# Patient Record
Sex: FEMALE | Race: WHITE | NOT HISPANIC OR LATINO
[De-identification: names, ages, dates, MRNs, and addresses within clinical notes are randomized per-mention and may not be internally consistent; named-entity substitution may affect disease eponyms.]

---

## 2018-07-24 ENCOUNTER — APPOINTMENT (OUTPATIENT)
Dept: UROLOGY | Facility: CLINIC | Age: 78
End: 2018-07-24
Payer: MEDICARE

## 2018-07-24 VITALS
SYSTOLIC BLOOD PRESSURE: 118 MMHG | RESPIRATION RATE: 14 BRPM | HEART RATE: 235 BPM | DIASTOLIC BLOOD PRESSURE: 62 MMHG | OXYGEN SATURATION: 95 %

## 2018-07-24 PROCEDURE — 99204 OFFICE O/P NEW MOD 45 MIN: CPT

## 2018-07-26 ENCOUNTER — APPOINTMENT (OUTPATIENT)
Dept: CT IMAGING | Facility: IMAGING CENTER | Age: 78
End: 2018-07-26
Payer: MEDICARE

## 2018-07-26 ENCOUNTER — OUTPATIENT (OUTPATIENT)
Dept: OUTPATIENT SERVICES | Facility: HOSPITAL | Age: 78
LOS: 1 days | End: 2018-07-26
Payer: MEDICARE

## 2018-07-26 DIAGNOSIS — N20.9 URINARY CALCULUS, UNSPECIFIED: ICD-10-CM

## 2018-07-26 PROCEDURE — 74176 CT ABD & PELVIS W/O CONTRAST: CPT | Mod: 26

## 2018-07-26 PROCEDURE — 74176 CT ABD & PELVIS W/O CONTRAST: CPT

## 2018-07-27 ENCOUNTER — TRANSCRIPTION ENCOUNTER (OUTPATIENT)
Age: 78
End: 2018-07-27

## 2018-07-28 ENCOUNTER — INPATIENT (INPATIENT)
Facility: HOSPITAL | Age: 78
LOS: 8 days | Discharge: HOME | DRG: 694 | End: 2018-08-06
Attending: FAMILY MEDICINE | Admitting: FAMILY MEDICINE
Payer: MEDICARE

## 2018-07-28 VITALS
RESPIRATION RATE: 18 BRPM | HEIGHT: 63 IN | TEMPERATURE: 98 F | HEART RATE: 81 BPM | WEIGHT: 229.94 LBS | OXYGEN SATURATION: 96 % | SYSTOLIC BLOOD PRESSURE: 146 MMHG | DIASTOLIC BLOOD PRESSURE: 69 MMHG

## 2018-07-28 DIAGNOSIS — N17.9 ACUTE KIDNEY FAILURE, UNSPECIFIED: ICD-10-CM

## 2018-07-28 DIAGNOSIS — J40 BRONCHITIS, NOT SPECIFIED AS ACUTE OR CHRONIC: ICD-10-CM

## 2018-07-28 DIAGNOSIS — C43.9 MALIGNANT MELANOMA OF SKIN, UNSPECIFIED: ICD-10-CM

## 2018-07-28 DIAGNOSIS — Z29.9 ENCOUNTER FOR PROPHYLACTIC MEASURES, UNSPECIFIED: ICD-10-CM

## 2018-07-28 DIAGNOSIS — R06.02 SHORTNESS OF BREATH: ICD-10-CM

## 2018-07-28 DIAGNOSIS — N20.0 CALCULUS OF KIDNEY: ICD-10-CM

## 2018-07-28 DIAGNOSIS — I10 ESSENTIAL (PRIMARY) HYPERTENSION: ICD-10-CM

## 2018-07-28 LAB
ALBUMIN SERPL ELPH-MCNC: 3.1 G/DL — LOW (ref 3.5–5)
ALP SERPL-CCNC: 67 U/L — SIGNIFICANT CHANGE UP (ref 40–120)
ALT FLD-CCNC: 18 U/L DA — SIGNIFICANT CHANGE UP (ref 10–60)
ANION GAP SERPL CALC-SCNC: 14 MMOL/L — SIGNIFICANT CHANGE UP (ref 5–17)
ANION GAP SERPL CALC-SCNC: 16 MMOL/L — SIGNIFICANT CHANGE UP (ref 5–17)
APPEARANCE UR: ABNORMAL
APTT BLD: 29.8 SEC — SIGNIFICANT CHANGE UP (ref 27.5–37.4)
AST SERPL-CCNC: 14 U/L — SIGNIFICANT CHANGE UP (ref 10–40)
BASOPHILS # BLD AUTO: 0 K/UL — SIGNIFICANT CHANGE UP (ref 0–0.2)
BASOPHILS # BLD AUTO: 0.1 K/UL — SIGNIFICANT CHANGE UP (ref 0–0.2)
BASOPHILS NFR BLD AUTO: 0.3 % — SIGNIFICANT CHANGE UP (ref 0–2)
BASOPHILS NFR BLD AUTO: 0.6 % — SIGNIFICANT CHANGE UP (ref 0–2)
BILIRUB SERPL-MCNC: 0.3 MG/DL — SIGNIFICANT CHANGE UP (ref 0.2–1.2)
BILIRUB UR-MCNC: NEGATIVE — SIGNIFICANT CHANGE UP
BLD GP AB SCN SERPL QL: SIGNIFICANT CHANGE UP
BUN SERPL-MCNC: 101 MG/DL — HIGH (ref 7–18)
BUN SERPL-MCNC: 94 MG/DL — HIGH (ref 7–18)
CALCIUM SERPL-MCNC: 8.7 MG/DL — SIGNIFICANT CHANGE UP (ref 8.4–10.5)
CALCIUM SERPL-MCNC: 8.7 MG/DL — SIGNIFICANT CHANGE UP (ref 8.4–10.5)
CHLORIDE SERPL-SCNC: 105 MMOL/L — SIGNIFICANT CHANGE UP (ref 96–108)
CHLORIDE SERPL-SCNC: 106 MMOL/L — SIGNIFICANT CHANGE UP (ref 96–108)
CO2 SERPL-SCNC: 17 MMOL/L — LOW (ref 22–31)
CO2 SERPL-SCNC: 19 MMOL/L — LOW (ref 22–31)
COLOR SPEC: YELLOW — SIGNIFICANT CHANGE UP
CREAT SERPL-MCNC: 12 MG/DL — HIGH (ref 0.5–1.3)
CREAT SERPL-MCNC: 14.4 MG/DL — HIGH (ref 0.5–1.3)
DIFF PNL FLD: ABNORMAL
EOSINOPHIL # BLD AUTO: 0 K/UL — SIGNIFICANT CHANGE UP (ref 0–0.5)
EOSINOPHIL # BLD AUTO: 0.5 K/UL — SIGNIFICANT CHANGE UP (ref 0–0.5)
EOSINOPHIL NFR BLD AUTO: 0.1 % — SIGNIFICANT CHANGE UP (ref 0–6)
EOSINOPHIL NFR BLD AUTO: 4.8 % — SIGNIFICANT CHANGE UP (ref 0–6)
GLUCOSE SERPL-MCNC: 112 MG/DL — HIGH (ref 70–99)
GLUCOSE SERPL-MCNC: 181 MG/DL — HIGH (ref 70–99)
GLUCOSE UR QL: NEGATIVE — SIGNIFICANT CHANGE UP
HCT VFR BLD CALC: 32.3 % — LOW (ref 34.5–45)
HCT VFR BLD CALC: 32.6 % — LOW (ref 34.5–45)
HGB BLD-MCNC: 10.7 G/DL — LOW (ref 11.5–15.5)
HGB BLD-MCNC: 11 G/DL — LOW (ref 11.5–15.5)
INR BLD: 1.03 RATIO — SIGNIFICANT CHANGE UP (ref 0.88–1.16)
KETONES UR-MCNC: NEGATIVE — SIGNIFICANT CHANGE UP
LEUKOCYTE ESTERASE UR-ACNC: ABNORMAL
LYMPHOCYTES # BLD AUTO: 0.4 K/UL — LOW (ref 1–3.3)
LYMPHOCYTES # BLD AUTO: 1.1 K/UL — SIGNIFICANT CHANGE UP (ref 1–3.3)
LYMPHOCYTES # BLD AUTO: 4.1 % — LOW (ref 13–44)
LYMPHOCYTES # BLD AUTO: 9.5 % — LOW (ref 13–44)
MCHC RBC-ENTMCNC: 28.6 PG — SIGNIFICANT CHANGE UP (ref 27–34)
MCHC RBC-ENTMCNC: 28.9 PG — SIGNIFICANT CHANGE UP (ref 27–34)
MCHC RBC-ENTMCNC: 33 GM/DL — SIGNIFICANT CHANGE UP (ref 32–36)
MCHC RBC-ENTMCNC: 33.8 GM/DL — SIGNIFICANT CHANGE UP (ref 32–36)
MCV RBC AUTO: 85.6 FL — SIGNIFICANT CHANGE UP (ref 80–100)
MCV RBC AUTO: 86.8 FL — SIGNIFICANT CHANGE UP (ref 80–100)
MONOCYTES # BLD AUTO: 0.2 K/UL — SIGNIFICANT CHANGE UP (ref 0–0.9)
MONOCYTES # BLD AUTO: 0.8 K/UL — SIGNIFICANT CHANGE UP (ref 0–0.9)
MONOCYTES NFR BLD AUTO: 1.7 % — LOW (ref 2–14)
MONOCYTES NFR BLD AUTO: 7.1 % — SIGNIFICANT CHANGE UP (ref 2–14)
NEUTROPHILS # BLD AUTO: 8.6 K/UL — HIGH (ref 1.8–7.4)
NEUTROPHILS # BLD AUTO: 9 K/UL — HIGH (ref 1.8–7.4)
NEUTROPHILS NFR BLD AUTO: 78 % — HIGH (ref 43–77)
NEUTROPHILS NFR BLD AUTO: 93.8 % — HIGH (ref 43–77)
NITRITE UR-MCNC: NEGATIVE — SIGNIFICANT CHANGE UP
PH UR: 5 — SIGNIFICANT CHANGE UP (ref 5–8)
PLATELET # BLD AUTO: 181 K/UL — SIGNIFICANT CHANGE UP (ref 150–400)
PLATELET # BLD AUTO: 183 K/UL — SIGNIFICANT CHANGE UP (ref 150–400)
POTASSIUM SERPL-MCNC: 4.7 MMOL/L — SIGNIFICANT CHANGE UP (ref 3.5–5.3)
POTASSIUM SERPL-MCNC: 4.8 MMOL/L — SIGNIFICANT CHANGE UP (ref 3.5–5.3)
POTASSIUM SERPL-SCNC: 4.7 MMOL/L — SIGNIFICANT CHANGE UP (ref 3.5–5.3)
POTASSIUM SERPL-SCNC: 4.8 MMOL/L — SIGNIFICANT CHANGE UP (ref 3.5–5.3)
PROT SERPL-MCNC: 7.3 G/DL — SIGNIFICANT CHANGE UP (ref 6–8.3)
PROT UR-MCNC: 30 MG/DL
PROTHROM AB SERPL-ACNC: 11.2 SEC — SIGNIFICANT CHANGE UP (ref 9.8–12.7)
RBC # BLD: 3.73 M/UL — LOW (ref 3.8–5.2)
RBC # BLD: 3.82 M/UL — SIGNIFICANT CHANGE UP (ref 3.8–5.2)
RBC # FLD: 11.7 % — SIGNIFICANT CHANGE UP (ref 10.3–14.5)
RBC # FLD: 11.8 % — SIGNIFICANT CHANGE UP (ref 10.3–14.5)
SODIUM SERPL-SCNC: 138 MMOL/L — SIGNIFICANT CHANGE UP (ref 135–145)
SODIUM SERPL-SCNC: 139 MMOL/L — SIGNIFICANT CHANGE UP (ref 135–145)
SP GR SPEC: 1.01 — SIGNIFICANT CHANGE UP (ref 1.01–1.02)
UROBILINOGEN FLD QL: NEGATIVE — SIGNIFICANT CHANGE UP
WBC # BLD: 11.1 K/UL — HIGH (ref 3.8–10.5)
WBC # BLD: 9.6 K/UL — SIGNIFICANT CHANGE UP (ref 3.8–10.5)
WBC # FLD AUTO: 11.1 K/UL — HIGH (ref 3.8–10.5)
WBC # FLD AUTO: 9.6 K/UL — SIGNIFICANT CHANGE UP (ref 3.8–10.5)

## 2018-07-28 PROCEDURE — 99285 EMERGENCY DEPT VISIT HI MDM: CPT

## 2018-07-28 PROCEDURE — 71046 X-RAY EXAM CHEST 2 VIEWS: CPT | Mod: 26

## 2018-07-28 PROCEDURE — 52332 CYSTOSCOPY AND TREATMENT: CPT | Mod: 50

## 2018-07-28 PROCEDURE — 93010 ELECTROCARDIOGRAM REPORT: CPT

## 2018-07-28 PROCEDURE — 99222 1ST HOSP IP/OBS MODERATE 55: CPT | Mod: 25

## 2018-07-28 PROCEDURE — 74420 UROGRAPHY RTRGR +-KUB: CPT | Mod: 26

## 2018-07-28 RX ORDER — SODIUM CHLORIDE 9 MG/ML
1000 INJECTION INTRAMUSCULAR; INTRAVENOUS; SUBCUTANEOUS
Qty: 0 | Refills: 0 | Status: DISCONTINUED | OUTPATIENT
Start: 2018-07-28 | End: 2018-08-06

## 2018-07-28 RX ORDER — HEPARIN SODIUM 5000 [USP'U]/ML
5000 INJECTION INTRAVENOUS; SUBCUTANEOUS EVERY 8 HOURS
Qty: 0 | Refills: 0 | Status: DISCONTINUED | OUTPATIENT
Start: 2018-07-28 | End: 2018-07-30

## 2018-07-28 RX ORDER — HYDROMORPHONE HYDROCHLORIDE 2 MG/ML
0.5 INJECTION INTRAMUSCULAR; INTRAVENOUS; SUBCUTANEOUS
Qty: 0 | Refills: 0 | Status: DISCONTINUED | OUTPATIENT
Start: 2018-07-28 | End: 2018-07-28

## 2018-07-28 RX ORDER — SODIUM CHLORIDE 9 MG/ML
1000 INJECTION, SOLUTION INTRAVENOUS
Qty: 0 | Refills: 0 | Status: DISCONTINUED | OUTPATIENT
Start: 2018-07-28 | End: 2018-07-28

## 2018-07-28 RX ORDER — ALBUTEROL 90 UG/1
1 AEROSOL, METERED ORAL EVERY 4 HOURS
Qty: 0 | Refills: 0 | Status: COMPLETED | OUTPATIENT
Start: 2018-07-28 | End: 2019-06-26

## 2018-07-28 RX ORDER — SODIUM CHLORIDE 9 MG/ML
1000 INJECTION INTRAMUSCULAR; INTRAVENOUS; SUBCUTANEOUS ONCE
Qty: 0 | Refills: 0 | Status: COMPLETED | OUTPATIENT
Start: 2018-07-28 | End: 2018-07-28

## 2018-07-28 RX ORDER — SODIUM CHLORIDE 9 MG/ML
3 INJECTION INTRAMUSCULAR; INTRAVENOUS; SUBCUTANEOUS ONCE
Qty: 0 | Refills: 0 | Status: COMPLETED | OUTPATIENT
Start: 2018-07-28 | End: 2018-07-28

## 2018-07-28 RX ORDER — IPRATROPIUM/ALBUTEROL SULFATE 18-103MCG
3 AEROSOL WITH ADAPTER (GRAM) INHALATION
Qty: 0 | Refills: 0 | Status: COMPLETED | OUTPATIENT
Start: 2018-07-28 | End: 2018-07-28

## 2018-07-28 RX ORDER — FENTANYL CITRATE 50 UG/ML
25 INJECTION INTRAVENOUS
Qty: 0 | Refills: 0 | Status: DISCONTINUED | OUTPATIENT
Start: 2018-07-28 | End: 2018-07-28

## 2018-07-28 RX ORDER — OXYCODONE AND ACETAMINOPHEN 5; 325 MG/1; MG/1
1 TABLET ORAL EVERY 6 HOURS
Qty: 0 | Refills: 0 | Status: DISCONTINUED | OUTPATIENT
Start: 2018-07-28 | End: 2018-07-28

## 2018-07-28 RX ORDER — TIOTROPIUM BROMIDE 18 UG/1
1 CAPSULE ORAL; RESPIRATORY (INHALATION) DAILY
Qty: 0 | Refills: 0 | Status: COMPLETED | OUTPATIENT
Start: 2018-07-28 | End: 2019-06-26

## 2018-07-28 RX ORDER — CIPROFLOXACIN LACTATE 400MG/40ML
200 VIAL (ML) INTRAVENOUS EVERY 12 HOURS
Qty: 0 | Refills: 0 | Status: DISCONTINUED | OUTPATIENT
Start: 2018-07-28 | End: 2018-08-03

## 2018-07-28 RX ORDER — IPRATROPIUM/ALBUTEROL SULFATE 18-103MCG
3 AEROSOL WITH ADAPTER (GRAM) INHALATION EVERY 6 HOURS
Qty: 0 | Refills: 0 | Status: DISCONTINUED | OUTPATIENT
Start: 2018-07-28 | End: 2018-08-05

## 2018-07-28 RX ADMIN — Medication 3 MILLILITER(S): at 10:45

## 2018-07-28 RX ADMIN — HEPARIN SODIUM 5000 UNIT(S): 5000 INJECTION INTRAVENOUS; SUBCUTANEOUS at 21:46

## 2018-07-28 RX ADMIN — Medication 3 MILLILITER(S): at 10:40

## 2018-07-28 RX ADMIN — Medication 3 MILLILITER(S): at 15:09

## 2018-07-28 RX ADMIN — Medication 100 MILLIGRAM(S): at 14:31

## 2018-07-28 RX ADMIN — Medication 3 MILLILITER(S): at 21:00

## 2018-07-28 RX ADMIN — Medication 3 MILLILITER(S): at 10:51

## 2018-07-28 RX ADMIN — SODIUM CHLORIDE 1000 MILLILITER(S): 9 INJECTION INTRAMUSCULAR; INTRAVENOUS; SUBCUTANEOUS at 10:51

## 2018-07-28 RX ADMIN — SODIUM CHLORIDE 3 MILLILITER(S): 9 INJECTION INTRAMUSCULAR; INTRAVENOUS; SUBCUTANEOUS at 10:45

## 2018-07-28 NOTE — H&P ADULT - NSHPLABSRESULTS_GEN_ALL_CORE
LABS:      CBC Full  -  ( 28 Jul 2018 10:54 )  WBC Count : 11.1 K/uL  Hemoglobin : 11.0 g/dL  Hematocrit : 32.6 %  Platelet Count - Automated : 183 K/uL  Mean Cell Volume : 85.6 fl  Mean Cell Hemoglobin : 28.9 pg  Mean Cell Hemoglobin Concentration : 33.8 gm/dL  Auto Neutrophil # : 8.6 K/uL  Auto Lymphocyte # : 1.1 K/uL  Auto Monocyte # : 0.8 K/uL  Auto Eosinophil # : 0.5 K/uL  Auto Basophil # : 0.1 K/uL  Auto Neutrophil % : 78.0 %  Auto Lymphocyte % : 9.5 %  Auto Monocyte % : 7.1 %  Auto Eosinophil % : 4.8 %  Auto Basophil % : 0.6 %    07-28    138  |  105  |  101<H>  ----------------------------<  112<H>  4.8   |  17<L>  |  14.40<H>    Ca    8.7      28 Jul 2018 10:54    TPro  7.3  /  Alb  3.1<L>  /  TBili  0.3  /  DBili  x   /  AST  14  /  ALT  18  /  AlkPhos  67  07-28    PT/INR - ( 28 Jul 2018 10:54 )   PT: 11.2 sec;   INR: 1.03 ratio         PTT - ( 28 Jul 2018 10:54 )  PTT:29.8 sec                  RADIOLOGY & ADDITIONAL STUDIES (The following images were personally reviewed):    EKG:    CXR: CBC Full  -  ( 28 Jul 2018 10:54 )  WBC Count : 11.1 K/uL  Hemoglobin : 11.0 g/dL  Hematocrit : 32.6 %  Platelet Count - Automated : 183 K/uL  Mean Cell Volume : 85.6 fl  Mean Cell Hemoglobin : 28.9 pg  Mean Cell Hemoglobin Concentration : 33.8 gm/dL  Auto Neutrophil # : 8.6 K/uL  Auto Lymphocyte # : 1.1 K/uL  Auto Monocyte # : 0.8 K/uL  Auto Eosinophil # : 0.5 K/uL  Auto Basophil # : 0.1 K/uL  Auto Neutrophil % : 78.0 %  Auto Lymphocyte % : 9.5 %  Auto Monocyte % : 7.1 %  Auto Eosinophil % : 4.8 %  Auto Basophil % : 0.6 %    07-28    138  |  105  |  101<H>  ----------------------------<  112<H>  4.8   |  17<L>  |  14.40<H>    Ca    8.7      28 Jul 2018 10:54    TPro  7.3  /  Alb  3.1<L>  /  TBili  0.3  /  DBili  x   /  AST  14  /  ALT  18  /  AlkPhos  67  07-28    PT/INR - ( 28 Jul 2018 10:54 )   PT: 11.2 sec;   INR: 1.03 ratio         PTT - ( 28 Jul 2018 10:54 )  PTT:29.8 sec      RADIOLOGY & ADDITIONAL STUDIES (The following images were personally reviewed):    EKG: NSR with normal QTc, no ST - T wave changes    CXR: mild pulmonary vascular congestion bilaterally      EXAM:  CT RENAL STONE [place]      PROCEDURE DATE:  07/26/2018      INTERPRETATION:  CLINICAL INFORMATION: Bilateral kidney stones.    PROCEDURE:   CT of the Abdomen and Pelvis was performed without intravenous contrast.   Intravenous contrast: None.  Oral contrast: None.  Sagittal and coronal reformats were performed.    FINDINGS:    LOWER CHEST: Within normal limits.    LIVER: Within normal limits.  BILE DUCTS: Normal caliber.  GALLBLADDER: Cholelithiasis.  SPLEEN: Within normal limits.  PANCREAS: Within normal limits.  ADRENALS: Within normal limits.  KIDNEYS/URETERS:     Right kidney/ureter: Mild to moderate right hydronephrosis secondary to   an 8 mm obstructing stone at the right UPJ. An additional 9 mm   non-obstructing right lower pole renal calculus.    Left kidney/ureter: Markedly atrophic left kidney with a 1.4 cm stone in   the left renal pelvis at the UPJ. Additional smaller left intrarenal   calculi. No left hydronephrosis.    BLADDER: A 3.1 x 2.1 cm bladder calculus.  REPRODUCTIVE ORGANS: A 5.5 cm pedunculated calcified melanoma at the left   lower uterine segment. No adnexal mass.    BOWEL: No bowel obstruction. Appendix is normal.  PERITONEUM: No ascites.  VESSELS:  Atherosclerotic changes.  RETROPERITONEUM: No lymphadenopathy.    ABDOMINAL WALL: Within normal limits.  BONES: Degenerative changes of the spine.    IMPRESSION:     Mild to moderate right hydronephrosis secondary to an 8 mm obstructing   stone at the right UPJ. Additional non-obstructing right lower pole renal   calculus.    Markedly atrophic left kidney as above with a 1.4 cm stone in the left   renal pelvis.    Large bladder calculus.

## 2018-07-28 NOTE — H&P ADULT - PROBLEM SELECTOR PLAN 1
requiring urological intervention  f/u BMP, creatinine  no need for antibiotics at this time as patient is afebrile, nontachycardic, vital signs within normal limits requiring urological intervention  f/u BMP, creatinine, urine lytes requiring urological intervention  f/u BMP, creatinine, urine lytes, urine output monitoring

## 2018-07-28 NOTE — ED PROVIDER NOTE - MEDICAL DECISION MAKING DETAILS
79 y/o F pt, concern for obstructing stones causing renal failure. Will do labs and contact Dr. Caldwell.

## 2018-07-28 NOTE — H&P ADULT - PROBLEM SELECTOR PLAN 4
s/p duoneb s/p 1 dose of duonebs  continue to monitor respiratory status s/p 1 dose of duonebs, likely due to fluid overload from obstruction  continue to monitor respiratory status

## 2018-07-28 NOTE — H&P ADULT - MUSCULOSKELETAL
detailed exam normal/ROM intact/no joint swelling/normal strength/no joint warmth/no joint erythema/no calf tenderness

## 2018-07-28 NOTE — H&P ADULT - PROBLEM SELECTOR PLAN 5
5.5 cm pedunculated calcified melanoma at the left lower uterine segment  further workup/investigation post-op

## 2018-07-28 NOTE — H&P ADULT - NSHPPHYSICALEXAM_GEN_ALL_CORE
Vital Signs Last 24 Hrs  T(C): 36.3 (28 Jul 2018 12:13), Max: 36.5 (28 Jul 2018 10:15)  T(F): 97.3 (28 Jul 2018 12:13), Max: 97.7 (28 Jul 2018 10:15)  HR: 83 (28 Jul 2018 12:13) (81 - 83)  BP: 150/73 (28 Jul 2018 12:13) (146/69 - 150/73)  RR: 18 (28 Jul 2018 12:13) (18 - 18)  SpO2: 95% (28 Jul 2018 12:13) (95% - 96%)

## 2018-07-28 NOTE — ED ADULT NURSE NOTE - NS ED NURSE DISCH DISPOSITION
GENERAL SURGERY POST OP NOTE    OPERATION: LAPAROSCOPIC CHOLECYSTECTOMY W/CHOLANGIOGRAMS  POST OP: 2 Day Post-Op     SUBJECTIVE:  Reporting pain in thighs, taking in liquids   OBJECTIVE:   Blood pressure 140/68, pulse 78, temperature 98 °F (36.7 °C), temperature source Oral, resp. rate 20, height 4' 8\" (1.422 m), weight 68.8 kg, SpO2 94 %.  RECENT WEIGHTS:  Weight    05/12/17 1902 05/13/17 0812 05/14/17 0804 05/15/17 0845   Weight: 65.7 kg 65 kg 67.7 kg 68.8 kg     General: Alert, No acute distress  Abdomen: soft, nondistended, surgical incision is clean, dry, and intact  Neuro: grossly normal  Intake/Output:    Intake/Output Summary (Last 24 hours) at 05/16/17 0754  Last data filed at 05/16/17 0634   Gross per 24 hour   Intake             2135 ml   Output              300 ml   Net             1835 ml      Last Stool Occurrence:       Laboratory Results:    Recent Labs  Lab 05/16/17  0355 05/15/17  0351 05/14/17  0615   WBC 7.9 7.4 8.2   RBC 4.27 3.93* 4.11   HCT 39.6 36.4 38.3   HGB 13.2 12.2 13.0    123* 118*       Recent Labs  Lab 05/15/17  0351 05/14/17  0354 05/13/17 2058 05/13/17  0345   SODIUM 142 144  --  146*   POTASSIUM 4.2 4.5  --  3.8   CHLORIDE 112* 113*  --  109*   CO2 21 22  --  22   GLUCOSE 225* 113*  --  228*   BUN 19 16  --  20   CREATININE 0.72 0.65  --  0.75   CALCIUM 7.6* 7.8*  --  8.1*   ALBUMIN 2.4* 2.2*  --  2.8*   MG 2.1  --  1.9  --    BILIRUBIN 2.8* 3.9*  --  3.8*   ALKPT 162* 137*  --  167*   * 191*  --  387*   * 184*  --  237*   BCRAT 26* 25  --  27*       Surgical Care Improvement Project:  Bhargavi: No  VTE Pharmacologic Prophylaxis: No pharmacologic Venous Thromboembolism prophylaxis due to Active Bleeding / Risk of Bleeding  VTE Mechanical Prophylaxis: Yes  Antibiotics D/C'd within 24 hours of surgery end: No,  Antibiotic ordered because patient has an infection or suspected infection  Central Line: No  Ulcer prophylaxis:Yes     DIAGNOSIS:  -- Gallstone  pancreatitis post Lap chris with (-) IOC 5/14 (Dr. Faulkner)  -- ERCP 5/13: Mild amount of sludge extracted from the bile duct with balloon sweeps. Likely had stones that may have passed.  Dilated common bile duct to about 8 mm. Opacification of the gallbladder showing cholelithiasis with a filling defect in the cystic duct. Post sphincterotomy bleed treated with injection of epinephrine and balloon tamponade  -- Advanced Dementia   -- DM    PLAN:  Pain management with scheduled Tylenol and Tramadol, 25 mg q 6hrs  Diet: Clear liquids advance to Transition  Begin stool softener   IV Zosyn   Labs: normal WBC. LFTs were improving, no draw today, CMP tomorrow am  Geriatrics to assist with medical management   Begin DVT prophylaxis   Discharge planning: SNF for rehab since she is well below her functional baseline    Ellyn Figueroa PA-C, [phone]       Admitted

## 2018-07-28 NOTE — CONSULT NOTE ADULT - SUBJECTIVE AND OBJECTIVE BOX
77 y/o woman with PMH of HTN, Kidney  stones had CT abd/pel done and was found to have b/l ureteral stones with obstructive uropathy;  pt was asked to come to ER for management.   Pt denies flank pain, N/V, fever/chills; Pt having decreased urine output with some dysuria.  ER work-up reveals acute renal failure with Cr 14.40    PAST MEDICAL & SURGICAL HISTORY:  Kidney stones  HTN (hypertension)  No significant past surgical history    PE: morbidly obese, some respiratory distress    Vital Signs Last 24 Hrs  T(C): 36.3 (28 Jul 2018 12:13), Max: 36.5 (28 Jul 2018 10:15)  T(F): 97.3 (28 Jul 2018 12:13), Max: 97.7 (28 Jul 2018 10:15)  HR: 83 (28 Jul 2018 12:13) (81 - 83)  BP: 150/73 (28 Jul 2018 12:13) (146/69 - 150/73)  BP(mean): --  RR: 18 (28 Jul 2018 12:13) (18 - 18)  SpO2: 95% (28 Jul 2018 12:13) (95% - 96%)    Abd: protuberant, soft, ND, no flank tenderness, suprapubic distended but non-tender                            11.0   11.1  )-----------( 183      ( 28 Jul 2018 10:54 )             32.6       07-28    138  |  105  |  101<H>  ----------------------------<  112<H>  4.8   |  17<L>  |  14.40<H>    Ca    8.7      28 Jul 2018 10:54    TPro  7.3  /  Alb  3.1<L>  /  TBili  0.3  /  DBili  x   /  AST  14  /  ALT  18  /  AlkPhos  67  07-28 77 y/o woman with PMH of HTN, Kidney  stones had CT abd/pel done and was found to have b/l ureteral stones with obstructive uropathy;  pt was asked to come to ER for management.   Pt denies flank pain, N/V, fever/chills; Pt having decreased urine output with some dysuria.  ER work-up reveals acute renal failure with Cr 14.40    PAST MEDICAL & SURGICAL HISTORY:  Kidney stones  HTN (hypertension)  No significant past surgical history    PE: morbidly obese, some respiratory distress    Vital Signs Last 24 Hrs  T(C): 36.3 (28 Jul 2018 12:13), Max: 36.5 (28 Jul 2018 10:15)  T(F): 97.3 (28 Jul 2018 12:13), Max: 97.7 (28 Jul 2018 10:15)  HR: 83 (28 Jul 2018 12:13) (81 - 83)  BP: 150/73 (28 Jul 2018 12:13) (146/69 - 150/73)  BP(mean): --  RR: 18 (28 Jul 2018 12:13) (18 - 18)  SpO2: 95% (28 Jul 2018 12:13) (95% - 96%)    Chest: B/l BS, some crackles at base  CVS: S1S2, RRR  Abd: protuberant, soft, ND, no flank tenderness, suprapubic distended but non-tender  Ext: +edema                            11.0   11.1  )-----------( 183      ( 28 Jul 2018 10:54 )             32.6       07-28    138  |  105  |  101<H>  ----------------------------<  112<H>  4.8   |  17<L>  |  14.40<H>    Ca    8.7      28 Jul 2018 10:54    TPro  7.3  /  Alb  3.1<L>  /  TBili  0.3  /  DBili  x   /  AST  14  /  ALT  18  /  AlkPhos  67  07-28

## 2018-07-28 NOTE — H&P ADULT - PROBLEM SELECTOR PLAN 3
patient on losartan and metoprolol at home, will hold for procedure patient on losartan and metoprolol at home, will hold for procedure  continue to monitor BP

## 2018-07-28 NOTE — CONSULT NOTE ADULT - ASSESSMENT
77 y/o woman with acute renal failure secondary to obstructive uropathy from b/l ureteral stones      -NPO, IVF  -IV abx  -Pre-op for urgent cystoscopy, b/l ureteral stent placement  -D/W Dr Caldwell 79 y/o woman with acute renal failure secondary to obstructive uropathy from b/l ureteral stones, fluid overload      -NPO, IVF  -IV abx  -Pre-op for urgent cystoscopy, b/l ureteral stent placement  -CXR  -D/W Dr Caldwell

## 2018-07-28 NOTE — H&P ADULT - HISTORY OF PRESENT ILLNESS
79 y/o F pt with a PMHx of HTN with no significant PSHx presents to the ED for further workup of acute renal failure.  Pt reports that she had a CT done last Wednesday for evaluation of kidney stones as outpatient with urologist Dr. Caldwell and received a call back yesterday informing her that she had obstructing stones, and advised to come to the ED for further management. During workup in the ED, patient found to have elevated creatinine of 14.4. Patient does state that she has had right-sided flank pain, cramping in nature, however has subsided for the last few days. Pt is making small amount of urine daily, has not voided today yet; patient states that her son had a similar issue in the past which required stents. Pt denies fever, chills, nausea, vomiting, or diarrhea. Patient states she generally has lower extremity swelling, however noticed that it has gotten worse lately. Patient also notes she was slightly short of breath when coming into the ED, however feels much better after nebulizer treatment.

## 2018-07-28 NOTE — H&P ADULT - PROBLEM SELECTOR PLAN 2
requiring urological intervention requiring urological intervention  Ciprofloxacin for urologic procedure prophylaxis  Patient is at moderate-high risk for procedure

## 2018-07-28 NOTE — CONSULT NOTE ADULT - SUBJECTIVE AND OBJECTIVE BOX
Chief complain  Renal consult was called for PAUL , Creatinine 14.  2 days ago patient was seen by urologyst and underwent CT abdomen that showed bilateral  ureter calculi.  Left atrophic kidney.  Right kidney good renal cortex.      Mild to moderate right hydronephrosis secondary to an 8 mm obstructing   stone at the rightUPJ. Additional nonobstructing right lower pole renal   calculus.    Markedly atrophic left kidney as above with a 1.4 cm stone in the left   renal pelvis.    Large bladder calculus.            PAST MEDICAL & SURGICAL HISTORY:  Kidney stones  HTN (hypertension)  No significant past surgical history      Home Medications Reviewed    Hospital Medications:   MEDICATIONS  (STANDING):  ALBUTerol    90 MICROgram(s) HFA Inhaler 1 Puff(s) Inhalation every 4 hours  ALBUTerol/ipratropium for Nebulization 3 milliLiter(s) Nebulizer every 6 hours  ciprofloxacin   IVPB 200 milliGRAM(s) IV Intermittent every 12 hours  heparin  Injectable 5000 Unit(s) SubCutaneous every 8 hours  tiotropium 18 MICROgram(s) Capsule 1 Capsule(s) Inhalation daily    MEDICATIONS  (PRN):  oxyCODONE    5 mG/acetaminophen 325 mG 1 Tablet(s) Oral every 6 hours PRN Severe Pain (7 - 10)      Allergies    penicillin (Rash)    Intolerances                              11.0   11.1  )-----------( 183      ( 28 Jul 2018 10:54 )             32.6     07-28    138  |  105  |  101<H>  ----------------------------<  112<H>  4.8   |  17<L>  |  14.40<H>    Ca    8.7      28 Jul 2018 10:54    TPro  7.3  /  Alb  3.1<L>  /  TBili  0.3  /  DBili  x   /  AST  14  /  ALT  18  /  AlkPhos  67  07-28    PT/INR - ( 28 Jul 2018 10:54 )   PT: 11.2 sec;   INR: 1.03 ratio         PTT - ( 28 Jul 2018 10:54 )  PTT:29.8 sec          RADIOLOGY & ADDITIONAL STUDIES:    SOCIAL HISTORY: Denies ETOh,Smoking,     FAMILY HISTORY:  Family history of kidney stones (Child)      REVIEW OF SYSTEMS:  CONSTITUTIONAL: No malaise, No fatigue, No fevers or chills, well developed, no diaphoresis  EYES/ENT: No visual changes;  No vertigo or throat pain   NECK: No pain or stiffness  RESPIRATORY: No cough, wheezing, hemoptysis; No shortness of breath  CARDIOVASCULAR: No chest pain or palpitations. No edema  GASTROINTESTINAL: No abdominal or epigastric pain. No nausea, vomiting, or hematemesis; No diarrhea or constipation. No melena or hematochezia.  GENITOURINARY: No dysuria, frequency, foamy urine, urinary urgency, incontinence or hematuria  NEUROLOGICAL: No numbness or weakness, No tremor  SKIN: No itching, burning, rashes, or lesions   VASCULAR: No claudication  Musculoskeletal: no arthralgia, no myalgia  All other review of systems is negative unless indicated above.    VITALS:  Vital Signs Last 24 Hrs  T(C): 36.5 (28 Jul 2018 16:40), Max: 36.6 (28 Jul 2018 14:21)  T(F): 97.7 (28 Jul 2018 16:40), Max: 97.8 (28 Jul 2018 14:21)  HR: 97 (28 Jul 2018 16:40) (81 - 103)  BP: 134/60 (28 Jul 2018 16:40) (116/61 - 150/73)  BP(mean): 78 (28 Jul 2018 15:39) (78 - 92)  RR: 18 (28 Jul 2018 16:40) (18 - 25)  SpO2: 97% (28 Jul 2018 16:40) (91% - 100%)    07-28 @ 07:01  -  07-28 @ 17:33  --------------------------------------------------------  IN: 425 mL / OUT: 550 mL / NET: -125 mL      Height (cm): 160.02 (07-28 @ 10:15)  Weight (kg): 104.3 (07-28 @ 10:15)  BMI (kg/m2): 40.7 (07-28 @ 10:15)  BSA (m2): 2.05 (07-28 @ 10:15)    PHYSICAL EXAM:  Constitutional: NAD  HEENT: anicteric sclera, oropharynx clear, MMM  Neck: No JVD  Respiratory: good air entrance B/L, no wheezes, rales or rhonchi  Cardiovascular: S1, S2, RRR, no pericardial rub, no murmur  Gastrointestinal: BS+, soft, no tenderness, no distension, no bruit  Pelvis: bladder non-distended, no CVA tenderness  Extremities: No cyanosis or clubbing. No peripheral edema  Neurological: A/O x 3, no focal deficits  Psychiatric: Normal mood, normal affect  : No CVA tenderness. No langford.   Skin: No rashes  Vascular: all pulses present  Access: Chief complain  Renal consult was called for PAUL , Creatinine 14.  Patient with a history of Nephrolithiasis, c/o of one week of right flak pain, reduced urine output in the last 2 days.  She was seen by her Urologist and was told to come to the  ER due to bilateral obstructing calculi in ureters.  Patient c/o nausea and reduced appetite in the last 2 days.  C/O leg swelling and SOB for 2 days.  No dysuria, no fever or chills.  She under went Cystoscopy at 1 pm with bilateral stent placement.  After procedure urine output increased.    CT pelvic with contrast was done on 07/26:  Left atrophic kidney.  Right kidney good renal cortex.      Mild to moderate right hydronephrosis secondary to an 8 mm obstructing   stone at the rightUPJ. Additional nonobstructing right lower pole renal   calculus.    Markedly atrophic left kidney as above with a 1.4 cm stone in the left   renal pelvis.    Large bladder calculus.    Patient is not aware of CKD .  History of hypertension            PAST MEDICAL & SURGICAL HISTORY:  Kidney stones  HTN (hypertension)  No significant past surgical history      Home Medications Reviewed    Hospital Medications:   MEDICATIONS  (STANDING):  ALBUTerol    90 MICROgram(s) HFA Inhaler 1 Puff(s) Inhalation every 4 hours  ALBUTerol/ipratropium for Nebulization 3 milliLiter(s) Nebulizer every 6 hours  ciprofloxacin   IVPB 200 milliGRAM(s) IV Intermittent every 12 hours  heparin  Injectable 5000 Unit(s) SubCutaneous every 8 hours  tiotropium 18 MICROgram(s) Capsule 1 Capsule(s) Inhalation daily    MEDICATIONS  (PRN):  oxyCODONE    5 mG/acetaminophen 325 mG 1 Tablet(s) Oral every 6 hours PRN Severe Pain (7 - 10)      Allergies    penicillin (Rash)    Intolerances                              11.0   11.1  )-----------( 183      ( 28 Jul 2018 10:54 )             32.6     07-28    138  |  105  |  101<H>  ----------------------------<  112<H>  4.8   |  17<L>  |  14.40<H>    Ca    8.7      28 Jul 2018 10:54    TPro  7.3  /  Alb  3.1<L>  /  TBili  0.3  /  DBili  x   /  AST  14  /  ALT  18  /  AlkPhos  67  07-28    PT/INR - ( 28 Jul 2018 10:54 )   PT: 11.2 sec;   INR: 1.03 ratio         PTT - ( 28 Jul 2018 10:54 )  PTT:29.8 sec          RADIOLOGY & ADDITIONAL STUDIES:  FINDINGS:  No previous examinations are available for review.    The lungs are free of infiltrate.  No pleural abnormality is seen. The   heart appears normal in size. There is a prominent aortic no abdominal   and with appears to be tortuosity of the descending thoracic aorta. A   superimposed aneurysm or mass in the distal mediastinum is not excluded.   No destructive lesion is identified in the visualized skeleton.      IMPRESSION: No infiltrate. Prominent aortic arch and tortuous descending   thoracic aorta. Aortic aneurysm or distal mediastinal mass is not   excluded. CT scan of the chest is recommended.          SOCIAL HISTORY: Denies ETOh,Smoking,     FAMILY HISTORY:  Family history of kidney stones (Child)son.      REVIEW OF SYSTEMS:  CONSTITUTIONAL: No malaise, No fatigue, No fevers or chills, well developed, no diaphoresis  EYES/ENT: No visual changes;  No vertigo or throat pain   NECK: No pain or stiffness  RESPIRATORY: as above  CARDIOVASCULAR: No chest pain or palpitations.  edema for 2 days  Nausea for 2 days  GENITOURINARY: reduced urine output for 2 days  NEUROLOGICAL: No numbness or weakness, No tremor  SKIN: No itching, burning, rashes, or lesions   VASCULAR: No claudication  Musculoskeletal: no arthralgia, no myalgia      VITALS:  Vital Signs Last 24 Hrs  T(C): 36.5 (28 Jul 2018 16:40), Max: 36.6 (28 Jul 2018 14:21)  T(F): 97.7 (28 Jul 2018 16:40), Max: 97.8 (28 Jul 2018 14:21)  HR: 97 (28 Jul 2018 16:40) (81 - 103)  BP: 134/60 (28 Jul 2018 16:40) (116/61 - 150/73)  BP(mean): 78 (28 Jul 2018 15:39) (78 - 92)  RR: 18 (28 Jul 2018 16:40) (18 - 25)  SpO2: 97% (28 Jul 2018 16:40) (91% - 100%)    07-28 @ 07:01  -  07-28 @ 17:33  --------------------------------------------------------  IN: 425 mL / OUT: 550 mL / NET: -125 mL      Height (cm): 160.02 (07-28 @ 10:15)  Weight (kg): 104.3 (07-28 @ 10:15)  BMI (kg/m2): 40.7 (07-28 @ 10:15)  BSA (m2): 2.05 (07-28 @ 10:15)    PHYSICAL EXAM:  Constitutional: NAD  HEENT: anicteric sclera, oropharynx clear, MMM  Neck: No JVD  Respiratory: good air entrance B/L, no wheezes, rales or rhonchi  Cardiovascular: S1, S2, RRR, no pericardial rub, no murmur  Gastrointestinal: BS+, soft, no tenderness, no distension, no bruit  Pelvis: bladder non-distended, no CVA tenderness, urine in bag not cloudt dark orange color, 500-600 in bag.  Extremities: edema in tibial raes trace right more than left.

## 2018-07-28 NOTE — ED PROVIDER NOTE - OBJECTIVE STATEMENT
79 y/o F pt with a PMHx of HTN and kidney stones and no significant PSHx presents to the ED for evaluation of possible kidney failure. Pt reports that she had a CT done last Wednesday for evaluation of kidney stones and received a call back yesterday informing her that she had obstructing stones and may have kidney failure. Pt is making urine; states that she had a similar issue in the past which required stents. Pt denies fever, chills or any other complaints. Allergy; PCN 77 y/o F pt with a PMHx of HTN on Losartan and Metoprolol and kidney stones and no significant PSHx presents to the ED for evaluation of possible kidney failure. Pt reports that she had a CT done last Wednesday for evaluation of kidney stones and received a call back yesterday informing her that she had obstructing stones and may have kidney failure. Pt is making small amount of urine daily, has not voided today yet; states that her son had a similar issue in the past which required stents. Pt denies fever, chills or any other complaints. Allergy; PCN

## 2018-07-28 NOTE — H&P ADULT - FAMILY HISTORY
Child  Still living? Yes, Estimated age: 41-50  Family history of kidney stones, Age at diagnosis: Age Unknown

## 2018-07-28 NOTE — CONSULT NOTE ADULT - ATTENDING COMMENTS
renal failure melissa obstructing stones  to or for melissa ureteral stents  risk of bleeding infection damage to ureter failure of procedure and need for pcn reviewed

## 2018-07-28 NOTE — CONSULT NOTE ADULT - CONSULT REASON
PAUL  Nephrolithiasis and ureter calculi bialteral PAUL  Nephrolithiasis and ureter calculi bilateral

## 2018-07-28 NOTE — H&P ADULT - ASSESSMENT
Patient admitted for concerns of acute renal failure with post-obstructive component, requiring urological stent placement and further monitoring of urine output and renal functions. Patient admitted for concerns of acute renal failure with post-obstructive component, requiring urological stent placement and further monitoring of urine output and renal functions.    CT finding remarkable for:   1) Mild to moderate right hydronephrosis 2/2  8 mm obstructing stone at the right UPJ. An additional 9 mm non-obstructing right lower pole renal calculus  2) Markedly atrophic left kidney with a 1.4 cm stone in the left renal pelvis at the UPJ. Additional smaller left intrarenal calculi. 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

## 2018-07-28 NOTE — H&P ADULT - ATTENDING COMMENTS
Patient seen and examined. Case fully discussed with  ER attending and medical resident. Reviewed chief complaint. Reviewed review of systems. Reviewed list of medications.  Reviewed physical exam.  Reviewed assessment and plan. agree with full H and P. Will follow up clinically. Patient seen earlier this am and evaluated. Case fully discussed with the medical team, Surgical team and nephrology. Patient needs emergency urological surgery of obstructing stone. Patient at moderate to high risk for surgery, However needs to have above surgery. Family fully aware. Will follow up clinically.

## 2018-07-28 NOTE — H&P ADULT - NSHPSOCIALHISTORY_GEN_ALL_CORE
patient recently , lives in building alone, however son lives downstairs  previous social smoker, quit 20 years ago  occasional alcohol use  denies illicit drug use

## 2018-07-29 LAB
ANION GAP SERPL CALC-SCNC: 10 MMOL/L — SIGNIFICANT CHANGE UP (ref 5–17)
BUN SERPL-MCNC: 81 MG/DL — HIGH (ref 7–18)
CALCIUM SERPL-MCNC: 9 MG/DL — SIGNIFICANT CHANGE UP (ref 8.4–10.5)
CHLORIDE SERPL-SCNC: 109 MMOL/L — HIGH (ref 96–108)
CHOLEST SERPL-MCNC: 136 MG/DL — SIGNIFICANT CHANGE UP (ref 10–199)
CO2 SERPL-SCNC: 21 MMOL/L — LOW (ref 22–31)
CREAT SERPL-MCNC: 8.4 MG/DL — HIGH (ref 0.5–1.3)
CULTURE RESULTS: NO GROWTH — SIGNIFICANT CHANGE UP
GLUCOSE SERPL-MCNC: 156 MG/DL — HIGH (ref 70–99)
HBA1C BLD-MCNC: 6.1 % — HIGH (ref 4–5.6)
HCT VFR BLD CALC: 32.7 % — LOW (ref 34.5–45)
HDLC SERPL-MCNC: 28 MG/DL — LOW (ref 40–125)
HGB BLD-MCNC: 10.7 G/DL — LOW (ref 11.5–15.5)
LIPID PNL WITH DIRECT LDL SERPL: 90 MG/DL — SIGNIFICANT CHANGE UP
MAGNESIUM SERPL-MCNC: 2.3 MG/DL — SIGNIFICANT CHANGE UP (ref 1.6–2.6)
MCHC RBC-ENTMCNC: 28.6 PG — SIGNIFICANT CHANGE UP (ref 27–34)
MCHC RBC-ENTMCNC: 32.6 GM/DL — SIGNIFICANT CHANGE UP (ref 32–36)
MCV RBC AUTO: 87.5 FL — SIGNIFICANT CHANGE UP (ref 80–100)
PHOSPHATE SERPL-MCNC: 6.2 MG/DL — HIGH (ref 2.5–4.5)
PLATELET # BLD AUTO: 178 K/UL — SIGNIFICANT CHANGE UP (ref 150–400)
POTASSIUM SERPL-MCNC: 5.2 MMOL/L — SIGNIFICANT CHANGE UP (ref 3.5–5.3)
POTASSIUM SERPL-SCNC: 5.2 MMOL/L — SIGNIFICANT CHANGE UP (ref 3.5–5.3)
RBC # BLD: 3.74 M/UL — LOW (ref 3.8–5.2)
RBC # FLD: 12 % — SIGNIFICANT CHANGE UP (ref 10.3–14.5)
SODIUM SERPL-SCNC: 140 MMOL/L — SIGNIFICANT CHANGE UP (ref 135–145)
SPECIMEN SOURCE: SIGNIFICANT CHANGE UP
TOTAL CHOLESTEROL/HDL RATIO MEASUREMENT: 4.9 RATIO — SIGNIFICANT CHANGE UP (ref 3.3–7.1)
TRIGL SERPL-MCNC: 91 MG/DL — SIGNIFICANT CHANGE UP (ref 10–149)
TSH SERPL-MCNC: 0.14 UU/ML — LOW (ref 0.34–4.82)
WBC # BLD: 11.1 K/UL — HIGH (ref 3.8–10.5)
WBC # FLD AUTO: 11.1 K/UL — HIGH (ref 3.8–10.5)

## 2018-07-29 PROCEDURE — 99232 SBSQ HOSP IP/OBS MODERATE 35: CPT

## 2018-07-29 RX ADMIN — Medication 3 MILLILITER(S): at 20:18

## 2018-07-29 RX ADMIN — HEPARIN SODIUM 5000 UNIT(S): 5000 INJECTION INTRAVENOUS; SUBCUTANEOUS at 13:19

## 2018-07-29 RX ADMIN — HEPARIN SODIUM 5000 UNIT(S): 5000 INJECTION INTRAVENOUS; SUBCUTANEOUS at 05:40

## 2018-07-29 RX ADMIN — HEPARIN SODIUM 5000 UNIT(S): 5000 INJECTION INTRAVENOUS; SUBCUTANEOUS at 21:38

## 2018-07-29 RX ADMIN — Medication 3 MILLILITER(S): at 15:51

## 2018-07-29 RX ADMIN — Medication 100 MILLIGRAM(S): at 05:40

## 2018-07-29 RX ADMIN — Medication 3 MILLILITER(S): at 09:49

## 2018-07-29 RX ADMIN — Medication 100 MILLIGRAM(S): at 18:25

## 2018-07-29 NOTE — PROGRESS NOTE ADULT - SUBJECTIVE AND OBJECTIVE BOX
Patient seen/examined.  Feels well. No flank pain. No N/V.  Creatinine down to 8.4 today.    Vital Signs Last 24 Hrs  T(C): 36.7 (29 Jul 2018 05:42), Max: 36.7 (29 Jul 2018 00:42)  T(F): 98 (29 Jul 2018 05:42), Max: 98.1 (29 Jul 2018 00:42)  HR: 69 (29 Jul 2018 05:42) (69 - 103)  BP: 125/66 (29 Jul 2018 05:42) (116/61 - 150/73)  BP(mean): 78 (28 Jul 2018 15:39) (78 - 92)  RR: 16 (29 Jul 2018 05:42) (16 - 25)  SpO2: 97% (29 Jul 2018 05:42) (91% - 100%)    General: NAD. AAOx3  Abd: soft. NT/ND  : Weaver with clear yellow urine                          10.7   11.1  )-----------( 178      ( 29 Jul 2018 06:43 )             32.7     07-29    140  |  109<H>  |  81<H>  ----------------------------<  156<H>  5.2   |  21<L>  |  8.40<H>    Ca    9.0      29 Jul 2018 06:43  Phos  6.2     07-29  Mg     2.3     07-29    TPro  7.3  /  Alb  3.1<L>  /  TBili  0.3  /  DBili  x   /  AST  14  /  ALT  18  /  AlkPhos  67  07-28

## 2018-07-29 NOTE — PROGRESS NOTE ADULT - ASSESSMENT
Very pleasant 78 year old woman with bilateral obstructing stones, s/p stents yesterday  -Trend creatinine  -Monitor UOP  -Keep Weaver catheter for now  -Patient will follow up with Dr. Caldwell after discharge

## 2018-07-29 NOTE — PROGRESS NOTE ADULT - SUBJECTIVE AND OBJECTIVE BOX
Problem List:  PAUL - post bilateral ureter stents placement.    Nephrolithiasis  with bilateral ureter stones and obstruction.  Small atrophic left kidney.    Non oliguric urine output 2000 in the last 16 hours.      PAST MEDICAL & SURGICAL HISTORY:  Kidney stones  HTN (hypertension)      penicillin (Rash)      MEDICATIONS  (STANDING):  ALBUTerol    90 MICROgram(s) HFA Inhaler 1 Puff(s) Inhalation every 4 hours  ALBUTerol/ipratropium for Nebulization 3 milliLiter(s) Nebulizer every 6 hours  ciprofloxacin   IVPB 200 milliGRAM(s) IV Intermittent every 12 hours  heparin  Injectable 5000 Unit(s) SubCutaneous every 8 hours  sodium chloride 0.9%. 1000 milliLiter(s) (75 mL/Hr) IV Continuous <Continuous>  tiotropium 18 MICROgram(s) Capsule 1 Capsule(s) Inhalation daily    MEDICATIONS  (PRN):  oxyCODONE    5 mG/acetaminophen 325 mG 1 Tablet(s) Oral every 6 hours PRN Severe Pain (7 - 10)                            10.7   11.1  )-----------( 178      ( 29 Jul 2018 06:43 )             32.7     07-29    140  |  109<H>  |  81<H>  ----------------------------<  156<H>  5.2   |  21<L>  |  8.40<H>    Ca    9.0      29 Jul 2018 06:43  Phos  6.2     07-29  Mg     2.3     07-29    TPro  7.3  /  Alb  3.1<L>  /  TBili  0.3  /  DBili  x   /  AST  14  /  ALT  18  /  AlkPhos  67  07-28    PT/INR - ( 28 Jul 2018 10:54 )   PT: 11.2 sec;   INR: 1.03 ratio         PTT - ( 28 Jul 2018 10:54 )  PTT:29.8 sec        REVIEW OF SYSTEMS:  General: no fever no chills, no weight loss.  RESPIRATORY: No cough, wheezing, hemoptysis; No shortness of breath  CARDIOVASCULAR: No chest pain or palpitations. No Edema  GASTROINTESTINAL: No abdominal or epigastric pain. No nausea, vomiting. No diarrhea or constipation. No melena.  GENITOURINARY: No dysuria, frequency, foamy urine, urinary urgency, incontinence or hematuria  NEUROLOGICAL: No numbness or weakness, no tremor , no dizziness.   Muscle skeletal : no joint pain and no swelling of joints and limbs.  SKIN: No itching, burning, rashes.        VITALS:  T(F): 98 (07-29-18 @ 05:42), Max: 98.1 (07-29-18 @ 00:42)  HR: 69 (07-29-18 @ 05:42)  BP: 125/66 (07-29-18 @ 05:42)  RR: 16 (07-29-18 @ 05:42)  SpO2: 97% (07-29-18 @ 05:42)  Wt(kg): --    07-28 @ 07:01  -  07-29 @ 07:00  --------------------------------------------------------  IN: 425 mL / OUT: 3000 mL / NET: -2575 mL      Height (cm): 160.02 (07-28 @ 10:15)  Weight (kg): 104.3 (07-28 @ 10:15)  BMI (kg/m2): 40.7 (07-28 @ 10:15)  BSA (m2): 2.05 (07-28 @ 10:15)  PHYSICAL EXAM:  Constitutional: well developed, no diaphoresis, no distress.  Neck: No JVD, no carotid bruit, supple, no adenopathy  Respiratory: Good air entrance B/L, no wheezes, rales or rhonchi  Cardiovascular: S1, S2, RRR, no pericardial rub, no murmur  Abdomen: BS+, soft, no tenderness, no bruit  Pelvis: langford bag.  Extremities: trace edema bilateral

## 2018-07-29 NOTE — PROGRESS NOTE ADULT - ASSESSMENT
Non oliguric PAUL, obstructive uroapthy-   Nephrolithiasis.  Atrophy of left kidney.      Follow intake and output.  Follow lab in am, with PO4 and magnesium.  Hyperphosphatemia await repeat PO4 in AM . Hold use of binders for now.  Mild hyperkalemia, follow 2 gn K diet.      Stone workup as per Urology.  Once renal function stable, follow uric acid and urine work up for stone.

## 2018-07-29 NOTE — PROGRESS NOTE ADULT - SUBJECTIVE AND OBJECTIVE BOX
CHIEF COMPLAINT:Patient is a 78y old  Female who presents with a chief complaint of obstructing renal stone (2018 12:28)    	  REVIEW OF SYSTEMS:  CONSTITUTIONAL: No fever, weight loss, or fatigue  EYES: No eye pain, visual disturbances, or discharge  ENMT:  No difficulty hearing, tinnitus, vertigo; No sinus or throat pain  NECK: No pain or stiffness  RESPIRATORY: No cough, wheezing, chills or hemoptysis; No Shortness of Breath  CARDIOVASCULAR: No chest pain, palpitations, passing out, dizziness, or leg swelling  GASTROINTESTINAL: No abdominal or epigastric pain. No nausea, vomiting, or hematemesis; No diarrhea or constipation. No melena or hematochezia.  GENITOURINARY: No dysuria, frequency, hematuria, or incontinence  NEUROLOGICAL: No headaches, memory loss, loss of strength, numbness, or tremors  SKIN: No itching, burning, rashes, or lesions   LYMPH Nodes: No enlarged glands  ENDOCRINE: No heat or cold intolerance; No hair loss  MUSCULOSKELETAL: No joint pain or swelling; No muscle, back, or extremity pain  PSYCHIATRIC: No depression, anxiety, mood swings, or difficulty sleeping  HEME/LYMPH: No easy bruising, or bleeding gums  ALLERY AND IMMUNOLOGIC: No hives or eczema	    [ ] All others negative	  [ ] Unable to obtain    PHYSICAL EXAM:  T(C): 36.5 (18 @ 21:08), Max: 36.7 (18 @ 00:42)  HR: 90 (18 @ 21:08) (69 - 90)  BP: 140/63 (18 @ 21:08) (125/66 - 149/69)  RR: 17 (18 @ 21:08) (16 - 17)  SpO2: 99% (18 @ 21:08) (97% - 99%)  Wt(kg): --  I&O's Summary    2018 07:  -  2018 07:00  --------------------------------------------------------  IN: 425 mL / OUT: 3000 mL / NET: -2575 mL    2018 07:01  -  2018 00:04  --------------------------------------------------------  IN: 0 mL / OUT: 1200 mL / NET: -1200 mL        Appearance: With no complaints. feels well. Was talking on the phone with friend.	  HEENT:   Normal oral mucosa, PERRL, EOMI	  Lymphatic: No lymphadenopathy  Cardiovascular: Normal S1 S2, No JVD, No murmurs, No edema  Respiratory: Lungs clear to auscultation	  Psychiatry: A & O x 3, Mood & affect appropriate  Gastrointestinal:  Soft, Non-tender, + BS	  Skin: No rashes, No ecchymoses, No cyanosis	  Neurologic: Non-focal  Extremities: Normal range of motion, No clubbing, cyanosis or edema  Vascular: Peripheral pulses palpable 2+ bilaterally    MEDICATIONS  (STANDING):  ALBUTerol    90 MICROgram(s) HFA Inhaler 1 Puff(s) Inhalation every 4 hours  ALBUTerol/ipratropium for Nebulization 3 milliLiter(s) Nebulizer every 6 hours  ciprofloxacin   IVPB 200 milliGRAM(s) IV Intermittent every 12 hours  heparin  Injectable 5000 Unit(s) SubCutaneous every 8 hours  sodium chloride 0.9%. 1000 milliLiter(s) (75 mL/Hr) IV Continuous <Continuous>  tiotropium 18 MICROgram(s) Capsule 1 Capsule(s) Inhalation daily      TELEMETRY: 	    ECG:  	  RADIOLOGY:  OTHER: 	  	  CBC Full  -  ( 2018 06:43 )  WBC Count : 11.1 K/uL  Hemoglobin : 10.7 g/dL  Hematocrit : 32.7 %  Platelet Count - Automated : 178 K/uL  Mean Cell Volume : 87.5 fl  Mean Cell Hemoglobin : 28.6 pg  Mean Cell Hemoglobin Concentration : 32.6 gm/dL  Auto Neutrophil # : x  Auto Lymphocyte # : x  Auto Monocyte # : x  Auto Eosinophil # : x  Auto Basophil # : x  Auto Neutrophil % : x  Auto Lymphocyte % : x  Auto Monocyte % : x  Auto Eosinophil % : x  Auto Basophil % : x        CARDIAC MARKERS:                              10.7   11.1  )-----------( 178      ( 2018 06:43 )             32.7       07-29    140  |  109<H>  |  81<H>  ----------------------------<  156<H>  5.2   |  21<L>  |  8.40<H>    Ca    9.0      2018 06:43  Phos  6.2       Mg     2.3         TPro  7.3  /  Alb  3.1<L>  /  TBili  0.3  /  DBili  x   /  AST  14  /  ALT  18  /  AlkPhos  67        PT/INR - ( 2018 10:54 )   PT: 11.2 sec;   INR: 1.03 ratio         PTT - ( 2018 10:54 )  PTT:29.8 sec    Urinalysis Basic - ( 2018 21:19 )    Color: Yellow / Appearance: Slightly Turbid / S.015 / pH: x  Gluc: x / Ketone: Negative  / Bili: Negative / Urobili: Negative   Blood: x / Protein: 30 mg/dL / Nitrite: Negative   Leuk Esterase: Small / RBC: >50 /HPF / WBC 3-5 /HPF   Sq Epi: x / Non Sq Epi: Few /HPF / Bacteria: Trace /HPF      proBNP:   Lipid Profile: Cholesterol 136  LDL 90  HDL 28  TG 91    HgA1c: Hemoglobin A1C, Whole Blood: 6.1 % ( @ 11:47)    TSH: Thyroid Stimulating Hormone, Serum: 0.14 uU/mL ( @ 06:43)

## 2018-07-29 NOTE — PROGRESS NOTE ADULT - PROBLEM SELECTOR PLAN 2
requiring urological intervention. Done yesterday. doing well. with no complaints.  Ciprofloxacin for urologic procedure prophylaxis

## 2018-07-29 NOTE — PROGRESS NOTE ADULT - ASSESSMENT
Patient admitted for concerns of acute renal failure with post-obstructive component, requiring urological stent placement and further monitoring of urine output and renal functions.    CT finding remarkable for:   1) Mild to moderate right hydronephrosis 2/2  8 mm obstructing stone at the right UPJ. An additional 9 mm non-obstructing right lower pole renal calculus  2) Markedly atrophic left kidney with a 1.4 cm stone in the left renal pelvis at the UPJ. Additional smaller left intrarenal calculi. 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

## 2018-07-29 NOTE — PROGRESS NOTE ADULT - PROBLEM SELECTOR PLAN 4
s/p 1 dose of duonebs, likely due to fluid overload from obstruction  continue to monitor respiratory status

## 2018-07-30 LAB
ANION GAP SERPL CALC-SCNC: 8 MMOL/L — SIGNIFICANT CHANGE UP (ref 5–17)
BUN SERPL-MCNC: 59 MG/DL — HIGH (ref 7–18)
CALCIUM SERPL-MCNC: 9.2 MG/DL — SIGNIFICANT CHANGE UP (ref 8.4–10.5)
CHLORIDE SERPL-SCNC: 109 MMOL/L — HIGH (ref 96–108)
CO2 SERPL-SCNC: 24 MMOL/L — SIGNIFICANT CHANGE UP (ref 22–31)
CREAT SERPL-MCNC: 3.97 MG/DL — HIGH (ref 0.5–1.3)
CULTURE RESULTS: SIGNIFICANT CHANGE UP
GLUCOSE BLDC GLUCOMTR-MCNC: 110 MG/DL — HIGH (ref 70–99)
GLUCOSE BLDC GLUCOMTR-MCNC: 143 MG/DL — HIGH (ref 70–99)
GLUCOSE BLDC GLUCOMTR-MCNC: 177 MG/DL — HIGH (ref 70–99)
GLUCOSE SERPL-MCNC: 102 MG/DL — HIGH (ref 70–99)
HCT VFR BLD CALC: 32.7 % — LOW (ref 34.5–45)
HCT VFR BLD CALC: 33.7 % — LOW (ref 34.5–45)
HGB BLD-MCNC: 10.7 G/DL — LOW (ref 11.5–15.5)
HGB BLD-MCNC: 11 G/DL — LOW (ref 11.5–15.5)
MAGNESIUM SERPL-MCNC: 1.9 MG/DL — SIGNIFICANT CHANGE UP (ref 1.6–2.6)
MCHC RBC-ENTMCNC: 28.6 PG — SIGNIFICANT CHANGE UP (ref 27–34)
MCHC RBC-ENTMCNC: 29 PG — SIGNIFICANT CHANGE UP (ref 27–34)
MCHC RBC-ENTMCNC: 32.6 GM/DL — SIGNIFICANT CHANGE UP (ref 32–36)
MCHC RBC-ENTMCNC: 32.8 GM/DL — SIGNIFICANT CHANGE UP (ref 32–36)
MCV RBC AUTO: 87.9 FL — SIGNIFICANT CHANGE UP (ref 80–100)
MCV RBC AUTO: 88.3 FL — SIGNIFICANT CHANGE UP (ref 80–100)
PHOSPHATE SERPL-MCNC: 3.8 MG/DL — SIGNIFICANT CHANGE UP (ref 2.5–4.5)
PLATELET # BLD AUTO: 199 K/UL — SIGNIFICANT CHANGE UP (ref 150–400)
PLATELET # BLD AUTO: 215 K/UL — SIGNIFICANT CHANGE UP (ref 150–400)
POTASSIUM SERPL-MCNC: 4.6 MMOL/L — SIGNIFICANT CHANGE UP (ref 3.5–5.3)
POTASSIUM SERPL-SCNC: 4.6 MMOL/L — SIGNIFICANT CHANGE UP (ref 3.5–5.3)
RAPID RVP RESULT: SIGNIFICANT CHANGE UP
RBC # BLD: 3.72 M/UL — LOW (ref 3.8–5.2)
RBC # BLD: 3.82 M/UL — SIGNIFICANT CHANGE UP (ref 3.8–5.2)
RBC # FLD: 12.2 % — SIGNIFICANT CHANGE UP (ref 10.3–14.5)
RBC # FLD: 12.5 % — SIGNIFICANT CHANGE UP (ref 10.3–14.5)
SODIUM SERPL-SCNC: 141 MMOL/L — SIGNIFICANT CHANGE UP (ref 135–145)
SPECIMEN SOURCE: SIGNIFICANT CHANGE UP
WBC # BLD: 10.6 K/UL — HIGH (ref 3.8–10.5)
WBC # BLD: 9.1 K/UL — SIGNIFICANT CHANGE UP (ref 3.8–10.5)
WBC # FLD AUTO: 10.6 K/UL — HIGH (ref 3.8–10.5)
WBC # FLD AUTO: 9.1 K/UL — SIGNIFICANT CHANGE UP (ref 3.8–10.5)

## 2018-07-30 PROCEDURE — 99232 SBSQ HOSP IP/OBS MODERATE 35: CPT | Mod: 25

## 2018-07-30 PROCEDURE — 52332 CYSTOSCOPY AND TREATMENT: CPT | Mod: 50

## 2018-07-30 PROCEDURE — 74420 UROGRAPHY RTRGR +-KUB: CPT | Mod: 26

## 2018-07-30 PROCEDURE — 93970 EXTREMITY STUDY: CPT | Mod: 26

## 2018-07-30 PROCEDURE — 71045 X-RAY EXAM CHEST 1 VIEW: CPT | Mod: 26

## 2018-07-30 RX ORDER — HEPARIN SODIUM 5000 [USP'U]/ML
8500 INJECTION INTRAVENOUS; SUBCUTANEOUS EVERY 6 HOURS
Qty: 0 | Refills: 0 | Status: DISCONTINUED | OUTPATIENT
Start: 2018-07-30 | End: 2018-08-01

## 2018-07-30 RX ORDER — DEXTROSE 50 % IN WATER 50 %
25 SYRINGE (ML) INTRAVENOUS ONCE
Qty: 0 | Refills: 0 | Status: DISCONTINUED | OUTPATIENT
Start: 2018-07-30 | End: 2018-08-06

## 2018-07-30 RX ORDER — GLUCAGON INJECTION, SOLUTION 0.5 MG/.1ML
1 INJECTION, SOLUTION SUBCUTANEOUS ONCE
Qty: 0 | Refills: 0 | Status: DISCONTINUED | OUTPATIENT
Start: 2018-07-30 | End: 2018-08-06

## 2018-07-30 RX ORDER — SODIUM CHLORIDE 0.65 %
1 AEROSOL, SPRAY (ML) NASAL THREE TIMES A DAY
Qty: 0 | Refills: 0 | Status: DISCONTINUED | OUTPATIENT
Start: 2018-07-30 | End: 2018-08-06

## 2018-07-30 RX ORDER — METOPROLOL TARTRATE 50 MG
25 TABLET ORAL
Qty: 0 | Refills: 0 | Status: DISCONTINUED | OUTPATIENT
Start: 2018-07-30 | End: 2018-08-06

## 2018-07-30 RX ORDER — SODIUM CHLORIDE 9 MG/ML
1000 INJECTION, SOLUTION INTRAVENOUS
Qty: 0 | Refills: 0 | Status: DISCONTINUED | OUTPATIENT
Start: 2018-07-30 | End: 2018-08-06

## 2018-07-30 RX ORDER — DEXTROSE 50 % IN WATER 50 %
15 SYRINGE (ML) INTRAVENOUS ONCE
Qty: 0 | Refills: 0 | Status: DISCONTINUED | OUTPATIENT
Start: 2018-07-30 | End: 2018-08-06

## 2018-07-30 RX ORDER — HEPARIN SODIUM 5000 [USP'U]/ML
4000 INJECTION INTRAVENOUS; SUBCUTANEOUS EVERY 6 HOURS
Qty: 0 | Refills: 0 | Status: DISCONTINUED | OUTPATIENT
Start: 2018-07-30 | End: 2018-08-01

## 2018-07-30 RX ORDER — HEPARIN SODIUM 5000 [USP'U]/ML
1500 INJECTION INTRAVENOUS; SUBCUTANEOUS
Qty: 25000 | Refills: 0 | Status: DISCONTINUED | OUTPATIENT
Start: 2018-07-30 | End: 2018-08-01

## 2018-07-30 RX ORDER — DEXTROSE 50 % IN WATER 50 %
12.5 SYRINGE (ML) INTRAVENOUS ONCE
Qty: 0 | Refills: 0 | Status: DISCONTINUED | OUTPATIENT
Start: 2018-07-30 | End: 2018-08-06

## 2018-07-30 RX ORDER — INSULIN LISPRO 100/ML
VIAL (ML) SUBCUTANEOUS
Qty: 0 | Refills: 0 | Status: DISCONTINUED | OUTPATIENT
Start: 2018-07-30 | End: 2018-08-06

## 2018-07-30 RX ADMIN — Medication 3 MILLILITER(S): at 08:48

## 2018-07-30 RX ADMIN — HEPARIN SODIUM 1500 UNIT(S)/HR: 5000 INJECTION INTRAVENOUS; SUBCUTANEOUS at 15:52

## 2018-07-30 RX ADMIN — HEPARIN SODIUM 5000 UNIT(S): 5000 INJECTION INTRAVENOUS; SUBCUTANEOUS at 05:53

## 2018-07-30 RX ADMIN — Medication 100 MILLIGRAM(S): at 05:51

## 2018-07-30 RX ADMIN — HEPARIN SODIUM 5000 UNIT(S): 5000 INJECTION INTRAVENOUS; SUBCUTANEOUS at 14:42

## 2018-07-30 RX ADMIN — Medication 25 MILLIGRAM(S): at 17:44

## 2018-07-30 RX ADMIN — Medication 3 MILLILITER(S): at 14:27

## 2018-07-30 RX ADMIN — Medication 100 MILLIGRAM(S): at 17:43

## 2018-07-30 RX ADMIN — Medication 1 SPRAY(S): at 14:42

## 2018-07-30 RX ADMIN — Medication 3 MILLILITER(S): at 20:41

## 2018-07-30 NOTE — PROGRESS NOTE ADULT - ASSESSMENT
Non oliguric PAUL, obstructive uroapthy-   Nephrolithiasis.   Atrophy of left kidney.  Renal function improved.      Follow intake and output.  Follow lab in am, with PO4 normalized.  K improved.    CHF reduced fluids intake - discussed with the patient.  Doppler of lower extermities r/o DVT    URI follow RVP and xr chest.      Stone workup as per Urology.  Once renal function stable, follow uric acid and urine work up for stone.

## 2018-07-30 NOTE — PROGRESS NOTE ADULT - SUBJECTIVE AND OBJECTIVE BOX
Problem List:  PAUL - post bilateral ureter stents placement.    Nephrolithiasis  with bilateral ureter stones and obstruction.  Small atrophic left kidney.    Non oliguric urine output 2000 in the last 16 hours.      PAST MEDICAL & SURGICAL HISTORY:  Kidney stones  HTN (hypertension)      penicillin (Rash)      Problem List:    PAST MEDICAL & SURGICAL HISTORY:  Kidney stones  HTN (hypertension)  No significant past surgical history      penicillin (Rash)      MEDICATIONS  (STANDING):  ALBUTerol    90 MICROgram(s) HFA Inhaler 1 Puff(s) Inhalation every 4 hours  ALBUTerol/ipratropium for Nebulization 3 milliLiter(s) Nebulizer every 6 hours  ciprofloxacin   IVPB 200 milliGRAM(s) IV Intermittent every 12 hours  dextrose 5%. 1000 milliLiter(s) (50 mL/Hr) IV Continuous <Continuous>  dextrose 50% Injectable 12.5 Gram(s) IV Push once  dextrose 50% Injectable 25 Gram(s) IV Push once  dextrose 50% Injectable 25 Gram(s) IV Push once  heparin  Injectable 5000 Unit(s) SubCutaneous every 8 hours  insulin lispro (HumaLOG) corrective regimen sliding scale   SubCutaneous three times a day before meals  metoprolol tartrate 25 milliGRAM(s) Oral two times a day  sodium chloride 0.9%. 1000 milliLiter(s) (75 mL/Hr) IV Continuous <Continuous>  tiotropium 18 MICROgram(s) Capsule 1 Capsule(s) Inhalation daily    MEDICATIONS  (PRN):  dextrose 40% Gel 15 Gram(s) Oral once PRN Blood Glucose LESS THAN 70 milliGRAM(s)/deciliter  glucagon  Injectable 1 milliGRAM(s) IntraMuscular once PRN Glucose LESS THAN 70 milligrams/deciliter  oxyCODONE    5 mG/acetaminophen 325 mG 1 Tablet(s) Oral every 6 hours PRN Severe Pain (7 - 10)  sodium chloride 0.65% Nasal 1 Spray(s) Both Nostrils three times a day PRN Nasal Congestion                            11.0   9.1   )-----------( 199      ( 30 Jul 2018 07:25 )             33.7     07-30    141  |  109<H>  |  59<H>  ----------------------------<  102<H>  4.6   |  24  |  3.97<H>    Ca    9.2      30 Jul 2018 07:25  Phos  3.8     07-30  Mg     1.9     07-30    TPro  7.3  /  Alb  3.1<L>  /  TBili  0.3  /  DBili  x   /  AST  14  /  ALT  18  /  AlkPhos  67  07-28    PT/INR - ( 28 Jul 2018 10:54 )   PT: 11.2 sec;   INR: 1.03 ratio         PTT - ( 28 Jul 2018 10:54 )  PTT:29.8 sec        REVIEW OF SYSTEMS:  General: no fever no chills, no weight loss.  EYES/ENT: No visual changes;  No vertigo, no headache.  NECK: No pain or stiffness  RESPIRATORY: cough, nasal drip and sore throat.  CARDIOVASCULAR: No chest pain or palpitations.  Edema foot are left.  GASTROINTESTINAL: No abdominal or epigastric pain. No nausea, vomiting. No diarrhea or constipation. No melena.  GENITOURINARY: No dysuria, frequency, foamy urine, urinary urgency, incontinence or hematuria  NEUROLOGICAL: No numbness or weakness, no tremor , no dizziness.   Muscle skeletal : no joint pain and no swelling of joints and limbs.  SKIN: No itching, burning, rashes.        VITALS:  T(F): 98.6 (07-30-18 @ 05:45), Max: 98.6 (07-30-18 @ 05:45)  HR: 82 (07-30-18 @ 05:45)  BP: 143/70 (07-30-18 @ 05:45)  RR: 17 (07-30-18 @ 05:45)  SpO2: 100% (07-30-18 @ 05:45)  Wt(kg): --    07-29 @ 07:01  -  07-30 @ 07:00  --------------------------------------------------------  IN: 0 mL / OUT: 3200 mL / NET: -3200 mL        PHYSICAL EXAM:  Constitutional: well developed, no diaphoresis, no distress.  Neck: No JVD, no carotid bruit, supple, no adenopathy  Respiratory: Good air entrance B/L, no wheezes, rales or rhonchi  Cardiovascular: S1, S2, RRR, no pericardial rub, no murmur  Abdomen: BS+, soft, no tenderness, no bruit  Pelvis: bladder nondistended  Extremities:left foot  edema plus 1 tibial left trace  Pulses: All present  Neurological: A/O x 3, no focal deficits

## 2018-07-30 NOTE — PROGRESS NOTE ADULT - SUBJECTIVE AND OBJECTIVE BOX
CHIEF COMPLAINT:Patient is a 78y old  Female who presents with a chief complaint of obstructing renal stone (28 Jul 2018 12:28)    	  REVIEW OF SYSTEMS:  CONSTITUTIONAL: No fever, weight loss, or fatigue  EYES: No eye pain, visual disturbances, or discharge  ENMT:  No difficulty hearing, tinnitus, vertigo; No sinus or throat pain  NECK: No pain or stiffness  RESPIRATORY: No cough, wheezing, chills or hemoptysis; No Shortness of Breath  CARDIOVASCULAR: No chest pain, palpitations, passing out, dizziness, or leg swelling  GASTROINTESTINAL: No abdominal or epigastric pain. No nausea, vomiting, or hematemesis; No diarrhea or constipation. No melena or hematochezia.  GENITOURINARY: No dysuria, frequency, hematuria, or incontinence  NEUROLOGICAL: No headaches, memory loss, loss of strength, numbness, or tremors  SKIN: No itching, burning, rashes, or lesions   LYMPH Nodes: No enlarged glands  ENDOCRINE: No heat or cold intolerance; No hair loss  MUSCULOSKELETAL: No joint pain or swelling; No muscle, back, or extremity pain  PSYCHIATRIC: No depression, anxiety, mood swings, or difficulty sleeping  HEME/LYMPH: No easy bruising, or bleeding gums  ALLERY AND IMMUNOLOGIC: No hives or eczema	    [ ] All others negative	  [ ] Unable to obtain    PHYSICAL EXAM:  T(C): 37.1 (07-30-18 @ 21:29), Max: 37.1 (07-30-18 @ 21:29)  HR: 92 (07-30-18 @ 21:29) (82 - 106)  BP: 126/77 (07-30-18 @ 21:29) (126/77 - 154/72)  RR: 18 (07-30-18 @ 21:29) (17 - 18)  SpO2: 97% (07-30-18 @ 21:29) (94% - 100%)  Wt(kg): --  I&O's Summary    29 Jul 2018 07:01  -  30 Jul 2018 07:00  --------------------------------------------------------  IN: 0 mL / OUT: 3200 mL / NET: -3200 mL    30 Jul 2018 07:01  -  30 Jul 2018 23:33  --------------------------------------------------------  IN: 0 mL / OUT: 1000 mL / NET: -1000 mL        Appearance: Normal	  HEENT:   Normal oral mucosa, PERRL, EOMI	  Lymphatic: No lymphadenopathy  Cardiovascular: Normal S1 S2, No JVD, No murmurs, No edema  Respiratory: Lungs clear to auscultation	  Psychiatry: A & O x 3, Mood & affect appropriate  Gastrointestinal:  Soft, Non-tender, + BS	  Skin: No rashes, No ecchymoses, No cyanosis	  Neurologic: Non-focal  Extremities: Normal range of motion, No clubbing, cyanosis or edema  Vascular: Peripheral pulses palpable 2+ bilaterally    MEDICATIONS  (STANDING):  ALBUTerol    90 MICROgram(s) HFA Inhaler 1 Puff(s) Inhalation every 4 hours  ALBUTerol/ipratropium for Nebulization 3 milliLiter(s) Nebulizer every 6 hours  ciprofloxacin   IVPB 200 milliGRAM(s) IV Intermittent every 12 hours  dextrose 5%. 1000 milliLiter(s) (50 mL/Hr) IV Continuous <Continuous>  dextrose 50% Injectable 12.5 Gram(s) IV Push once  dextrose 50% Injectable 25 Gram(s) IV Push once  dextrose 50% Injectable 25 Gram(s) IV Push once  heparin  Infusion. 1500 Unit(s)/Hr (15 mL/Hr) IV Continuous <Continuous>  insulin lispro (HumaLOG) corrective regimen sliding scale   SubCutaneous three times a day before meals  metoprolol tartrate 25 milliGRAM(s) Oral two times a day  sodium chloride 0.9%. 1000 milliLiter(s) (75 mL/Hr) IV Continuous <Continuous>  tiotropium 18 MICROgram(s) Capsule 1 Capsule(s) Inhalation daily      TELEMETRY: 	    ECG:  	  RADIOLOGY:  OTHER: 	  	  CBC Full  -  ( 30 Jul 2018 22:12 )  WBC Count : 10.6 K/uL  Hemoglobin : 10.7 g/dL  Hematocrit : 32.7 %  Platelet Count - Automated : 215 K/uL  Mean Cell Volume : 87.9 fl  Mean Cell Hemoglobin : 28.6 pg  Mean Cell Hemoglobin Concentration : 32.6 gm/dL  Auto Neutrophil # : x  Auto Lymphocyte # : x  Auto Monocyte # : x  Auto Eosinophil # : x  Auto Basophil # : x  Auto Neutrophil % : x  Auto Lymphocyte % : x  Auto Monocyte % : x  Auto Eosinophil % : x  Auto Basophil % : x        CARDIAC MARKERS:                              10.7   10.6  )-----------( 215      ( 30 Jul 2018 22:12 )             32.7       07-30    141  |  109<H>  |  59<H>  ----------------------------<  102<H>  4.6   |  24  |  3.97<H>    Ca    9.2      30 Jul 2018 07:25  Phos  3.8     07-30  Mg     1.9     07-30              proBNP:   Lipid Profile: Cholesterol 136  LDL 90  HDL 28  TG 91    HgA1c: Hemoglobin A1C, Whole Blood: 6.1 % (07-29 @ 11:47)    TSH: Thyroid Stimulating Hormone, Serum: 0.14 uU/mL (07-29 @ 06:43)

## 2018-07-30 NOTE — PROGRESS NOTE ADULT - SUBJECTIVE AND OBJECTIVE BOX
Patient seen and examined bedside resting comfortably.  No complaints offered.   Denies nausea and vomiting. Tolerating diet.  Denies chest pain, dyspnea, cough.    T(F): 98.6 (07-30-18 @ 05:45), Max: 98.6 (07-30-18 @ 05:45)  HR: 82 (07-30-18 @ 05:45) (78 - 90)  BP: 143/70 (07-30-18 @ 05:45) (140/63 - 149/69)  RR: 17 (07-30-18 @ 05:45) (16 - 17)  SpO2: 100% (07-30-18 @ 05:45) (98% - 100%)    PHYSICAL EXAM:  General: NAD, WDWN  Neuro:  Alert & oriented x 3  Lung: respirations nonlabored, good inspiratory effort  Abdomen: soft, NT/ND.   Extremities: bilateral pedal edema  : Langford catheter in place with clear yellow urine: 3200ml/24hours.     LABS:                        11.0   9.1   )-----------( 199      ( 30 Jul 2018 07:25 )             33.7     07-30    141  |  109<H>  |  59<H>  ----------------------------<  102<H>  4.6   |  24  |  3.97<H>    Ca    9.2      30 Jul 2018 07:25  Phos  3.8     07-30  Mg     1.9     07-30    TPro  7.3  /  Alb  3.1<L>  /  TBili  0.3  /  DBili  x   /  AST  14  /  ALT  18  /  AlkPhos  67  07-28    PT/INR - ( 28 Jul 2018 10:54 )   PT: 11.2 sec;   INR: 1.03 ratio      PTT - ( 28 Jul 2018 10:54 )  PTT:29.8 sec  I&O's Detail    29 Jul 2018 07:01  -  30 Jul 2018 07:00  --------------------------------------------------------  IN:  Total IN: 0 mL    OUT:    Indwelling Catheter - Urethral: 3200 mL  Total OUT: 3200 mL    Total NET: -3200 mL    Impression: 78y Female s/p cystoscopy, and bilateral ureteral stent placement secondary to bilateral ureteral stones causing bilateral hydrouteronephrosis and acute renal failure POD #2     Plan:  - continue langford catheter, urine output monitoring and trend renal function  - continue antibiotics  - encourage po intake

## 2018-07-31 LAB
ANION GAP SERPL CALC-SCNC: 6 MMOL/L — SIGNIFICANT CHANGE UP (ref 5–17)
APTT BLD: 78 SEC — HIGH (ref 27.5–37.4)
APTT BLD: 78 SEC — HIGH (ref 27.5–37.4)
BUN SERPL-MCNC: 45 MG/DL — HIGH (ref 7–18)
CALCIUM SERPL-MCNC: 9.3 MG/DL — SIGNIFICANT CHANGE UP (ref 8.4–10.5)
CHLORIDE SERPL-SCNC: 109 MMOL/L — HIGH (ref 96–108)
CO2 SERPL-SCNC: 27 MMOL/L — SIGNIFICANT CHANGE UP (ref 22–31)
CREAT SERPL-MCNC: 2.53 MG/DL — HIGH (ref 0.5–1.3)
GLUCOSE BLDC GLUCOMTR-MCNC: 110 MG/DL — HIGH (ref 70–99)
GLUCOSE BLDC GLUCOMTR-MCNC: 110 MG/DL — HIGH (ref 70–99)
GLUCOSE BLDC GLUCOMTR-MCNC: 150 MG/DL — HIGH (ref 70–99)
GLUCOSE BLDC GLUCOMTR-MCNC: 160 MG/DL — HIGH (ref 70–99)
GLUCOSE SERPL-MCNC: 101 MG/DL — HIGH (ref 70–99)
HCT VFR BLD CALC: 34.8 % — SIGNIFICANT CHANGE UP (ref 34.5–45)
HGB BLD-MCNC: 11.4 G/DL — LOW (ref 11.5–15.5)
MCHC RBC-ENTMCNC: 28.6 PG — SIGNIFICANT CHANGE UP (ref 27–34)
MCHC RBC-ENTMCNC: 32.7 GM/DL — SIGNIFICANT CHANGE UP (ref 32–36)
MCV RBC AUTO: 87.5 FL — SIGNIFICANT CHANGE UP (ref 80–100)
PLATELET # BLD AUTO: 236 K/UL — SIGNIFICANT CHANGE UP (ref 150–400)
POTASSIUM SERPL-MCNC: 4.6 MMOL/L — SIGNIFICANT CHANGE UP (ref 3.5–5.3)
POTASSIUM SERPL-SCNC: 4.6 MMOL/L — SIGNIFICANT CHANGE UP (ref 3.5–5.3)
RBC # BLD: 3.98 M/UL — SIGNIFICANT CHANGE UP (ref 3.8–5.2)
RBC # FLD: 12.1 % — SIGNIFICANT CHANGE UP (ref 10.3–14.5)
SODIUM SERPL-SCNC: 142 MMOL/L — SIGNIFICANT CHANGE UP (ref 135–145)
WBC # BLD: 10.3 K/UL — SIGNIFICANT CHANGE UP (ref 3.8–10.5)
WBC # FLD AUTO: 10.3 K/UL — SIGNIFICANT CHANGE UP (ref 3.8–10.5)

## 2018-07-31 RX ORDER — WARFARIN SODIUM 2.5 MG/1
5 TABLET ORAL ONCE
Qty: 0 | Refills: 0 | Status: COMPLETED | OUTPATIENT
Start: 2018-07-31 | End: 2018-07-31

## 2018-07-31 RX ADMIN — Medication 3 MILLILITER(S): at 09:20

## 2018-07-31 RX ADMIN — Medication 100 MILLIGRAM(S): at 17:05

## 2018-07-31 RX ADMIN — Medication 25 MILLIGRAM(S): at 17:04

## 2018-07-31 RX ADMIN — Medication 100 MILLIGRAM(S): at 07:21

## 2018-07-31 RX ADMIN — Medication 3 MILLILITER(S): at 15:33

## 2018-07-31 RX ADMIN — Medication 25 MILLIGRAM(S): at 07:21

## 2018-07-31 RX ADMIN — HEPARIN SODIUM 1500 UNIT(S)/HR: 5000 INJECTION INTRAVENOUS; SUBCUTANEOUS at 12:27

## 2018-07-31 RX ADMIN — HEPARIN SODIUM 1500 UNIT(S)/HR: 5000 INJECTION INTRAVENOUS; SUBCUTANEOUS at 01:19

## 2018-07-31 RX ADMIN — WARFARIN SODIUM 5 MILLIGRAM(S): 2.5 TABLET ORAL at 21:42

## 2018-07-31 RX ADMIN — Medication 3 MILLILITER(S): at 20:32

## 2018-07-31 NOTE — PHYSICAL THERAPY INITIAL EVALUATION ADULT - PERTINENT HX OF CURRENT PROBLEM, REHAB EVAL
Patient was brought to ED from home with kidney stone issues and PAUL. Patient had DVT on R peroneal as of Imaging on 7/30/18;however, patient has been on anti-coagulant and cleared for PT as per RN

## 2018-07-31 NOTE — PHYSICAL THERAPY INITIAL EVALUATION ADULT - WEIGHT-BEARING RESTRICTIONS: SIT/STAND, REHAB EVAL
"Chief Complaint   Patient presents with     Gyn Exam       Initial /78  Pulse 60  Ht 5' 6\" (1.676 m)  Wt 193 lb (87.5 kg)  BMI 31.15 kg/m2 Estimated body mass index is 31.15 kg/(m^2) as calculated from the following:    Height as of this encounter: 5' 6\" (1.676 m).    Weight as of this encounter: 193 lb (87.5 kg).  Medication Reconciliation: leno Oro      "
Urology Progress Note    Subjective     Pt is without c/o.     Medications  • ciprofloxacin  500 mg Oral BID Abreakfast & HS   • sodium chloride (PF)  2 mL Injection Once   • acetaminophen  650 mg Oral Once           Objective     Intake/Output  I/O last 3 completed shifts:  In: 930 [P.O.:930]  Out: -         Physical Exam     Vital Signs Temp:  [97.7 °F (36.5 °C)-100.4 °F (38 °C)] 97.7 °F (36.5 °C)  Pulse:  [73-81] 73  Resp:  [20-22] 20  BP: (122-140)/(60-68) 137/67   General Appearance: Sitting comfortable in the chair.    Abdomen/Genitalia Soft and benign.  No SP distention     Labs        Recent Labs  Lab 07/26/17  0600 07/25/17  0605 07/24/17  1120   WBC 4.3 6.4 3.2*   RBC 4.14* 3.85* 4.53   HGB 11.6* 10.8* 12.9*   HCT 36.8* 34.2* 39.2   MCV 88.9 88.8 86.5   MCH 28.0 28.1 28.5   MCHC 31.5* 31.6* 32.9   PLT 73* 76* 98*       Recent Labs  Lab 07/26/17  0600 07/25/17  0605 07/24/17  1120   CO2 22 22 22   BUN 10 13 22*   CREATININE 0.51* 0.59* 0.68   CALCIUM 8.2* 7.7* 8.6        Assessment:     1. Hx of hematuria secondary to martinez catheter placement  2. Chronic incontinence  3. Chronic retention     Plan:     1. Family wishes to leave catheter out if at all possible.    2. They understand issues of chronic retention, risk of UTI and incontinence.    3. I think he will be best managed conservatively with comfort measures and avoidance of an indwelling catheter if possible.      Kiran Bustillos MD  7/26/2017 9:44 AM    
full weight-bearing

## 2018-07-31 NOTE — PROGRESS NOTE ADULT - PROBLEM SELECTOR PLAN 2
requiring urological intervention. Done yesterday. doing well. with no complaints.  Ciprofloxacin for urologic procedure prophylaxis. will follow.

## 2018-07-31 NOTE — PHYSICAL THERAPY INITIAL EVALUATION ADULT - LIVES WITH, PROFILE
children/Patient stated that she lives with her son in a private house on the 2nd floor(uses chair lift) and hence no stairs to negotiate

## 2018-07-31 NOTE — PROGRESS NOTE ADULT - ASSESSMENT
s/p cysto, and b/l ureteral stents 7/28 for hydronephrosis    1- f/u AM labs  2- trend cr (trending down)  3- voiding well  4- recc d/c to home once medically stable and cleared, and f/u with Dr Caldwell as outpt for b/l stent removal. please call office to make an appointment

## 2018-07-31 NOTE — PROGRESS NOTE ADULT - SUBJECTIVE AND OBJECTIVE BOX
Problem List:    PAST MEDICAL & SURGICAL HISTORY:  Kidney stones  HTN (hypertension)  No significant past surgical history      penicillin (Rash)      MEDICATIONS  (STANDING):  ALBUTerol    90 MICROgram(s) HFA Inhaler 1 Puff(s) Inhalation every 4 hours  ALBUTerol/ipratropium for Nebulization 3 milliLiter(s) Nebulizer every 6 hours  ciprofloxacin   IVPB 200 milliGRAM(s) IV Intermittent every 12 hours  dextrose 5%. 1000 milliLiter(s) (50 mL/Hr) IV Continuous <Continuous>  dextrose 50% Injectable 12.5 Gram(s) IV Push once  dextrose 50% Injectable 25 Gram(s) IV Push once  dextrose 50% Injectable 25 Gram(s) IV Push once  heparin  Infusion. 1500 Unit(s)/Hr (15 mL/Hr) IV Continuous <Continuous>  insulin lispro (HumaLOG) corrective regimen sliding scale   SubCutaneous three times a day before meals  metoprolol tartrate 25 milliGRAM(s) Oral two times a day  sodium chloride 0.9%. 1000 milliLiter(s) (75 mL/Hr) IV Continuous <Continuous>  tiotropium 18 MICROgram(s) Capsule 1 Capsule(s) Inhalation daily  warfarin 5 milliGRAM(s) Oral once    MEDICATIONS  (PRN):  dextrose 40% Gel 15 Gram(s) Oral once PRN Blood Glucose LESS THAN 70 milliGRAM(s)/deciliter  glucagon  Injectable 1 milliGRAM(s) IntraMuscular once PRN Glucose LESS THAN 70 milligrams/deciliter  heparin  Injectable 8500 Unit(s) IV Push every 6 hours PRN For aPTT less than 40  heparin  Injectable 4000 Unit(s) IV Push every 6 hours PRN For aPTT between 40 - 57  oxyCODONE    5 mG/acetaminophen 325 mG 1 Tablet(s) Oral every 6 hours PRN Severe Pain (7 - 10)  sodium chloride 0.65% Nasal 1 Spray(s) Both Nostrils three times a day PRN Nasal Congestion                            11.4   10.3  )-----------( 236      ( 31 Jul 2018 08:37 )             34.8     07-31    142  |  109<H>  |  45<H>  ----------------------------<  101<H>  4.6   |  27  |  2.53<H>    Ca    9.3      31 Jul 2018 08:37  Phos  3.8     07-30  Mg     1.9     07-30      PTT - ( 31 Jul 2018 08:37 )  PTT:78.0 sec        REVIEW OF SYSTEMS:  General: no fever no chills, no weight loss.  EYES/ENT: No visual changes;  No vertigo, no headache.  NECK: No pain or stiffness  RESPIRATORY: No cough, wheezing, hemoptysis; No shortness of breath  CARDIOVASCULAR: No chest pain or palpitations. No Edema  GASTROINTESTINAL: No abdominal or epigastric pain. No nausea, vomiting. No diarrhea or constipation. No melena.  GENITOURINARY: No dysuria, frequency, foamy urine, urinary urgency, incontinence or hematuria  NEUROLOGICAL: No numbness or weakness, no tremor , no dizziness.   Muscle skeletal : no joint pain and no swelling of joints and limbs.  SKIN: No itching, burning, rashes.        VITALS:  T(F): 98.8 (07-31-18 @ 05:18), Max: 99.3 (07-31-18 @ 00:41)  HR: 77 (07-31-18 @ 10:22)  BP: 127/81 (07-31-18 @ 10:22)  RR: 18 (07-31-18 @ 05:18)  SpO2: 93% (07-31-18 @ 10:22)  Wt(kg): --    07-30 @ 07:01  -  07-31 @ 07:00  --------------------------------------------------------  IN: 0 mL / OUT: 2900 mL / NET: -2900 mL        PHYSICAL EXAM:  Constitutional: well developed, no diaphoresis, no distress.  Neck: No JVD, no carotid bruit, supple, no adenopathy  Respiratory: Good air entrance B/L, no wheezes, rales or rhonchi  Cardiovascular: S1, S2, RRR, no pericardial rub, no murmur  Abdomen: BS+, soft, no tenderness, no bruit  Pelvis: bladder nondistended  Extremities: No cyanosis or clubbing. No peripheral edema.   Pulses: All present  Neurological: A/O x 3, no focal deficits  Psychiatric: Normal mood, normal affect  Skin: No rashes  Vascular Access: Problem List:  PAUL , POST bilateral ureter stents due to bilateral stones  Non oliguric  Weaver catheter was removed in am.    PAST MEDICAL & SURGICAL HISTORY:  Kidney stones  HTN (hypertension)    penicillin (Rash)      MEDICATIONS  (STANDING):  ALBUTerol    90 MICROgram(s) HFA Inhaler 1 Puff(s) Inhalation every 4 hours  ALBUTerol/ipratropium for Nebulization 3 milliLiter(s) Nebulizer every 6 hours  ciprofloxacin   IVPB 200 milliGRAM(s) IV Intermittent every 12 hours  dextrose 5%. 1000 milliLiter(s) (50 mL/Hr) IV Continuous <Continuous>  dextrose 50% Injectable 12.5 Gram(s) IV Push once  dextrose 50% Injectable 25 Gram(s) IV Push once  dextrose 50% Injectable 25 Gram(s) IV Push once  heparin  Infusion. 1500 Unit(s)/Hr (15 mL/Hr) IV Continuous <Continuous>  insulin lispro (HumaLOG) corrective regimen sliding scale   SubCutaneous three times a day before meals  metoprolol tartrate 25 milliGRAM(s) Oral two times a day  sodium chloride 0.9%. 1000 milliLiter(s) (75 mL/Hr) IV Continuous <Continuous>  tiotropium 18 MICROgram(s) Capsule 1 Capsule(s) Inhalation daily  warfarin 5 milliGRAM(s) Oral once    MEDICATIONS  (PRN):  dextrose 40% Gel 15 Gram(s) Oral once PRN Blood Glucose LESS THAN 70 milliGRAM(s)/deciliter  glucagon  Injectable 1 milliGRAM(s) IntraMuscular once PRN Glucose LESS THAN 70 milligrams/deciliter  heparin  Injectable 8500 Unit(s) IV Push every 6 hours PRN For aPTT less than 40  heparin  Injectable 4000 Unit(s) IV Push every 6 hours PRN For aPTT between 40 - 57  oxyCODONE    5 mG/acetaminophen 325 mG 1 Tablet(s) Oral every 6 hours PRN Severe Pain (7 - 10)  sodium chloride 0.65% Nasal 1 Spray(s) Both Nostrils three times a day PRN Nasal Congestion                            11.4   10.3  )-----------( 236      ( 31 Jul 2018 08:37 )             34.8     07-31    142  |  109<H>  |  45<H>  ----------------------------<  101<H>  4.6   |  27  |  2.53<H>    Ca    9.3      31 Jul 2018 08:37  Phos  3.8     07-30  Mg     1.9     07-30      PTT - ( 31 Jul 2018 08:37 )  PTT:78.0 sec        REVIEW OF SYSTEMS:  General: no fever no chills, no weight loss.  EYES/ENT: No visual changes;  No vertigo, no headache.  NECK: No pain or stiffness  RESPIRATORY: No cough, wheezing, hemoptysis; No shortness of breath  CARDIOVASCULAR: No chest pain or palpitations. No Edema  GASTROINTESTINAL: No abdominal or epigastric pain. No nausea, vomiting. No diarrhea or constipation. No melena.  GENITOURINARY: No dysuria, frequency, foamy urine, urinary urgency, incontinence or hematuria  NEUROLOGICAL: No numbness or weakness, no tremor , no dizziness.   Muscle skeletal : no joint pain and no swelling of joints and limbs.  SKIN: No itching, burning, rashes.        VITALS:  T(F): 98.8 (07-31-18 @ 05:18), Max: 99.3 (07-31-18 @ 00:41)  HR: 77 (07-31-18 @ 10:22)  BP: 127/81 (07-31-18 @ 10:22)  RR: 18 (07-31-18 @ 05:18)  SpO2: 93% (07-31-18 @ 10:22)  Wt(kg): --    07-30 @ 07:01  -  07-31 @ 07:00  --------------------------------------------------------  IN: 0 mL / OUT: 2900 mL / NET: -2900 mL        PHYSICAL EXAM:  Constitutional: well developed, no diaphoresis, no distress.  Neck: No JVD, no carotid bruit, supple, no adenopathy  Respiratory: Good air entrance B/L, no wheezes, rales or rhonchi  Cardiovascular: S1, S2, RRR, no pericardial rub, no murmur  Abdomen: BS+, soft, no tenderness, no bruit  Pelvis: bladder nondistended  Extremities: trace edema

## 2018-07-31 NOTE — PROGRESS NOTE ADULT - ASSESSMENT
Non oliguric PAUL, obstructive uroapthy-   Nephrolithiasis.   Atrophy of left kidney.  Renal function improved.      Follow intake and output.  Follow lab in am, with PO4 normalized.  K improved.    CHF reduced fluids intake - discussed with the patient.  Doppler of lower extremities r/o DVT.  Started on anti coagulation as per surgery    URI follow RVP and xr chest.      Stone workup as per Urology.  Once renal function stable, follow uric acid and urine work up for stone.

## 2018-07-31 NOTE — PROGRESS NOTE ADULT - SUBJECTIVE AND OBJECTIVE BOX
78y Female with no overnight complaints. Tolerating reg diet.  voiding well. no abd pain  AM labs pending    Vital Signs:  T(C): 37.1 (07-31-18 @ 05:18), Max: 37.4 (07-31-18 @ 00:41)  HR: 78 (07-31-18 @ 05:18) (78 - 106)  BP: 143/58 (07-31-18 @ 05:18) (126/77 - 154/72)  RR: 18 (07-31-18 @ 05:18) (17 - 18)  SpO2: 95% (07-31-18 @ 05:18) (94% - 99%)  Wt(kg): --    Physical Exam:  General: NAD, comfortable  Abdomen: soft, NT/ND. no CVA tenderness b/l    Ins/Outs:    07-30 @ 07:01  -  07-31 @ 07:00  --------------------------------------------------------  IN:  Total IN: 0 mL    OUT:    Indwelling Catheter - Urethral: 1000 mL    Voided: 1900 mL  Total OUT: 2900 mL    Total NET: -2900 mL

## 2018-07-31 NOTE — PROGRESS NOTE ADULT - SUBJECTIVE AND OBJECTIVE BOX
CHIEF COMPLAINT:Patient is a 78y old  Female who presents with a chief complaint of obstructing renal stone (28 Jul 2018 12:28)    	  REVIEW OF SYSTEMS:  CONSTITUTIONAL: No fever, weight loss, or fatigue  EYES: No eye pain, visual disturbances, or discharge  ENMT:  No difficulty hearing, tinnitus, vertigo; No sinus or throat pain  NECK: No pain or stiffness  RESPIRATORY: No cough, wheezing, chills or hemoptysis; No Shortness of Breath  CARDIOVASCULAR: No chest pain, palpitations, passing out, dizziness, or leg swelling  GASTROINTESTINAL: No abdominal or epigastric pain. No nausea, vomiting, or hematemesis; No diarrhea or constipation. No melena or hematochezia.  GENITOURINARY: No dysuria, frequency, hematuria, or incontinence  NEUROLOGICAL: No headaches, memory loss, loss of strength, numbness, or tremors  SKIN: No itching, burning, rashes, or lesions   LYMPH Nodes: No enlarged glands  ENDOCRINE: No heat or cold intolerance; No hair loss  MUSCULOSKELETAL: No joint pain or swelling; No muscle, back, or extremity pain  PSYCHIATRIC: No depression, anxiety, mood swings, or difficulty sleeping  HEME/LYMPH: No easy bruising, or bleeding gums  ALLERY AND IMMUNOLOGIC: No hives or eczema	    [ ] All others negative	  [ ] Unable to obtain    PHYSICAL EXAM:  T(C): 36.9 (07-31-18 @ 20:56), Max: 37.4 (07-31-18 @ 00:41)  HR: 78 (07-31-18 @ 20:56) (77 - 88)  BP: 129/73 (07-31-18 @ 20:56) (121/69 - 143/58)  RR: 16 (07-31-18 @ 20:56) (16 - 18)  SpO2: 99% (07-31-18 @ 20:56) (92% - 99%)  Wt(kg): --  I&O's Summary    30 Jul 2018 07:01  -  31 Jul 2018 07:00  --------------------------------------------------------  IN: 0 mL / OUT: 2900 mL / NET: -2900 mL        Appearance: Normal	  HEENT:   Normal oral mucosa, PERRL, EOMI	  Lymphatic: No lymphadenopathy  Cardiovascular: Normal S1 S2, No JVD, No murmurs, No edema  Respiratory: Lungs clear to auscultation	  Psychiatry: A & O x 3, Mood & affect appropriate  Gastrointestinal:  Soft, Non-tender, + BS	  Skin: No rashes, No ecchymoses, No cyanosis	  Neurologic: Non-focal  Extremities: Normal range of motion, No clubbing, cyanosis or edema  Vascular: Peripheral pulses palpable 2+ bilaterally    MEDICATIONS  (STANDING):  ALBUTerol    90 MICROgram(s) HFA Inhaler 1 Puff(s) Inhalation every 4 hours  ALBUTerol/ipratropium for Nebulization 3 milliLiter(s) Nebulizer every 6 hours  ciprofloxacin   IVPB 200 milliGRAM(s) IV Intermittent every 12 hours  dextrose 5%. 1000 milliLiter(s) (50 mL/Hr) IV Continuous <Continuous>  dextrose 50% Injectable 12.5 Gram(s) IV Push once  dextrose 50% Injectable 25 Gram(s) IV Push once  dextrose 50% Injectable 25 Gram(s) IV Push once  heparin  Infusion. 1500 Unit(s)/Hr (15 mL/Hr) IV Continuous <Continuous>  insulin lispro (HumaLOG) corrective regimen sliding scale   SubCutaneous three times a day before meals  metoprolol tartrate 25 milliGRAM(s) Oral two times a day  sodium chloride 0.9%. 1000 milliLiter(s) (75 mL/Hr) IV Continuous <Continuous>  tiotropium 18 MICROgram(s) Capsule 1 Capsule(s) Inhalation daily      TELEMETRY: 	    ECG:  	  RADIOLOGY:  OTHER: 	  	  CBC Full  -  ( 31 Jul 2018 08:37 )  WBC Count : 10.3 K/uL  Hemoglobin : 11.4 g/dL  Hematocrit : 34.8 %  Platelet Count - Automated : 236 K/uL  Mean Cell Volume : 87.5 fl  Mean Cell Hemoglobin : 28.6 pg  Mean Cell Hemoglobin Concentration : 32.7 gm/dL  Auto Neutrophil # : x  Auto Lymphocyte # : x  Auto Monocyte # : x  Auto Eosinophil # : x  Auto Basophil # : x  Auto Neutrophil % : x  Auto Lymphocyte % : x  Auto Monocyte % : x  Auto Eosinophil % : x  Auto Basophil % : x        CARDIAC MARKERS:                              11.4   10.3  )-----------( 236      ( 31 Jul 2018 08:37 )             34.8       07-31    142  |  109<H>  |  45<H>  ----------------------------<  101<H>  4.6   |  27  |  2.53<H>    Ca    9.3      31 Jul 2018 08:37  Phos  3.8     07-30  Mg     1.9     07-30        PTT - ( 31 Jul 2018 08:37 )  PTT:78.0 sec      proBNP:   Lipid Profile: Cholesterol 136  LDL 90  HDL 28  TG 91    HgA1c: Hemoglobin A1C, Whole Blood: 6.1 % (07-29 @ 11:47)    TSH: Thyroid Stimulating Hormone, Serum: 0.14 uU/mL (07-29 @ 06:43)

## 2018-08-01 LAB
APTT BLD: 62.8 SEC — HIGH (ref 27.5–37.4)
GLUCOSE BLDC GLUCOMTR-MCNC: 108 MG/DL — HIGH (ref 70–99)
GLUCOSE BLDC GLUCOMTR-MCNC: 112 MG/DL — HIGH (ref 70–99)
GLUCOSE BLDC GLUCOMTR-MCNC: 134 MG/DL — HIGH (ref 70–99)
GLUCOSE BLDC GLUCOMTR-MCNC: 147 MG/DL — HIGH (ref 70–99)
HCT VFR BLD CALC: 34.4 % — LOW (ref 34.5–45)
HGB BLD-MCNC: 11.1 G/DL — LOW (ref 11.5–15.5)
INR BLD: 1.11 RATIO — SIGNIFICANT CHANGE UP (ref 0.88–1.16)
MCHC RBC-ENTMCNC: 28.2 PG — SIGNIFICANT CHANGE UP (ref 27–34)
MCHC RBC-ENTMCNC: 32.2 GM/DL — SIGNIFICANT CHANGE UP (ref 32–36)
MCV RBC AUTO: 87.5 FL — SIGNIFICANT CHANGE UP (ref 80–100)
PLATELET # BLD AUTO: 252 K/UL — SIGNIFICANT CHANGE UP (ref 150–400)
PROTHROM AB SERPL-ACNC: 12.1 SEC — SIGNIFICANT CHANGE UP (ref 9.8–12.7)
RBC # BLD: 3.93 M/UL — SIGNIFICANT CHANGE UP (ref 3.8–5.2)
RBC # FLD: 12.1 % — SIGNIFICANT CHANGE UP (ref 10.3–14.5)
WBC # BLD: 11.3 K/UL — HIGH (ref 3.8–10.5)
WBC # FLD AUTO: 11.3 K/UL — HIGH (ref 3.8–10.5)

## 2018-08-01 RX ORDER — HEPARIN SODIUM 5000 [USP'U]/ML
INJECTION INTRAVENOUS; SUBCUTANEOUS
Qty: 25000 | Refills: 0 | Status: DISCONTINUED | OUTPATIENT
Start: 2018-08-01 | End: 2018-08-01

## 2018-08-01 RX ORDER — HEPARIN SODIUM 5000 [USP'U]/ML
1500 INJECTION INTRAVENOUS; SUBCUTANEOUS
Qty: 25000 | Refills: 0 | Status: DISCONTINUED | OUTPATIENT
Start: 2018-08-01 | End: 2018-08-02

## 2018-08-01 RX ORDER — HEPARIN SODIUM 5000 [USP'U]/ML
4000 INJECTION INTRAVENOUS; SUBCUTANEOUS EVERY 6 HOURS
Qty: 0 | Refills: 0 | Status: DISCONTINUED | OUTPATIENT
Start: 2018-08-01 | End: 2018-08-02

## 2018-08-01 RX ORDER — APIXABAN 2.5 MG/1
10 TABLET, FILM COATED ORAL EVERY 12 HOURS
Qty: 0 | Refills: 0 | Status: DISCONTINUED | OUTPATIENT
Start: 2018-08-01 | End: 2018-08-01

## 2018-08-01 RX ORDER — WARFARIN SODIUM 2.5 MG/1
5 TABLET ORAL ONCE
Qty: 0 | Refills: 0 | Status: DISCONTINUED | OUTPATIENT
Start: 2018-08-01 | End: 2018-08-01

## 2018-08-01 RX ORDER — HEPARIN SODIUM 5000 [USP'U]/ML
8500 INJECTION INTRAVENOUS; SUBCUTANEOUS EVERY 6 HOURS
Qty: 0 | Refills: 0 | Status: DISCONTINUED | OUTPATIENT
Start: 2018-08-01 | End: 2018-08-02

## 2018-08-01 RX ADMIN — Medication 3 MILLILITER(S): at 14:55

## 2018-08-01 RX ADMIN — Medication 25 MILLIGRAM(S): at 17:17

## 2018-08-01 RX ADMIN — Medication 3 MILLILITER(S): at 09:36

## 2018-08-01 RX ADMIN — Medication 100 MILLIGRAM(S): at 17:17

## 2018-08-01 RX ADMIN — HEPARIN SODIUM 1500 UNIT(S)/HR: 5000 INJECTION INTRAVENOUS; SUBCUTANEOUS at 07:26

## 2018-08-01 RX ADMIN — Medication 3 MILLILITER(S): at 20:09

## 2018-08-01 RX ADMIN — Medication 25 MILLIGRAM(S): at 05:40

## 2018-08-01 RX ADMIN — Medication 100 MILLIGRAM(S): at 05:40

## 2018-08-01 NOTE — PROGRESS NOTE ADULT - ASSESSMENT
Patient admitted for concerns of acute renal failure with post-obstructive component, requiring urological stent placement and further monitoring of urine output and renal functions.    Spoke with patients son and  today in detail.

## 2018-08-01 NOTE — PROGRESS NOTE ADULT - SUBJECTIVE AND OBJECTIVE BOX
78y Female with no overnight complaints. Tolerating reg diet.  voiding well. no abd pain. Offers no complaints     Vital Signs Last 24 Hrs  T(C): 36.9 (01 Aug 2018 05:26), Max: 36.9 (31 Jul 2018 16:50)  T(F): 98.5 (01 Aug 2018 05:26), Max: 98.5 (31 Jul 2018 16:50)  HR: 74 (01 Aug 2018 05:26) (74 - 88)  BP: 137/62 (01 Aug 2018 05:26) (121/69 - 137/62)  BP(mean): --  RR: 16 (01 Aug 2018 05:26) (16 - 18)  SpO2: 97% (01 Aug 2018 05:26) (92% - 99%)    	    Physical Exam:  General: NAD, comfortable  Abdomen: soft, NT/ND. no CVA tenderness b/l                          11.1   11.3  )-----------( 252      ( 01 Aug 2018 06:57 )             34.4   07-31    142  |  109<H>  |  45<H>  ----------------------------<  101<H>  4.6   |  27  |  2.53<H>    Ca    9.3      31 Jul 2018 08:37

## 2018-08-01 NOTE — PROGRESS NOTE ADULT - PROBLEM SELECTOR PLAN 5
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 Will get GYN consult for follow up

## 2018-08-01 NOTE — PROGRESS NOTE ADULT - PROBLEM SELECTOR PLAN 6
Patient with DVT. Will continue Heparin drip. as the renal function is improving will start eliquis when creatinine is in the 2 range.

## 2018-08-01 NOTE — PROGRESS NOTE ADULT - SUBJECTIVE AND OBJECTIVE BOX
CHIEF COMPLAINT:Patient is a 78y old  Female who presents with a chief complaint of obstructing renal stone (28 Jul 2018 12:28)    	  REVIEW OF SYSTEMS:  CONSTITUTIONAL: No fever, weight loss, or fatigue  EYES: No eye pain, visual disturbances, or discharge  ENMT:  No difficulty hearing, tinnitus, vertigo; No sinus or throat pain  NECK: No pain or stiffness  RESPIRATORY: No cough, wheezing, chills or hemoptysis; No Shortness of Breath  CARDIOVASCULAR: No chest pain, palpitations, passing out, dizziness, or leg swelling  GASTROINTESTINAL: No abdominal or epigastric pain. No nausea, vomiting, or hematemesis; No diarrhea or constipation. No melena or hematochezia.  GENITOURINARY: No dysuria, frequency, hematuria, or incontinence  NEUROLOGICAL: No headaches, memory loss, loss of strength, numbness, or tremors  SKIN: No itching, burning, rashes, or lesions   LYMPH Nodes: No enlarged glands  ENDOCRINE: No heat or cold intolerance; No hair loss  MUSCULOSKELETAL: No joint pain or swelling; No muscle, back, or extremity pain  PSYCHIATRIC: No depression, anxiety, mood swings, or difficulty sleeping  HEME/LYMPH: No easy bruising, or bleeding gums  ALLERY AND IMMUNOLOGIC: No hives or eczema	    [ ] All others negative	  [ ] Unable to obtain    PHYSICAL EXAM:  T(C): 36.9 (08-01-18 @ 20:31), Max: 36.9 (08-01-18 @ 05:26)  HR: 72 (08-01-18 @ 20:31) (72 - 87)  BP: 130/83 (08-01-18 @ 20:31) (128/62 - 138/70)  RR: 17 (08-01-18 @ 20:31) (16 - 17)  SpO2: 95% (08-01-18 @ 20:31) (95% - 97%)  Wt(kg): --  I&O's Summary    01 Aug 2018 07:01  -  01 Aug 2018 23:13  --------------------------------------------------------  IN: 400 mL / OUT: 2 mL / NET: 398 mL        Appearance: Normal	  HEENT:   Normal oral mucosa, PERRL, EOMI	  Lymphatic: No lymphadenopathy  Cardiovascular: Normal S1 S2, No JVD, No murmurs, No edema  Respiratory: Lungs clear to auscultation	  Psychiatry: A & O x 3, Mood & affect appropriate  Gastrointestinal:  Soft, Non-tender, + BS	  Skin: No rashes, No ecchymoses, No cyanosis	  Neurologic: Non-focal  Extremities: Normal range of motion, No clubbing, cyanosis or edema  Vascular: Peripheral pulses palpable 2+ bilaterally    MEDICATIONS  (STANDING):  ALBUTerol    90 MICROgram(s) HFA Inhaler 1 Puff(s) Inhalation every 4 hours  ALBUTerol/ipratropium for Nebulization 3 milliLiter(s) Nebulizer every 6 hours  ciprofloxacin   IVPB 200 milliGRAM(s) IV Intermittent every 12 hours  dextrose 5%. 1000 milliLiter(s) (50 mL/Hr) IV Continuous <Continuous>  dextrose 50% Injectable 12.5 Gram(s) IV Push once  dextrose 50% Injectable 25 Gram(s) IV Push once  dextrose 50% Injectable 25 Gram(s) IV Push once  heparin  Infusion. 1500 Unit(s)/Hr (15 mL/Hr) IV Continuous <Continuous>  insulin lispro (HumaLOG) corrective regimen sliding scale   SubCutaneous three times a day before meals  metoprolol tartrate 25 milliGRAM(s) Oral two times a day  sodium chloride 0.9%. 1000 milliLiter(s) (75 mL/Hr) IV Continuous <Continuous>  tiotropium 18 MICROgram(s) Capsule 1 Capsule(s) Inhalation daily      TELEMETRY: 	    ECG:  	  RADIOLOGY:  OTHER: 	  	  CBC Full  -  ( 01 Aug 2018 06:57 )  WBC Count : 11.3 K/uL  Hemoglobin : 11.1 g/dL  Hematocrit : 34.4 %  Platelet Count - Automated : 252 K/uL  Mean Cell Volume : 87.5 fl  Mean Cell Hemoglobin : 28.2 pg  Mean Cell Hemoglobin Concentration : 32.2 gm/dL  Auto Neutrophil # : x  Auto Lymphocyte # : x  Auto Monocyte # : x  Auto Eosinophil # : x  Auto Basophil # : x  Auto Neutrophil % : x  Auto Lymphocyte % : x  Auto Monocyte % : x  Auto Eosinophil % : x  Auto Basophil % : x        CARDIAC MARKERS:                              11.1   11.3  )-----------( 252      ( 01 Aug 2018 06:57 )             34.4       07-31    142  |  109<H>  |  45<H>  ----------------------------<  101<H>  4.6   |  27  |  2.53<H>    Ca    9.3      31 Jul 2018 08:37        PT/INR - ( 01 Aug 2018 06:57 )   PT: 12.1 sec;   INR: 1.11 ratio         PTT - ( 01 Aug 2018 06:57 )  PTT:62.8 sec      proBNP:   Lipid Profile: Cholesterol 136  LDL 90  HDL 28  TG 91    HgA1c: Hemoglobin A1C, Whole Blood: 6.1 % (07-29 @ 11:47)    TSH: Thyroid Stimulating Hormone, Serum: 0.14 uU/mL (07-29 @ 06:43)        	      < from: US Duplex Venous Lower Ext Complete, Bilateral (07.30.18 @ 14:03) >  EXAM:  US DPLX LWR EXT VEINS COMPL BI                            PROCEDURE DATE:  07/30/2018          INTERPRETATION:  EXAM: US DPLX LWR EXT VEINS COMPL BI   INDICATION: bialteral leg edema.  COMPARISON: None.    TECHNIQUE: Multiple static images from duplex sonography of the deep   veins of the bilateral lower extremities utilizing color and spectral   Doppler interrogation, without and with compression.    FINDINGS:  There is DVT in the right peroneal vein.    The bilateral common femoral, superficial femoral, popliteal and   visualized left calf veins are normally compressible and demonstrate   normal spontaneous and phasic flow and/or augmentation. There is no   evidence of deep vein thrombosis.    IMPRESSION:  Right peroneal DVT.    Dr. Rm was informed at the conclusion of the study.    < end of copied text >

## 2018-08-01 NOTE — PROGRESS NOTE ADULT - ASSESSMENT
Non oliguric PAUL, obstructive uroapthy-   Nephrolithiasis.   Atrophy of left kidney.  Renal function improved post stent placement, foly cathter removed with out urinary difficulties.    Follow intake and output.  Follow lab in am, with PO4 normalized.  Follow cmp today      Doppler of lower extremities r/o DVT.\ showed peroneal thrombus below knee right.   thrombus  Started on anti coagulation as per surgery          Stone workup as per Urology.  Once renal function stable, follow uric acid and urine work up for stone.

## 2018-08-01 NOTE — PROGRESS NOTE ADULT - SUBJECTIVE AND OBJECTIVE BOX
Problem List:  PAUL , POST bilateral ureter stents due to bilateral stones  Non oliguric  Weaver catheter was removed in am.  Patient has been urinating well since that time.      PAST MEDICAL & SURGICAL HISTORY:  Kidney stones  HTN (hypertension)  No significant past surgical history      penicillin (Rash)      MEDICATIONS  (STANDING):  ALBUTerol    90 MICROgram(s) HFA Inhaler 1 Puff(s) Inhalation every 4 hours  ALBUTerol/ipratropium for Nebulization 3 milliLiter(s) Nebulizer every 6 hours  ciprofloxacin   IVPB 200 milliGRAM(s) IV Intermittent every 12 hours  dextrose 5%. 1000 milliLiter(s) (50 mL/Hr) IV Continuous <Continuous>  dextrose 50% Injectable 12.5 Gram(s) IV Push once  dextrose 50% Injectable 25 Gram(s) IV Push once  dextrose 50% Injectable 25 Gram(s) IV Push once  heparin  Infusion. 1500 Unit(s)/Hr (15 mL/Hr) IV Continuous <Continuous>  insulin lispro (HumaLOG) corrective regimen sliding scale   SubCutaneous three times a day before meals  metoprolol tartrate 25 milliGRAM(s) Oral two times a day  sodium chloride 0.9%. 1000 milliLiter(s) (75 mL/Hr) IV Continuous <Continuous>  tiotropium 18 MICROgram(s) Capsule 1 Capsule(s) Inhalation daily  warfarin 5 milliGRAM(s) Oral once    MEDICATIONS  (PRN):  dextrose 40% Gel 15 Gram(s) Oral once PRN Blood Glucose LESS THAN 70 milliGRAM(s)/deciliter  glucagon  Injectable 1 milliGRAM(s) IntraMuscular once PRN Glucose LESS THAN 70 milligrams/deciliter  heparin  Injectable 8500 Unit(s) IV Push every 6 hours PRN For aPTT less than 40  heparin  Injectable 4000 Unit(s) IV Push every 6 hours PRN For aPTT between 40 - 57  oxyCODONE    5 mG/acetaminophen 325 mG 1 Tablet(s) Oral every 6 hours PRN Severe Pain (7 - 10)  sodium chloride 0.65% Nasal 1 Spray(s) Both Nostrils three times a day PRN Nasal Congestion    Repeat lab pending                        11.1   11.3  )-----------( 252      ( 01 Aug 2018 06:57 )             34.4     07-31    142  |  109<H>  |  45<H>  ----------------------------<  101<H>  4.6   |  27  |  2.53<H>    Ca    9.3      31 Jul 2018 08:37      PT/INR - ( 01 Aug 2018 06:57 )   PT: 12.1 sec;   INR: 1.11 ratio         PTT - ( 01 Aug 2018 06:57 )  PTT:62.8 sec        REVIEW OF SYSTEMS:  General: no fever no chills, no weight loss.    RESPIRATORY: No cough, wheezing, hemoptysis; No shortness of breath  CARDIOVASCULAR: No chest pain or palpitations. No Edema  GASTROINTESTINAL: No abdominal or epigastric pain. No nausea, vomiting. No diarrhea or constipation. No melena.  GENITOURINARY: No dysuria, frequency, foamy urine, urinary urgency, incontinence or hematuria  NEUROLOGICAL: No numbness or weakness, no tremor , no dizziness.   Muscle skeletal : no joint pain and no swelling of joints and limbs.  SKIN: No itching, burning, rashes.        VITALS:  T(F): 98.5 (08-01-18 @ 05:26), Max: 98.5 (07-31-18 @ 16:50)  HR: 74 (08-01-18 @ 05:26)  BP: 137/62 (08-01-18 @ 05:26)  RR: 16 (08-01-18 @ 05:26)  SpO2: 97% (08-01-18 @ 05:26)  Wt(kg): --      PHYSICAL EXAM:  Constitutional: well developed, no diaphoresis, no distress.  Neck: No JVD, no carotid bruit, supple, no adenopathy  Respiratory: Good air entrance B/L, no wheezes, rales or rhonchi  Cardiovascular: S1, S2, RRR, no pericardial rub, no murmur  Abdomen: BS+, soft, no tenderness, no bruit  Pelvis: bladder nondistended  Extremities: No cyanosis or clubbing. edema trace in ankle areas bial;teral

## 2018-08-01 NOTE — PROGRESS NOTE ADULT - ASSESSMENT
s/p cysto, and b/l ureteral stents 7/28 for hydronephrosis    1- heparin bridge to coumadin   2- trend cr (trending down)  3- voiding well  4- recc d/c to home once medically stable and cleared, and f/u with Dr Caldwell as outpt for b/l stent removal. please call office to make an appointment

## 2018-08-02 DIAGNOSIS — I82.409 ACUTE EMBOLISM AND THROMBOSIS OF UNSPECIFIED DEEP VEINS OF UNSPECIFIED LOWER EXTREMITY: ICD-10-CM

## 2018-08-02 LAB
ANION GAP SERPL CALC-SCNC: 9 MMOL/L — SIGNIFICANT CHANGE UP (ref 5–17)
APTT BLD: 57.6 SEC — HIGH (ref 27.5–37.4)
BUN SERPL-MCNC: 39 MG/DL — HIGH (ref 7–18)
CALCIUM SERPL-MCNC: 9.3 MG/DL — SIGNIFICANT CHANGE UP (ref 8.4–10.5)
CHLORIDE SERPL-SCNC: 108 MMOL/L — SIGNIFICANT CHANGE UP (ref 96–108)
CO2 SERPL-SCNC: 24 MMOL/L — SIGNIFICANT CHANGE UP (ref 22–31)
CREAT SERPL-MCNC: 2.03 MG/DL — HIGH (ref 0.5–1.3)
GLUCOSE BLDC GLUCOMTR-MCNC: 103 MG/DL — HIGH (ref 70–99)
GLUCOSE BLDC GLUCOMTR-MCNC: 128 MG/DL — HIGH (ref 70–99)
GLUCOSE BLDC GLUCOMTR-MCNC: 131 MG/DL — HIGH (ref 70–99)
GLUCOSE BLDC GLUCOMTR-MCNC: 143 MG/DL — HIGH (ref 70–99)
GLUCOSE SERPL-MCNC: 111 MG/DL — HIGH (ref 70–99)
HCT VFR BLD CALC: 33.7 % — LOW (ref 34.5–45)
HGB BLD-MCNC: 11.1 G/DL — LOW (ref 11.5–15.5)
INR BLD: 1.2 RATIO — HIGH (ref 0.88–1.16)
MCHC RBC-ENTMCNC: 28.8 PG — SIGNIFICANT CHANGE UP (ref 27–34)
MCHC RBC-ENTMCNC: 33 GM/DL — SIGNIFICANT CHANGE UP (ref 32–36)
MCV RBC AUTO: 87.4 FL — SIGNIFICANT CHANGE UP (ref 80–100)
PLATELET # BLD AUTO: 235 K/UL — SIGNIFICANT CHANGE UP (ref 150–400)
POTASSIUM SERPL-MCNC: 4.3 MMOL/L — SIGNIFICANT CHANGE UP (ref 3.5–5.3)
POTASSIUM SERPL-SCNC: 4.3 MMOL/L — SIGNIFICANT CHANGE UP (ref 3.5–5.3)
PROTHROM AB SERPL-ACNC: 13.1 SEC — HIGH (ref 9.8–12.7)
RBC # BLD: 3.86 M/UL — SIGNIFICANT CHANGE UP (ref 3.8–5.2)
RBC # FLD: 12.4 % — SIGNIFICANT CHANGE UP (ref 10.3–14.5)
SODIUM SERPL-SCNC: 141 MMOL/L — SIGNIFICANT CHANGE UP (ref 135–145)
WBC # BLD: 12.1 K/UL — HIGH (ref 3.8–10.5)
WBC # FLD AUTO: 12.1 K/UL — HIGH (ref 3.8–10.5)

## 2018-08-02 RX ORDER — APIXABAN 2.5 MG/1
10 TABLET, FILM COATED ORAL EVERY 12 HOURS
Qty: 0 | Refills: 0 | Status: DISCONTINUED | OUTPATIENT
Start: 2018-08-02 | End: 2018-08-06

## 2018-08-02 RX ADMIN — Medication 3 MILLILITER(S): at 09:37

## 2018-08-02 RX ADMIN — Medication 3 MILLILITER(S): at 14:44

## 2018-08-02 RX ADMIN — Medication 100 MILLIGRAM(S): at 05:58

## 2018-08-02 RX ADMIN — Medication 25 MILLIGRAM(S): at 17:39

## 2018-08-02 RX ADMIN — HEPARIN SODIUM 1500 UNIT(S)/HR: 5000 INJECTION INTRAVENOUS; SUBCUTANEOUS at 06:54

## 2018-08-02 RX ADMIN — Medication 100 MILLIGRAM(S): at 17:39

## 2018-08-02 RX ADMIN — Medication 3 MILLILITER(S): at 20:40

## 2018-08-02 RX ADMIN — Medication 3 MILLILITER(S): at 02:30

## 2018-08-02 RX ADMIN — APIXABAN 10 MILLIGRAM(S): 2.5 TABLET, FILM COATED ORAL at 17:39

## 2018-08-02 RX ADMIN — Medication 25 MILLIGRAM(S): at 05:58

## 2018-08-02 NOTE — PROGRESS NOTE ADULT - ASSESSMENT
Patient admitted for concerns of acute renal failure with post-obstructive component, requiring urological stent placement and further monitoring of urine output and renal functions.

## 2018-08-02 NOTE — PROGRESS NOTE ADULT - PROBLEM SELECTOR PLAN 3
-patient on losartan and metoprolol at home, will hold for procedure  -continue to monitor BP -requiring urological intervention. Done yesterday. doing well. with no complaints.

## 2018-08-02 NOTE — PROGRESS NOTE ADULT - ASSESSMENT
Non oliguric PAUL, obstructive uroapthy-   Nephrolithiasis.   Atrophy of left kidney.  Renal function improved post stent placement, foly cathter removed with out urinary difficulties.    Follow intake and output.  Follow lab in am, with PO4 normalized.  Follow cmp today      Doppler of lower extremities r/o DVT.\ showed peroneal thrombus below knee right.   thrombus  Started on anti coagulation as per surgery.          Stone workup as per Urology.  Once renal function stable, follow uric acid and urine work up for stone.

## 2018-08-02 NOTE — PROGRESS NOTE ADULT - PROBLEM SELECTOR PLAN 2
-requiring urological intervention. Done yesterday. doing well. with no complaints.  -Ciprofloxacin for urologic procedure prophylaxis. will follow. -Peroneal DVT  -Heparin Drip Held today  -Will start eliquis this afternoon now that pts renal function has improved  -Loading dose 10mg BID x 14 doses

## 2018-08-02 NOTE — PROGRESS NOTE ADULT - SUBJECTIVE AND OBJECTIVE BOX
Problem List:  PAUL , POST bilateral ureter stents due to bilateral stones  Non oliguric  Weaver catheter was removed in am.  Patient has been urinating well since that time.    right bellow knee DVT.    PAST MEDICAL & SURGICAL HISTORY:  Kidney stones  HTN (hypertension)  No significant past surgical history      penicillin (Rash)      MEDICATIONS  (STANDING):  ALBUTerol    90 MICROgram(s) HFA Inhaler 1 Puff(s) Inhalation every 4 hours  ALBUTerol/ipratropium for Nebulization 3 milliLiter(s) Nebulizer every 6 hours  apixaban 10 milliGRAM(s) Oral every 12 hours  ciprofloxacin   IVPB 200 milliGRAM(s) IV Intermittent every 12 hours  dextrose 5%. 1000 milliLiter(s) (50 mL/Hr) IV Continuous <Continuous>  dextrose 50% Injectable 12.5 Gram(s) IV Push once  dextrose 50% Injectable 25 Gram(s) IV Push once  dextrose 50% Injectable 25 Gram(s) IV Push once  insulin lispro (HumaLOG) corrective regimen sliding scale   SubCutaneous three times a day before meals  metoprolol tartrate 25 milliGRAM(s) Oral two times a day  sodium chloride 0.9%. 1000 milliLiter(s) (75 mL/Hr) IV Continuous <Continuous>  tiotropium 18 MICROgram(s) Capsule 1 Capsule(s) Inhalation daily    MEDICATIONS  (PRN):  dextrose 40% Gel 15 Gram(s) Oral once PRN Blood Glucose LESS THAN 70 milliGRAM(s)/deciliter  glucagon  Injectable 1 milliGRAM(s) IntraMuscular once PRN Glucose LESS THAN 70 milligrams/deciliter  oxyCODONE    5 mG/acetaminophen 325 mG 1 Tablet(s) Oral every 6 hours PRN Severe Pain (7 - 10)  sodium chloride 0.65% Nasal 1 Spray(s) Both Nostrils three times a day PRN Nasal Congestion                            11.1   12.1  )-----------( 235      ( 02 Aug 2018 06:32 )             33.7     08-02    141  |  108  |  39<H>  ----------------------------<  111<H>  4.3   |  24  |  2.03<H>    Ca    9.3      02 Aug 2018 06:32  Creatinine, Serum: 2.53 mg/dL (07.31.18 @ 08:37)  Blood Urea Nitrogen, Serum: 45 mg/dL (07.31.18 @ 08:37)          PT/INR - ( 02 Aug 2018 06:32 )   PT: 13.1 sec;   INR: 1.20 ratio         PTT - ( 02 Aug 2018 06:32 )  PTT:57.6 sec        REVIEW OF SYSTEMS:  General: no fever no chills, no weight loss.    RESPIRATORY: No cough, wheezing, hemoptysis; No shortness of breath  CARDIOVASCULAR: No chest pain or palpitations. No Edema  GASTROINTESTINAL: No abdominal or epigastric pain. No nausea, vomiting. No diarrhea or constipation. No melena.  GENITOURINARY: No dysuria, frequency, foamy urine, urinary urgency, incontinence or hematuria  NEUROLOGICAL: No numbness or weakness, no tremor , no dizziness.   Muscle skeletal : no joint pain and no swelling of joints and limbs        VITALS:  T(F): 98.6 (08-02-18 @ 05:08), Max: 98.6 (08-02-18 @ 05:08)  HR: 86 (08-02-18 @ 05:08)  BP: 129/83 (08-02-18 @ 05:08)  RR: 16 (08-02-18 @ 05:08)  SpO2: 98% (08-02-18 @ 05:08)  Wt(kg): --    08-01 @ 07:01  -  08-02 @ 07:00  --------------------------------------------------------  IN: 400 mL / OUT: 2 mL / NET: 398 mL        PHYSICAL EXAM:  Constitutional: well developed, no diaphoresis, no distress.  Neck: No JVD, no carotid bruit, supple, no adenopathy  Respiratory: Good air entrance B/L, no wheezes, rales or rhonchi  Cardiovascular: S1, S2, RRR, no pericardial rub, no murmur  Abdomen: BS+, soft, no tenderness, no bruit  Pelvis: bladder nondistended  Extremities: No cyanosis or clubbing. No peripheral edema.   Pulses: All present  Neurological: A/O x 3, no focal deficits

## 2018-08-02 NOTE — PROGRESS NOTE ADULT - PROBLEM SELECTOR PLAN 6
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   -GYN consult for follow up

## 2018-08-02 NOTE — PROGRESS NOTE ADULT - PROBLEM SELECTOR PLAN 5
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   -GYN consult for follow up -s/p 1 dose of duonebs, likely due to fluid overload from obstruction  -continue to monitor respiratory status

## 2018-08-02 NOTE — PROGRESS NOTE ADULT - PROBLEM SELECTOR PLAN 4
-s/p 1 dose of duonebs, likely due to fluid overload from obstruction  -continue to monitor respiratory status -patient on losartan and metoprolol at home, will hold for procedure  -continue to monitor BP

## 2018-08-02 NOTE — PROGRESS NOTE ADULT - SUBJECTIVE AND OBJECTIVE BOX
PGY 2 Note discussed with primary attending    Patient is a 78y old  Female who presents with a chief complaint of obstructing renal stone (28 Jul 2018 12:28)      INTERVAL HPI/OVERNIGHT EVENTS: No acute events reported overnight.    Today pt presents in no acute distress.  Pt found resting comfortably in bed.      MEDICATIONS  (STANDING):  ALBUTerol    90 MICROgram(s) HFA Inhaler 1 Puff(s) Inhalation every 4 hours  ALBUTerol/ipratropium for Nebulization 3 milliLiter(s) Nebulizer every 6 hours  ciprofloxacin   IVPB 200 milliGRAM(s) IV Intermittent every 12 hours  dextrose 5%. 1000 milliLiter(s) (50 mL/Hr) IV Continuous <Continuous>  dextrose 50% Injectable 12.5 Gram(s) IV Push once  dextrose 50% Injectable 25 Gram(s) IV Push once  dextrose 50% Injectable 25 Gram(s) IV Push once  heparin  Infusion. 1500 Unit(s)/Hr (15 mL/Hr) IV Continuous <Continuous>  insulin lispro (HumaLOG) corrective regimen sliding scale   SubCutaneous three times a day before meals  metoprolol tartrate 25 milliGRAM(s) Oral two times a day  sodium chloride 0.9%. 1000 milliLiter(s) (75 mL/Hr) IV Continuous <Continuous>  tiotropium 18 MICROgram(s) Capsule 1 Capsule(s) Inhalation daily    MEDICATIONS  (PRN):  dextrose 40% Gel 15 Gram(s) Oral once PRN Blood Glucose LESS THAN 70 milliGRAM(s)/deciliter  glucagon  Injectable 1 milliGRAM(s) IntraMuscular once PRN Glucose LESS THAN 70 milligrams/deciliter  heparin  Injectable 8500 Unit(s) IV Push every 6 hours PRN For aPTT less than 40  heparin  Injectable 4000 Unit(s) IV Push every 6 hours PRN For aPTT between 40 - 57  oxyCODONE    5 mG/acetaminophen 325 mG 1 Tablet(s) Oral every 6 hours PRN Severe Pain (7 - 10)  sodium chloride 0.65% Nasal 1 Spray(s) Both Nostrils three times a day PRN Nasal Congestion      __________________________________________________  REVIEW OF SYSTEMS:    CONSTITUTIONAL: No fever,   EYES: no acute visual disturbances  NECK: No pain or stiffness  RESPIRATORY: No cough; No shortness of breath  CARDIOVASCULAR: No chest pain, no palpitations  GASTROINTESTINAL: No pain. No nausea or vomiting; No diarrhea   NEUROLOGICAL: No headache or numbness, no tremors  MUSCULOSKELETAL: No joint pain, no muscle pain      Vital Signs Last 24 Hrs  T(C): 37 (02 Aug 2018 05:08), Max: 37 (02 Aug 2018 05:08)  T(F): 98.6 (02 Aug 2018 05:08), Max: 98.6 (02 Aug 2018 05:08)  HR: 86 (02 Aug 2018 05:08) (72 - 87)  BP: 129/83 (02 Aug 2018 05:08) (128/62 - 138/70)  RR: 16 (02 Aug 2018 05:08) (16 - 17)  SpO2: 98% (02 Aug 2018 05:08) (95% - 98%)    ________________________________________________  PHYSICAL EXAM:  GENERAL: NAD  HEENT:Normocephalic;  conjunctivae and sclerae clear; moist mucous membranes;   NECK : supple  CHEST/LUNG: Clear to auscuitation bilaterally with good air entry   HEART: S1 S2  regular; no murmurs, gallops or rubs  ABDOMEN: Soft, Nontender, Nondistended; Bowel sounds present  EXTREMITIES: no cyanosis; no edema; no calf tenderness  NERVOUS SYSTEM:  Awake and alert; Oriented  to place, person and time ; no new deficits    _________________________________________________  LABS:                        11.1   12.1  )-----------( 235      ( 02 Aug 2018 06:32 )             33.7     08-02    141  |  108  |  39<H>  ----------------------------<  111<H>  4.3   |  24  |  2.03<H>    Ca    9.3      02 Aug 2018 06:32      PT/INR - ( 02 Aug 2018 06:32 )   PT: 13.1 sec;   INR: 1.20 ratio         PTT - ( 02 Aug 2018 06:32 )  PTT:57.6 sec    CAPILLARY BLOOD GLUCOSE      POCT Blood Glucose.: 103 mg/dL (02 Aug 2018 07:46)  POCT Blood Glucose.: 134 mg/dL (01 Aug 2018 21:16)  POCT Blood Glucose.: 147 mg/dL (01 Aug 2018 16:19)  POCT Blood Glucose.: 112 mg/dL (01 Aug 2018 11:11)        RADIOLOGY & ADDITIONAL TESTS:    Imaging Personally Reviewed:  YES    Consultant(s) Notes Reviewed:   YES    Care Discussed with Consultants :     Plan of care was discussed with patient and /or primary care giver; all questions and concerns were addressed and care was aligned with patient's wishes. PGY 2 Note discussed with primary attending    Patient is a 78y old  Female who presents with a chief complaint of obstructing renal stone (28 Jul 2018 12:28)      INTERVAL HPI/OVERNIGHT EVENTS: No acute events reported overnight.    Today pt presents in no acute distress.  Pt found resting comfortably in bed.  No new complaints reported today.      MEDICATIONS  (STANDING):  ALBUTerol    90 MICROgram(s) HFA Inhaler 1 Puff(s) Inhalation every 4 hours  ALBUTerol/ipratropium for Nebulization 3 milliLiter(s) Nebulizer every 6 hours  apixaban 10 milliGRAM(s) Oral every 12 hours  ciprofloxacin   IVPB 200 milliGRAM(s) IV Intermittent every 12 hours  dextrose 5%. 1000 milliLiter(s) (50 mL/Hr) IV Continuous <Continuous>  dextrose 50% Injectable 12.5 Gram(s) IV Push once  dextrose 50% Injectable 25 Gram(s) IV Push once  dextrose 50% Injectable 25 Gram(s) IV Push once  insulin lispro (HumaLOG) corrective regimen sliding scale   SubCutaneous three times a day before meals  metoprolol tartrate 25 milliGRAM(s) Oral two times a day  sodium chloride 0.9%. 1000 milliLiter(s) (75 mL/Hr) IV Continuous <Continuous>  tiotropium 18 MICROgram(s) Capsule 1 Capsule(s) Inhalation daily    MEDICATIONS  (PRN):  dextrose 40% Gel 15 Gram(s) Oral once PRN Blood Glucose LESS THAN 70 milliGRAM(s)/deciliter  glucagon  Injectable 1 milliGRAM(s) IntraMuscular once PRN Glucose LESS THAN 70 milligrams/deciliter  oxyCODONE    5 mG/acetaminophen 325 mG 1 Tablet(s) Oral every 6 hours PRN Severe Pain (7 - 10)  sodium chloride 0.65% Nasal 1 Spray(s) Both Nostrils three times a day PRN Nasal Congestion      __________________________________________________  REVIEW OF SYSTEMS:    CONSTITUTIONAL: No fever,   EYES: no acute visual disturbances  NECK: No pain or stiffness  RESPIRATORY: No cough; No shortness of breath  CARDIOVASCULAR: No chest pain, no palpitations  GASTROINTESTINAL: No pain. No nausea or vomiting; No diarrhea   NEUROLOGICAL: No headache or numbness, no tremors  MUSCULOSKELETAL: No joint pain, no muscle pain; LE swelling      Vital Signs Last 24 Hrs  T(C): 37 (02 Aug 2018 05:08), Max: 37 (02 Aug 2018 05:08)  T(F): 98.6 (02 Aug 2018 05:08), Max: 98.6 (02 Aug 2018 05:08)  HR: 86 (02 Aug 2018 05:08) (72 - 87)  BP: 129/83 (02 Aug 2018 05:08) (128/62 - 138/70)  RR: 16 (02 Aug 2018 05:08) (16 - 17)  SpO2: 98% (02 Aug 2018 05:08) (95% - 98%)    ________________________________________________  PHYSICAL EXAM:  GENERAL: NAD  HEENT: Normocephalic;  conjunctivae and sclerae clear; moist mucous membranes;   NECK : supple  CHEST/LUNG: Clear to auscultation bilaterally with good air entry   HEART: S1 S2  regular; no murmurs, gallops or rubs  ABDOMEN: Soft, Nontender, Nondistended; Bowel sounds present  EXTREMITIES: no cyanosis; no edema; no calf tenderness; Bilat LE 1+ pitting edema  NERVOUS SYSTEM:  Awake and alert; Oriented  to place, person and time ; no new deficits    _________________________________________________  LABS:                        11.1   12.1  )-----------( 235      ( 02 Aug 2018 06:32 )             33.7     08-02    141  |  108  |  39<H>  ----------------------------<  111<H>  4.3   |  24  |  2.03<H>    Ca    9.3      02 Aug 2018 06:32      PT/INR - ( 02 Aug 2018 06:32 )   PT: 13.1 sec;   INR: 1.20 ratio         PTT - ( 02 Aug 2018 06:32 )  PTT:57.6 sec    CAPILLARY BLOOD GLUCOSE      POCT Blood Glucose.: 103 mg/dL (02 Aug 2018 07:46)  POCT Blood Glucose.: 134 mg/dL (01 Aug 2018 21:16)  POCT Blood Glucose.: 147 mg/dL (01 Aug 2018 16:19)  POCT Blood Glucose.: 112 mg/dL (01 Aug 2018 11:11)        RADIOLOGY & ADDITIONAL TESTS:    Imaging Personally Reviewed:  YES    Consultant(s) Notes Reviewed:   YES    Care Discussed with Consultants :     Plan of care was discussed with patient and /or primary care giver; all questions and concerns were addressed and care was aligned with patient's wishes.

## 2018-08-03 LAB
ANION GAP SERPL CALC-SCNC: 12 MMOL/L — SIGNIFICANT CHANGE UP (ref 5–17)
APPEARANCE UR: ABNORMAL
APTT BLD: 34 SEC — SIGNIFICANT CHANGE UP (ref 27.5–37.4)
BACTERIA # UR AUTO: ABNORMAL /HPF
BILIRUB UR-MCNC: NEGATIVE — SIGNIFICANT CHANGE UP
BUN SERPL-MCNC: 41 MG/DL — HIGH (ref 7–18)
CALCIUM SERPL-MCNC: 9.2 MG/DL — SIGNIFICANT CHANGE UP (ref 8.4–10.5)
CHLORIDE SERPL-SCNC: 107 MMOL/L — SIGNIFICANT CHANGE UP (ref 96–108)
CO2 SERPL-SCNC: 23 MMOL/L — SIGNIFICANT CHANGE UP (ref 22–31)
COLOR SPEC: ABNORMAL
COMMENT - URINE: SIGNIFICANT CHANGE UP
CREAT SERPL-MCNC: 2.15 MG/DL — HIGH (ref 0.5–1.3)
DIFF PNL FLD: ABNORMAL
EPI CELLS # UR: SIGNIFICANT CHANGE UP /HPF
GLUCOSE BLDC GLUCOMTR-MCNC: 117 MG/DL — HIGH (ref 70–99)
GLUCOSE BLDC GLUCOMTR-MCNC: 123 MG/DL — HIGH (ref 70–99)
GLUCOSE BLDC GLUCOMTR-MCNC: 133 MG/DL — HIGH (ref 70–99)
GLUCOSE BLDC GLUCOMTR-MCNC: 166 MG/DL — HIGH (ref 70–99)
GLUCOSE SERPL-MCNC: 116 MG/DL — HIGH (ref 70–99)
GLUCOSE UR QL: NEGATIVE — SIGNIFICANT CHANGE UP
HCT VFR BLD CALC: 34.9 % — SIGNIFICANT CHANGE UP (ref 34.5–45)
HGB BLD-MCNC: 11.6 G/DL — SIGNIFICANT CHANGE UP (ref 11.5–15.5)
INR BLD: 1.53 RATIO — HIGH (ref 0.88–1.16)
KETONES UR-MCNC: NEGATIVE — SIGNIFICANT CHANGE UP
LEUKOCYTE ESTERASE UR-ACNC: ABNORMAL
MAGNESIUM SERPL-MCNC: 1.6 MG/DL — SIGNIFICANT CHANGE UP (ref 1.6–2.6)
MCHC RBC-ENTMCNC: 29.2 PG — SIGNIFICANT CHANGE UP (ref 27–34)
MCHC RBC-ENTMCNC: 33.2 GM/DL — SIGNIFICANT CHANGE UP (ref 32–36)
MCV RBC AUTO: 87.9 FL — SIGNIFICANT CHANGE UP (ref 80–100)
NITRITE UR-MCNC: POSITIVE
PH UR: 5 — SIGNIFICANT CHANGE UP (ref 5–8)
PHOSPHATE SERPL-MCNC: 6.9 MG/DL — HIGH (ref 2.5–4.5)
PLATELET # BLD AUTO: 255 K/UL — SIGNIFICANT CHANGE UP (ref 150–400)
POTASSIUM SERPL-MCNC: 4.2 MMOL/L — SIGNIFICANT CHANGE UP (ref 3.5–5.3)
POTASSIUM SERPL-SCNC: 4.2 MMOL/L — SIGNIFICANT CHANGE UP (ref 3.5–5.3)
PROT UR-MCNC: 500 MG/DL
PROTHROM AB SERPL-ACNC: 16.8 SEC — HIGH (ref 9.8–12.7)
RBC # BLD: 3.97 M/UL — SIGNIFICANT CHANGE UP (ref 3.8–5.2)
RBC # FLD: 12.3 % — SIGNIFICANT CHANGE UP (ref 10.3–14.5)
RBC CASTS # UR COMP ASSIST: >50 /HPF (ref 0–2)
SODIUM SERPL-SCNC: 142 MMOL/L — SIGNIFICANT CHANGE UP (ref 135–145)
SP GR SPEC: 1.02 — SIGNIFICANT CHANGE UP (ref 1.01–1.02)
UROBILINOGEN FLD QL: NEGATIVE — SIGNIFICANT CHANGE UP
WBC # BLD: 15.7 K/UL — HIGH (ref 3.8–10.5)
WBC # FLD AUTO: 15.7 K/UL — HIGH (ref 3.8–10.5)
WBC UR QL: SIGNIFICANT CHANGE UP /HPF (ref 0–5)

## 2018-08-03 RX ORDER — CEFTRIAXONE 500 MG/1
INJECTION, POWDER, FOR SOLUTION INTRAMUSCULAR; INTRAVENOUS
Qty: 0 | Refills: 0 | Status: DISCONTINUED | OUTPATIENT
Start: 2018-08-03 | End: 2018-08-06

## 2018-08-03 RX ORDER — LACTOBACILLUS ACIDOPHILUS 100MM CELL
1 CAPSULE ORAL
Qty: 0 | Refills: 0 | Status: DISCONTINUED | OUTPATIENT
Start: 2018-08-03 | End: 2018-08-06

## 2018-08-03 RX ORDER — SEVELAMER CARBONATE 2400 MG/1
800 POWDER, FOR SUSPENSION ORAL THREE TIMES A DAY
Qty: 0 | Refills: 0 | Status: DISCONTINUED | OUTPATIENT
Start: 2018-08-03 | End: 2018-08-06

## 2018-08-03 RX ORDER — CEFTRIAXONE 500 MG/1
1 INJECTION, POWDER, FOR SOLUTION INTRAMUSCULAR; INTRAVENOUS ONCE
Qty: 0 | Refills: 0 | Status: COMPLETED | OUTPATIENT
Start: 2018-08-03 | End: 2018-08-03

## 2018-08-03 RX ORDER — CEFTRIAXONE 500 MG/1
1 INJECTION, POWDER, FOR SOLUTION INTRAMUSCULAR; INTRAVENOUS EVERY 24 HOURS
Qty: 0 | Refills: 0 | Status: DISCONTINUED | OUTPATIENT
Start: 2018-08-04 | End: 2018-08-06

## 2018-08-03 RX ORDER — SACCHAROMYCES BOULARDII 250 MG
250 POWDER IN PACKET (EA) ORAL
Qty: 0 | Refills: 0 | Status: DISCONTINUED | OUTPATIENT
Start: 2018-08-03 | End: 2018-08-03

## 2018-08-03 RX ADMIN — Medication 25 MILLIGRAM(S): at 17:26

## 2018-08-03 RX ADMIN — CEFTRIAXONE 100 GRAM(S): 500 INJECTION, POWDER, FOR SOLUTION INTRAMUSCULAR; INTRAVENOUS at 12:52

## 2018-08-03 RX ADMIN — Medication 3 MILLILITER(S): at 14:30

## 2018-08-03 RX ADMIN — Medication 100 MILLIGRAM(S): at 06:26

## 2018-08-03 RX ADMIN — Medication 1 TABLET(S): at 12:52

## 2018-08-03 RX ADMIN — APIXABAN 10 MILLIGRAM(S): 2.5 TABLET, FILM COATED ORAL at 17:26

## 2018-08-03 RX ADMIN — Medication 3 MILLILITER(S): at 20:35

## 2018-08-03 RX ADMIN — Medication 1 TABLET(S): at 17:26

## 2018-08-03 RX ADMIN — SEVELAMER CARBONATE 800 MILLIGRAM(S): 2400 POWDER, FOR SUSPENSION ORAL at 21:21

## 2018-08-03 RX ADMIN — APIXABAN 10 MILLIGRAM(S): 2.5 TABLET, FILM COATED ORAL at 06:26

## 2018-08-03 RX ADMIN — Medication 25 MILLIGRAM(S): at 06:26

## 2018-08-03 NOTE — DIETITIAN INITIAL EVALUATION ADULT. - DIET TYPE
no concentrated potassium/DASH/TLC (sodium and cholesterol restricted diet)/no concentrated phosphorus

## 2018-08-03 NOTE — CHART NOTE - NSCHARTNOTEFT_GEN_A_CORE
Upon Nutritional Assessment by the Registered Dietitian your patient was determined to meet criteria / has evidence of the following diagnosis/diagnoses:          [ ]  Mild Protein Calorie Malnutrition        [ ]  Moderate Protein Calorie Malnutrition        [ ] Severe Protein Calorie Malnutrition        [ ] Unspecified Protein Calorie Malnutrition        [ ] Underweight / BMI <19        [x ] Morbid Obesity / BMI > 40      Findings as based on:  •  Comprehensive nutrition assessment and consultation  •  Calorie counts (nutrient intake analysis)  •  Food acceptance and intake status from observations by staff  •  Follow up  •  Patient education  •  Intervention secondary to interdisciplinary rounds  •   concerns      Treatment:    The following diet has been recommended:      PROVIDER Section:     By signing this assessment you are acknowledging and agree with the diagnosis/diagnoses assigned by the Registered Dietitian    Comments: change diet to renal. DASH/TLC given decreased renal function, declined oral supplements

## 2018-08-03 NOTE — PROGRESS NOTE ADULT - PROBLEM SELECTOR PLAN 5
-s/p 1 dose of duonebs, likely due to fluid overload from obstruction  -continue to monitor respiratory status

## 2018-08-03 NOTE — DIETITIAN INITIAL EVALUATION ADULT. - PERTINENT LABORATORY DATA
08-03 Na142 mmol/L Glu 116 mg/dL<H> K+ 4.2 mmol/L Cr  2.15 mg/dL<H> BUN 41 mg/dL<H> 08-03 Phos 6.9 mg/dL<H> 07-28 Alb 3.1 g/dL<L> 07-29 [id number] 6.1 %<H> 07-29 Chol 136 mg/dL LDL 90 mg/dL HDL 28 mg/dL<L> Trig 91 mg/dL

## 2018-08-03 NOTE — PROGRESS NOTE ADULT - SUBJECTIVE AND OBJECTIVE BOX
Problem List:  PAUL due to obstructive uropathy  Nephrolithiasis  DVT RLE on Apixaban.      PAST MEDICAL & SURGICAL HISTORY:  Kidney stones  HTN (hypertension)  No significant past surgical history      penicillin (Rash)      MEDICATIONS  (STANDING):  ALBUTerol    90 MICROgram(s) HFA Inhaler 1 Puff(s) Inhalation every 4 hours  ALBUTerol/ipratropium for Nebulization 3 milliLiter(s) Nebulizer every 6 hours  apixaban 10 milliGRAM(s) Oral every 12 hours  cefTRIAXone   IVPB      dextrose 5%. 1000 milliLiter(s) (50 mL/Hr) IV Continuous <Continuous>  dextrose 50% Injectable 12.5 Gram(s) IV Push once  dextrose 50% Injectable 25 Gram(s) IV Push once  dextrose 50% Injectable 25 Gram(s) IV Push once  insulin lispro (HumaLOG) corrective regimen sliding scale   SubCutaneous three times a day before meals  lactobacillus acidophilus 1 Tablet(s) Oral three times a day with meals  metoprolol tartrate 25 milliGRAM(s) Oral two times a day  sodium chloride 0.9%. 1000 milliLiter(s) (75 mL/Hr) IV Continuous <Continuous>  tiotropium 18 MICROgram(s) Capsule 1 Capsule(s) Inhalation daily    MEDICATIONS  (PRN):  dextrose 40% Gel 15 Gram(s) Oral once PRN Blood Glucose LESS THAN 70 milliGRAM(s)/deciliter  glucagon  Injectable 1 milliGRAM(s) IntraMuscular once PRN Glucose LESS THAN 70 milligrams/deciliter  oxyCODONE    5 mG/acetaminophen 325 mG 1 Tablet(s) Oral every 6 hours PRN Severe Pain (7 - 10)  sodium chloride 0.65% Nasal 1 Spray(s) Both Nostrils three times a day PRN Nasal Congestion                            11.6   15.7  )-----------( 255      ( 03 Aug 2018 08:00 )             34.9     08-03    142  |  107  |  41<H>  ----------------------------<  116<H>  4.2   |  23  |  2.15<H>    Ca    9.2      03 Aug 2018 08:00  Phos  6.9     08-03  Mg     1.6     08-03      PT/INR - ( 03 Aug 2018 08:00 )   PT: 16.8 sec;   INR: 1.53 ratio         PTT - ( 03 Aug 2018 08:00 )  PTT:34.0 sec        REVIEW OF SYSTEMS:  General: no fever no chills, no weight loss.  EYES/ENT: No visual changes;  No vertigo, no headache.  NECK: No pain or stiffness  RESPIRATORY: No cough, wheezing, hemoptysis; No shortness of breath  CARDIOVASCULAR: No chest pain or palpitations. No Edema  GASTROINTESTINAL: 2 episodes of diarrhea in AM.  GENITOURINARY: No dysuria, frequency, foamy urine, urinary urgency, incontinence or hematuria  NEUROLOGICAL: No numbness or weakness, no tremor , no dizziness.   Muscle skeletal : no joint pain and no swelling of joints and limbs.  SKIN: No itching, burning, rashes.        VITALS:  T(F): 98.3 (08-03-18 @ 05:40), Max: 98.3 (08-03-18 @ 05:40)  HR: 87 (08-03-18 @ 10:32)  BP: 136/82 (08-03-18 @ 10:32)  RR: 17 (08-03-18 @ 05:40)  SpO2: 97% (08-03-18 @ 10:32)  Wt(kg): --      PHYSICAL EXAM:  Constitutional: well developed, no diaphoresis, no distress.  Neck: No JVD, no carotid bruit, supple, no adenopathy  Respiratory: Good air entrance B/L, no wheezes, rales or rhonchi  Cardiovascular: S1, S2, RRR, no pericardial rub, no murmur  Abdomen: BS+, soft, no tenderness, no bruit  Pelvis: bladder nondistended  Extremities: PLUS 1 LEFT ANKLE EDEMA

## 2018-08-03 NOTE — DIETITIAN INITIAL EVALUATION ADULT. - PROBLEM SELECTOR PLAN 2
requiring urological intervention  Ciprofloxacin for urologic procedure prophylaxis  Patient is at moderate-high risk for procedure

## 2018-08-03 NOTE — PROGRESS NOTE ADULT - ASSESSMENT
Patient admitted for concerns of acute renal failure with post-obstructive component, requiring urological stent placement and further monitoring of urine output and renal functions.    2 episodes of watery diarrhea today with elevated WBC count.  WIll stop cipro and start rocephin  Will send Cdiff if culture pos.

## 2018-08-03 NOTE — PROGRESS NOTE ADULT - ASSESSMENT
PAUL post bilateral stents placement.  Increased creatinine  Increased PO4 restart Renagel and follow po4 daily.    Leukocytosis, repeat UA and urine culture  Diarrhea follow C Diff toxin

## 2018-08-03 NOTE — PROGRESS NOTE ADULT - SUBJECTIVE AND OBJECTIVE BOX
PGY 2 Note discussed with primary attending    Patient is a 78y old  Female who presents with a chief complaint of obstructing renal stone (28 Jul 2018 12:28)    INTERVAL HPI/OVERNIGHT EVENTS: No acute events reported overnight.    Today pt presents in no acute distress.  Pt found resting comfortably in bed.  No new complaints reported today.    MEDICATIONS  (STANDING):  ALBUTerol    90 MICROgram(s) HFA Inhaler 1 Puff(s) Inhalation every 4 hours  ALBUTerol/ipratropium for Nebulization 3 milliLiter(s) Nebulizer every 6 hours  apixaban 10 milliGRAM(s) Oral every 12 hours  cefTRIAXone   IVPB      dextrose 5%. 1000 milliLiter(s) (50 mL/Hr) IV Continuous <Continuous>  dextrose 50% Injectable 12.5 Gram(s) IV Push once  dextrose 50% Injectable 25 Gram(s) IV Push once  dextrose 50% Injectable 25 Gram(s) IV Push once  insulin lispro (HumaLOG) corrective regimen sliding scale   SubCutaneous three times a day before meals  lactobacillus acidophilus 1 Tablet(s) Oral three times a day with meals  metoprolol tartrate 25 milliGRAM(s) Oral two times a day  sevelamer hydrochloride 800 milliGRAM(s) Oral three times a day  sodium chloride 0.9%. 1000 milliLiter(s) (75 mL/Hr) IV Continuous <Continuous>  tiotropium 18 MICROgram(s) Capsule 1 Capsule(s) Inhalation daily    MEDICATIONS  (PRN):  dextrose 40% Gel 15 Gram(s) Oral once PRN Blood Glucose LESS THAN 70 milliGRAM(s)/deciliter  glucagon  Injectable 1 milliGRAM(s) IntraMuscular once PRN Glucose LESS THAN 70 milligrams/deciliter  oxyCODONE    5 mG/acetaminophen 325 mG 1 Tablet(s) Oral every 6 hours PRN Severe Pain (7 - 10)  sodium chloride 0.65% Nasal 1 Spray(s) Both Nostrils three times a day PRN Nasal Congestion      __________________________________________________  REVIEW OF SYSTEMS:    CONSTITUTIONAL: No fever,   EYES: no acute visual disturbances  NECK: No pain or stiffness  RESPIRATORY: No cough; No shortness of breath  CARDIOVASCULAR: No chest pain, no palpitations  GASTROINTESTINAL: No pain. No nausea or vomiting; No diarrhea   NEUROLOGICAL: No headache or numbness, no tremors  MUSCULOSKELETAL: No joint pain, no muscle pain; LE swelling      Vital Signs Last 24 Hrs  T(C): 36.6 (03 Aug 2018 14:46), Max: 36.8 (03 Aug 2018 05:40)  T(F): 97.8 (03 Aug 2018 14:46), Max: 98.3 (03 Aug 2018 05:40)  HR: 83 (03 Aug 2018 14:46) (75 - 87)  BP: 133/113 (03 Aug 2018 14:46) (133/113 - 140/71)  RR: 17 (03 Aug 2018 14:46) (17 - 17)  SpO2: 97% (03 Aug 2018 14:46) (92% - 97%)    ________________________________________________  PHYSICAL EXAM:  GENERAL: NAD  HEENT: Normocephalic;  conjunctivae and sclerae clear; moist mucous membranes;   NECK : supple  CHEST/LUNG: Clear to auscultation bilaterally with good air entry   HEART: S1 S2  regular; no murmurs, gallops or rubs  ABDOMEN: Soft, Nontender, Nondistended; Bowel sounds present  EXTREMITIES: no cyanosis; no edema; no calf tenderness; Bilat LE 1+ pitting edema  NERVOUS SYSTEM:  Awake and alert; Oriented  to place, person and time ; no new deficits    _________________________________________________  LABS:                                   11.6   15.7  )-----------( 255      ( 03 Aug 2018 08:00 )             34.9     08-03    142  |  107  |  41<H>  ----------------------------<  116<H>  4.2   |  23  |  2.15<H>    Ca    9.2      03 Aug 2018 08:00  Phos  6.9     08-03  Mg     1.6     08-03        CAPILLARY BLOOD GLUCOSE      POCT Blood Glucose.: 103 mg/dL (02 Aug 2018 07:46)  POCT Blood Glucose.: 134 mg/dL (01 Aug 2018 21:16)  POCT Blood Glucose.: 147 mg/dL (01 Aug 2018 16:19)  POCT Blood Glucose.: 112 mg/dL (01 Aug 2018 11:11)        RADIOLOGY & ADDITIONAL TESTS:    Imaging Personally Reviewed:  YES    Consultant(s) Notes Reviewed:   YES    Care Discussed with Consultants :     Plan of care was discussed with patient and /or primary care giver; all questions and concerns were addressed and care was aligned with patient's wishes.

## 2018-08-04 LAB
ANION GAP SERPL CALC-SCNC: 10 MMOL/L — SIGNIFICANT CHANGE UP (ref 5–17)
BUN SERPL-MCNC: 49 MG/DL — HIGH (ref 7–18)
CALCIUM SERPL-MCNC: 9.1 MG/DL — SIGNIFICANT CHANGE UP (ref 8.4–10.5)
CHLORIDE SERPL-SCNC: 105 MMOL/L — SIGNIFICANT CHANGE UP (ref 96–108)
CO2 SERPL-SCNC: 23 MMOL/L — SIGNIFICANT CHANGE UP (ref 22–31)
CREAT SERPL-MCNC: 2.29 MG/DL — HIGH (ref 0.5–1.3)
GLUCOSE BLDC GLUCOMTR-MCNC: 114 MG/DL — HIGH (ref 70–99)
GLUCOSE BLDC GLUCOMTR-MCNC: 117 MG/DL — HIGH (ref 70–99)
GLUCOSE BLDC GLUCOMTR-MCNC: 125 MG/DL — HIGH (ref 70–99)
GLUCOSE BLDC GLUCOMTR-MCNC: 133 MG/DL — HIGH (ref 70–99)
GLUCOSE SERPL-MCNC: 106 MG/DL — HIGH (ref 70–99)
HCT VFR BLD CALC: 34.7 % — SIGNIFICANT CHANGE UP (ref 34.5–45)
HGB BLD-MCNC: 11.6 G/DL — SIGNIFICANT CHANGE UP (ref 11.5–15.5)
MAGNESIUM SERPL-MCNC: 1.8 MG/DL — SIGNIFICANT CHANGE UP (ref 1.6–2.6)
MCHC RBC-ENTMCNC: 29.4 PG — SIGNIFICANT CHANGE UP (ref 27–34)
MCHC RBC-ENTMCNC: 33.6 GM/DL — SIGNIFICANT CHANGE UP (ref 32–36)
MCV RBC AUTO: 87.4 FL — SIGNIFICANT CHANGE UP (ref 80–100)
PHOSPHATE SERPL-MCNC: 4.2 MG/DL — SIGNIFICANT CHANGE UP (ref 2.5–4.5)
PLATELET # BLD AUTO: 259 K/UL — SIGNIFICANT CHANGE UP (ref 150–400)
POTASSIUM SERPL-MCNC: 4.1 MMOL/L — SIGNIFICANT CHANGE UP (ref 3.5–5.3)
POTASSIUM SERPL-SCNC: 4.1 MMOL/L — SIGNIFICANT CHANGE UP (ref 3.5–5.3)
RBC # BLD: 3.97 M/UL — SIGNIFICANT CHANGE UP (ref 3.8–5.2)
RBC # FLD: 12.2 % — SIGNIFICANT CHANGE UP (ref 10.3–14.5)
SODIUM SERPL-SCNC: 138 MMOL/L — SIGNIFICANT CHANGE UP (ref 135–145)
WBC # BLD: 13.8 K/UL — HIGH (ref 3.8–10.5)
WBC # FLD AUTO: 13.8 K/UL — HIGH (ref 3.8–10.5)

## 2018-08-04 PROCEDURE — 74176 CT ABD & PELVIS W/O CONTRAST: CPT | Mod: 26

## 2018-08-04 RX ORDER — FUROSEMIDE 40 MG
40 TABLET ORAL ONCE
Qty: 0 | Refills: 0 | Status: COMPLETED | OUTPATIENT
Start: 2018-08-04 | End: 2018-08-04

## 2018-08-04 RX ORDER — CALAMINE AND ZINC OXIDE AND PHENOL 160; 10 MG/ML; MG/ML
1 LOTION TOPICAL THREE TIMES A DAY
Qty: 0 | Refills: 0 | Status: DISCONTINUED | OUTPATIENT
Start: 2018-08-04 | End: 2018-08-06

## 2018-08-04 RX ORDER — DIPHENHYDRAMINE HCL 50 MG
25 CAPSULE ORAL EVERY 6 HOURS
Qty: 0 | Refills: 0 | Status: DISCONTINUED | OUTPATIENT
Start: 2018-08-04 | End: 2018-08-06

## 2018-08-04 RX ADMIN — Medication 3 MILLILITER(S): at 08:19

## 2018-08-04 RX ADMIN — Medication 40 MILLIGRAM(S): at 11:54

## 2018-08-04 RX ADMIN — Medication 25 MILLIGRAM(S): at 06:22

## 2018-08-04 RX ADMIN — APIXABAN 10 MILLIGRAM(S): 2.5 TABLET, FILM COATED ORAL at 18:21

## 2018-08-04 RX ADMIN — SEVELAMER CARBONATE 800 MILLIGRAM(S): 2400 POWDER, FOR SUSPENSION ORAL at 21:52

## 2018-08-04 RX ADMIN — Medication 25 MILLIGRAM(S): at 21:51

## 2018-08-04 RX ADMIN — Medication 1 TABLET(S): at 18:21

## 2018-08-04 RX ADMIN — SEVELAMER CARBONATE 800 MILLIGRAM(S): 2400 POWDER, FOR SUSPENSION ORAL at 06:21

## 2018-08-04 RX ADMIN — CEFTRIAXONE 100 GRAM(S): 500 INJECTION, POWDER, FOR SOLUTION INTRAMUSCULAR; INTRAVENOUS at 11:54

## 2018-08-04 RX ADMIN — SEVELAMER CARBONATE 800 MILLIGRAM(S): 2400 POWDER, FOR SUSPENSION ORAL at 13:33

## 2018-08-04 RX ADMIN — APIXABAN 10 MILLIGRAM(S): 2.5 TABLET, FILM COATED ORAL at 06:22

## 2018-08-04 RX ADMIN — Medication 1 TABLET(S): at 11:54

## 2018-08-04 RX ADMIN — Medication 1 TABLET(S): at 08:33

## 2018-08-04 RX ADMIN — CALAMINE AND ZINC OXIDE AND PHENOL 1 APPLICATION(S): 160; 10 LOTION TOPICAL at 21:50

## 2018-08-04 RX ADMIN — Medication 3 MILLILITER(S): at 14:43

## 2018-08-04 RX ADMIN — Medication 3 MILLILITER(S): at 20:12

## 2018-08-04 RX ADMIN — Medication 25 MILLIGRAM(S): at 18:21

## 2018-08-04 NOTE — PROGRESS NOTE ADULT - PROBLEM SELECTOR PLAN 2
-Peroneal DVT  -Heparin Drip Held today  -c/w eliquis therapy  -Loading dose 10mg BID x 14 doses -Peroneal DVT  -c/w eliquis therapy  -Loading dose 10mg BID x 14 doses

## 2018-08-04 NOTE — PROGRESS NOTE ADULT - SUBJECTIVE AND OBJECTIVE BOX
Problem List:    PAUL due to obstructive uropathy  Nephrolithiasis  DVT RLE on Apixaban.        PAST MEDICAL & SURGICAL HISTORY:  Kidney stones  HTN (hypertension)  No significant past surgical history      penicillin (Rash)      MEDICATIONS  (STANDING):  ALBUTerol    90 MICROgram(s) HFA Inhaler 1 Puff(s) Inhalation every 4 hours  ALBUTerol/ipratropium for Nebulization 3 milliLiter(s) Nebulizer every 6 hours  apixaban 10 milliGRAM(s) Oral every 12 hours  cefTRIAXone   IVPB      cefTRIAXone   IVPB 1 Gram(s) IV Intermittent every 24 hours  dextrose 5%. 1000 milliLiter(s) (50 mL/Hr) IV Continuous <Continuous>  dextrose 50% Injectable 12.5 Gram(s) IV Push once  dextrose 50% Injectable 25 Gram(s) IV Push once  dextrose 50% Injectable 25 Gram(s) IV Push once  insulin lispro (HumaLOG) corrective regimen sliding scale   SubCutaneous three times a day before meals  lactobacillus acidophilus 1 Tablet(s) Oral three times a day with meals  metoprolol tartrate 25 milliGRAM(s) Oral two times a day  sevelamer hydrochloride 800 milliGRAM(s) Oral three times a day  sodium chloride 0.9%. 1000 milliLiter(s) (75 mL/Hr) IV Continuous <Continuous>  tiotropium 18 MICROgram(s) Capsule 1 Capsule(s) Inhalation daily    MEDICATIONS  (PRN):  dextrose 40% Gel 15 Gram(s) Oral once PRN Blood Glucose LESS THAN 70 milliGRAM(s)/deciliter  glucagon  Injectable 1 milliGRAM(s) IntraMuscular once PRN Glucose LESS THAN 70 milligrams/deciliter  oxyCODONE    5 mG/acetaminophen 325 mG 1 Tablet(s) Oral every 6 hours PRN Severe Pain (7 - 10)  sodium chloride 0.65% Nasal 1 Spray(s) Both Nostrils three times a day PRN Nasal Congestion                            11.6   13.8  )-----------( 259      ( 04 Aug 2018 07:48 )             34.7     08-04    138  |  105  |  49<H>  ----------------------------<  106<H>  4.1   |  23  |  2.29<H>    Ca    9.1      04 Aug 2018 07:48  Phos  4.2     08-04  Mg     1.8     08-04      PT/INR - ( 03 Aug 2018 08:00 )   PT: 16.8 sec;   INR: 1.53 ratio         PTT - ( 03 Aug 2018 08:00 )  PTT:34.0 sec        REVIEW OF SYSTEMS:  General: no fever no chills, no weight loss.  EYES/ENT: No visual changes;  No vertigo, no headache.  NECK: No pain or stiffness  RESPIRATORY: No cough, wheezing, hemoptysis; No shortness of breath  CARDIOVASCULAR: No chest pain or palpitations. No Edema  GASTROINTESTINAL: No abdominal or epigastric pain. No nausea, vomiting. No diarrhea or constipation. No melena.  GENITOURINARY: No dysuria, frequency, foamy urine, urinary urgency, incontinence or hematuria  NEUROLOGICAL: No numbness or weakness, no tremor , no dizziness.   Muscle skeletal : no joint pain and no swelling of joints and limbs.  SKIN: No itching, burning, rashes.        VITALS:  T(F): 98.7 (08-04-18 @ 05:59), Max: 98.8 (08-03-18 @ 20:11)  HR: 80 (08-04-18 @ 05:59)  BP: 128/59 (08-04-18 @ 05:59)  RR: 16 (08-04-18 @ 05:59)  SpO2: 98% (08-04-18 @ 05:59)  Wt(kg): --      PHYSICAL EXAM:  Constitutional: well developed, no diaphoresis, no distress.  Neck: No JVD, no carotid bruit, supple, no adenopathy  Respiratory: Good air entrance B/L, no wheezes, rales or rhonchi  Cardiovascular: S1, S2, RRR, no pericardial rub, no murmur  Abdomen: BS+, soft, no tenderness, no bruit  Pelvis: bladder nondistended  Extremities: No cyanosis or clubbing. No peripheral edema.   Pulses: All present  Neurological: A/O x 3, no focal deficits  Psychiatric: Normal mood, normal affect  Skin: No rashes  Vascular Access: Problem List:    PAUL due to obstructive uropathy  Nephrolithiasis  DVT RLE on Apixaban.    Base line creatinine un known    Left Atrophic kidney          PAST MEDICAL & SURGICAL HISTORY:  Kidney stones  HTN (hypertension)  Venous stasis   ?COPD      penicillin (Rash)      MEDICATIONS  (STANDING):  ALBUTerol    90 MICROgram(s) HFA Inhaler 1 Puff(s) Inhalation every 4 hours  ALBUTerol/ipratropium for Nebulization 3 milliLiter(s) Nebulizer every 6 hours  apixaban 10 milliGRAM(s) Oral every 12 hours  cefTRIAXone   IVPB      cefTRIAXone   IVPB 1 Gram(s) IV Intermittent every 24 hours  dextrose 5%. 1000 milliLiter(s) (50 mL/Hr) IV Continuous <Continuous>  dextrose 50% Injectable 12.5 Gram(s) IV Push once  dextrose 50% Injectable 25 Gram(s) IV Push once  dextrose 50% Injectable 25 Gram(s) IV Push once  insulin lispro (HumaLOG) corrective regimen sliding scale   SubCutaneous three times a day before meals  lactobacillus acidophilus 1 Tablet(s) Oral three times a day with meals  metoprolol tartrate 25 milliGRAM(s) Oral two times a day  sevelamer hydrochloride 800 milliGRAM(s) Oral three times a day  sodium chloride 0.9%. 1000 milliLiter(s) (75 mL/Hr) IV Continuous <Continuous>  tiotropium 18 MICROgram(s) Capsule 1 Capsule(s) Inhalation daily    MEDICATIONS  (PRN):  dextrose 40% Gel 15 Gram(s) Oral once PRN Blood Glucose LESS THAN 70 milliGRAM(s)/deciliter  glucagon  Injectable 1 milliGRAM(s) IntraMuscular once PRN Glucose LESS THAN 70 milligrams/deciliter  oxyCODONE    5 mG/acetaminophen 325 mG 1 Tablet(s) Oral every 6 hours PRN Severe Pain (7 - 10)  sodium chloride 0.65% Nasal 1 Spray(s) Both Nostrils three times a day PRN Nasal Congestion                            11.6   13.8  )-----------( 259      ( 04 Aug 2018 07:48 )             34.7     08-04    138  |  105  |  49<H>  ----------------------------<  106<H>  4.1   |  23  |  2.29<H>    Ca    9.1      04 Aug 2018 07:48  Phos  4.2     08-04  Mg     1.8     08-04      PT/INR - ( 03 Aug 2018 08:00 )   PT: 16.8 sec;   INR: 1.53 ratio         PTT - ( 03 Aug 2018 08:00 )  PTT:34.0 sec    Urinalysis + Microscopic Examination (08.03.18 @ 21:28)    Glucose Qualitative, Urine: Negative    Blood, Urine: Large    Urine Appearance: bloody    Bilirubin: Negative    Color: Red    Urobilinogen: Negative    Specific Gravity: 1.020    Protein, Urine: 500 mg/dL    pH Urine: 5.0    Leukocyte Esterase Concentration: Small    Nitrite: Positive    Ketone - Urine: Negative    Red Blood Cell - Urine: >50: tntc /HPF    White Blood Cell - Urine: 3-5 /HPF    Epithelial Cells: Few /HPF    Bacteria: Few /HPF    Comment - Urine: moderate amorphous urates, present    Protein, Urine: 30 mg/dL (07.28.18 @ 21:19)        REVIEW OF SYSTEMS:  General: no fever no chills, no weight loss.  EYES/ENT: No visual changes;  No vertigo, no headache.  NECK: No pain or stiffness  RESPIRATORY: No cough, wheezing, hemoptysis; No shortness of breath  CARDIOVASCULAR: No chest pain or palpitations. No Edema  GASTROINTESTINAL: No abdominal or epigastric pain. No nausea, vomiting. No diarrhea or constipation. No melena.  GENITOURINARY: SAID THAT TODAY HER URINE IS CLEAR, YESTERDAY WAS WITH BLOOD.  NEUROLOGICAL: No numbness or weakness, no tremor , no dizziness.   Muscle skeletal : no joint pain and no swelling of joints and limbs.  SKIN: No itching, burning, rashes.        VITALS:  T(F): 98.7 (08-04-18 @ 05:59), Max: 98.8 (08-03-18 @ 20:11)  HR: 80 (08-04-18 @ 05:59)  BP: 128/59 (08-04-18 @ 05:59)  RR: 16 (08-04-18 @ 05:59)  SpO2: 98% (08-04-18 @ 05:59)  Wt(kg): --      PHYSICAL EXAM:pROLONGED EXPIRATORYPHASE  Constitutional: well developed, no diaphoresis, no distress.  Neck: No JVD, no carotid bruit, supple, no adenopathy  Respiratory: air entrance B/L, no wheezes, rales AT LEFT BASE  Cardiovascular: S1, S2, RRR, no pericardial rub, no murmur  Abdomen: BS+, soft, no tenderness, no bruit  Pelvis: bladder nondistended  Extremities: No cyanosis or clubbing. LLE edema flus 1

## 2018-08-04 NOTE — CONSULT NOTE ADULT - RS GEN PE MLT RESP DETAILS PC
airway patent/respirations non-labored/good air movement/no rhonchi/breath sounds equal/clear to auscultation bilaterally/no rales few scattered/airway patent/good air movement/clear to auscultation bilaterally/breath sounds equal/respirations non-labored/rhonchi

## 2018-08-04 NOTE — CHART NOTE - NSCHARTNOTEFT_GEN_A_CORE
Was paged for pt that pt has a rash on the back . I assessed the pt, gave her calamine lotion and benadryl PO . Primary team please Follow up

## 2018-08-04 NOTE — PROGRESS NOTE ADULT - ASSESSMENT
Patient admitted for concerns of acute renal failure with post-obstructive component, requiring urological stent placement and further monitoring of urine output and renal functions.    2 episodes of watery diarrhea Friday with elevated WBC count. no further episodes of diarrhea today Patient admitted for concerns of acute renal failure with post-obstructive component, requiring urological stent placement and further monitoring of urine output and renal functions.    2 episodes of watery diarrhea Friday with elevated WBC count. no further episodes of diarrhea today  ***Monday team to call Vascular Dr Jones for thoughts of AC for peroneal DVT

## 2018-08-04 NOTE — PROGRESS NOTE ADULT - SUBJECTIVE AND OBJECTIVE BOX
PGY 2 Note discussed with primary attending    Patient is a 78y old  Female who presents with a chief complaint of obstructing renal stone (2018 12:28)      INTERVAL HPI/OVERNIGHT EVENTS: Patient seen and examined at bedside with no new complaints - BUN/Cr mild worsening, no back pain/urinary complaints except frequency, no further diarrhea, UA noted for hematuria similar to admission UA, H&H stable, WBC likely 2/2 pyelo    MEDICATIONS  (STANDING):  ALBUTerol    90 MICROgram(s) HFA Inhaler 1 Puff(s) Inhalation every 4 hours  ALBUTerol/ipratropium for Nebulization 3 milliLiter(s) Nebulizer every 6 hours  apixaban 10 milliGRAM(s) Oral every 12 hours  cefTRIAXone   IVPB      cefTRIAXone   IVPB 1 Gram(s) IV Intermittent every 24 hours  dextrose 5%. 1000 milliLiter(s) (50 mL/Hr) IV Continuous <Continuous>  dextrose 50% Injectable 12.5 Gram(s) IV Push once  dextrose 50% Injectable 25 Gram(s) IV Push once  dextrose 50% Injectable 25 Gram(s) IV Push once  furosemide   Injectable 40 milliGRAM(s) IV Push once  insulin lispro (HumaLOG) corrective regimen sliding scale   SubCutaneous three times a day before meals  lactobacillus acidophilus 1 Tablet(s) Oral three times a day with meals  metoprolol tartrate 25 milliGRAM(s) Oral two times a day  sevelamer hydrochloride 800 milliGRAM(s) Oral three times a day  sodium chloride 0.9%. 1000 milliLiter(s) (75 mL/Hr) IV Continuous <Continuous>  tiotropium 18 MICROgram(s) Capsule 1 Capsule(s) Inhalation daily    MEDICATIONS  (PRN):  dextrose 40% Gel 15 Gram(s) Oral once PRN Blood Glucose LESS THAN 70 milliGRAM(s)/deciliter  glucagon  Injectable 1 milliGRAM(s) IntraMuscular once PRN Glucose LESS THAN 70 milligrams/deciliter  oxyCODONE    5 mG/acetaminophen 325 mG 1 Tablet(s) Oral every 6 hours PRN Severe Pain (7 - 10)  sodium chloride 0.65% Nasal 1 Spray(s) Both Nostrils three times a day PRN Nasal Congestion      __________________________________________________  REVIEW OF SYSTEMS:  RESPIRATORY: No cough; No shortness of breath  CARDIOVASCULAR: No chest pain, no palpitations  GASTROINTESTINAL: No pain. No nausea or vomiting; No diarrhea       Vital Signs Last 24 Hrs  T(C): 37.1 (04 Aug 2018 05:59), Max: 37.1 (03 Aug 2018 20:11)  T(F): 98.7 (04 Aug 2018 05:59), Max: 98.8 (03 Aug 2018 20:11)  HR: 80 (04 Aug 2018 05:59) (77 - 83)  BP: 128/59 (04 Aug 2018 05:59) (128/59 - 136/77)  BP(mean): --  RR: 16 (04 Aug 2018 05:59) (16 - 17)  SpO2: 98% (04 Aug 2018 05:59) (96% - 98%)    ________________________________________________  PHYSICAL EXAM:  GENERAL: NAD  HEENT: Normocephalic;  conjunctivae and sclerae clear; moist mucous membranes;   NECK : supple  CHEST/LUNG: Clear to auscultation bilaterally with good air entry   HEART: S1 S2  regular; no murmurs, gallops or rubs  ABDOMEN: Soft, Nontender, Nondistended; Bowel sounds present  EXTREMITIES: No cyanosis; no edema; no calf tenderness  NERVOUS SYSTEM:  Awake and alert; Oriented to place, person and time ; no new deficits    _________________________________________________  LABS:                        11.6   13.8  )-----------( 259      ( 04 Aug 2018 07:48 )             34.7     08-04    138  |  105  |  49<H>  ----------------------------<  106<H>  4.1   |  23  |  2.29<H>    Ca    9.1      04 Aug 2018 07:48  Phos  4.2     08-04  Mg     1.8     08-04      PT/INR - ( 03 Aug 2018 08:00 )   PT: 16.8 sec;   INR: 1.53 ratio         PTT - ( 03 Aug 2018 08:00 )  PTT:34.0 sec  Urinalysis Basic - ( 03 Aug 2018 21:28 )    Color: Red / Appearance: bloody / S.020 / pH: x  Gluc: x / Ketone: Negative  / Bili: Negative / Urobili: Negative   Blood: x / Protein: 500 mg/dL / Nitrite: Positive   Leuk Esterase: Small / RBC: >50 /HPF / WBC 3-5 /HPF   Sq Epi: x / Non Sq Epi: Few /HPF / Bacteria: Few /HPF      CAPILLARY BLOOD GLUCOSE      POCT Blood Glucose.: 117 mg/dL (04 Aug 2018 07:39)  POCT Blood Glucose.: 133 mg/dL (03 Aug 2018 21:00)  POCT Blood Glucose.: 166 mg/dL (03 Aug 2018 16:46)  POCT Blood Glucose.: 117 mg/dL (03 Aug 2018 12:19)        RADIOLOGY & ADDITIONAL TESTS:    Imaging Personally Reviewed:  YES    Consultant(s) Notes Reviewed:   YES    Care Discussed with Consultants : YES    Plan of care was discussed with patient and /or primary care giver; all questions and concerns were addressed and care was aligned with patient's wishes.

## 2018-08-04 NOTE — PROGRESS NOTE ADULT - ASSESSMENT
CKD - nephrolithisis and left atrophic kidney  EGFR 20 ml/minute at this time    DVT bellow knee on anticoagulation.  Hematuria which is not clear if its from the anti coagulation.  Suggest to follow CT of abdomen and pelvis, stent placement  Follow H/H daily.    Lasix one time due to increased edema.

## 2018-08-04 NOTE — CONSULT NOTE ADULT - SUBJECTIVE AND OBJECTIVE BOX
HPI:  77 y/o F pt with a PMHx of HTN, Arthritis, Right breast cancer s/p lumpectomy, bilateral cataracts extraction, presents to the ED for further workup of acute renal failure.  Pt reports that she had a CT done last Wednesday for evaluation of kidney stones as outpatient with urologist Dr. Caldwell and received a call back yesterday informing her that she had obstructing stones, and advised to come to the ED for further management. During workup in the ED, patient found to have elevated creatinine of 14.4. Patient does state that she has had right-sided flank pain, cramping in nature, however has subsided for the last few days. Pt is making small amount of urine daily, has not voided today yet; patient states that her son had a similar issue in the past which required stents. Pt denies fever, chills, nausea, vomiting, or diarrhea. Patient states she generally has lower extremity swelling, however noticed that it has gotten worse lately. Patient also notes she was slightly short of breath when coming into the ED, however feels much better after nebulizer treatment. (2018 12:28)    18  ID consult was called due to pt's elevated white count. Pt was admitted with acute renal failure secondary to presence of bilateral obstructive nephrolithiasis. Pt is s/p emergent cystoscopy with bilateral urethral stents placement on 18 by Dr. Caldwell. Pt's cr has improved since the procedure however still not within normal range, today 2.29. Pt complains of dysuria, urgency and frequency upon urination, stress urinary incontinence, denies having bilateral flank pain. UA is positive for few bacteria, nitrates, WBC 3-5, + hematuria. UCx is pending result, UC collected on 18  with no growth, pt is on Rocephin 1 gram IV q 24 hours. Pt reports having a couple episodes of diarrhea yesterday, none today.     PAST MEDICAL & SURGICAL HISTORY:  Kidney stones  HTN (hypertension)  Right breast cancer, s/p lumpectomy  Arthritis, s/p right total knee replacement  Bilateral cataracts extraction       ALLERGIES: penicillin (Rash)      MEDS:  ALBUTerol    90 MICROgram(s) HFA Inhaler 1 Puff(s) Inhalation every 4 hours  ALBUTerol/ipratropium for Nebulization 3 milliLiter(s) Nebulizer every 6 hours  apixaban 10 milliGRAM(s) Oral every 12 hours  cefTRIAXone   IVPB      cefTRIAXone   IVPB 1 Gram(s) IV Intermittent every 24 hours  dextrose 40% Gel 15 Gram(s) Oral once PRN  dextrose 5%. 1000 milliLiter(s) IV Continuous <Continuous>  dextrose 50% Injectable 12.5 Gram(s) IV Push once  dextrose 50% Injectable 25 Gram(s) IV Push once  dextrose 50% Injectable 25 Gram(s) IV Push once  glucagon  Injectable 1 milliGRAM(s) IntraMuscular once PRN  insulin lispro (HumaLOG) corrective regimen sliding scale   SubCutaneous three times a day before meals  lactobacillus acidophilus 1 Tablet(s) Oral three times a day with meals  metoprolol tartrate 25 milliGRAM(s) Oral two times a day  oxyCODONE    5 mG/acetaminophen 325 mG 1 Tablet(s) Oral every 6 hours PRN  sevelamer hydrochloride 800 milliGRAM(s) Oral three times a day  sodium chloride 0.65% Nasal 1 Spray(s) Both Nostrils three times a day PRN  sodium chloride 0.9%. 1000 milliLiter(s) IV Continuous <Continuous>  tiotropium 18 MICROgram(s) Capsule 1 Capsule(s) Inhalation daily      FAMILY HISTORY:  Family history of kidney stones (Child)      VITALS:  Vital Signs Last 24 Hrs  T(C): 37.1 (04 Aug 2018 05:59), Max: 37.1 (03 Aug 2018 20:11)  T(F): 98.7 (04 Aug 2018 05:59), Max: 98.8 (03 Aug 2018 20:11)  HR: 73 (04 Aug 2018 11:51) (73 - 83)  BP: 112/69 (04 Aug 2018 11:51) (112/69 - 136/77)  BP(mean): --  RR: 16 (04 Aug 2018 05:59) (16 - 17)  SpO2: 98% (04 Aug 2018 05:59) (96% - 98%)    LABS/DIAGNOSTIC TESTS:                        11.6   13.8  )-----------( 259      ( 04 Aug 2018 07:48 )             34.7     WBC Count: 13.8 K/uL ( @ 07:48)  WBC Count: 15.7 K/uL ( @ 08:00)  WBC Count: 12.1 K/uL ( @ 06:32)        138  |  105  |  49<H>  ----------------------------<  106<H>  4.1   |  23  |  2.29<H>    Ca    9.1      04 Aug 2018 07:48  Phos  4.2       Mg     1.8             Urinalysis Basic - ( 03 Aug 2018 21:28 )    Color: Red / Appearance: bloody / S.020 / pH: x  Gluc: x / Ketone: Negative  / Bili: Negative / Urobili: Negative   Blood: x / Protein: 500 mg/dL / Nitrite: Positive   Leuk Esterase: Small / RBC: >50 /HPF / WBC 3-5 /HPF   Sq Epi: x / Non Sq Epi: Few /HPF / Bacteria: Few /HPF      PT/INR - ( 03 Aug 2018 08:00 )   PT: 16.8 sec;   INR: 1.53 ratio         PTT - ( 03 Aug 2018 08:00 )  PTT:34.0 sec    Hemoglobin A1C, Whole Blood in AM (18 @ 11:47)    Hemoglobin A1C, Whole Blood: 6.1: Method: Immunoassay       Reference Range                4.0-5.6%       High risk (prediabetic)        5.7-6.4%       Diabetic, diagnostic             >=6.5%       ADA diabetic treatment goal       <7.0%  The Hemoglobin A1c testing is NGSP-certified.Reference ranges are based  upon the 2010 recommendations of  the American Diabetes Association.  Interpretation may vary for children  and adolescents. %      CULTURES:   .Urine Bladder (from O.R.)   @ 00:34   No growth at 48 hours  --  --      .Urine Catheterized   @ 00:15   No growth  --  --          RADIOLOGY:    < from: Xray Chest 1 View- PORTABLE-Urgent (18 @ 11:11) >  EXAM:  XR CHEST PORTABLE URGENT 1V                            PROCEDURE DATE:  2018          INTERPRETATION:  Chest one view    HISTORY: Cough and wheezing     COMPARISON STUDY: 2018    Frontal expiratory view of the chest shows the heart to be similarly   enlarged in size. The lungs show questionable left perihilar infiltrate   and there is no evidence of pneumothorax nor pleural effusion.    IMPRESSION:  Questionable left infiltrate. Follow-up study is recommended as   clinically warranted.    Thank you for the courtesy of this referral.    JOSE GUADALUPE WILSON M.D., ATTENDING RADIOLOGIST  This document has been electronically signed. 2018 11:32AM    < end of copied text >    < from: Xray Chest 2 Views PA/Lat (18 @ 12:35) >  EXAM:  XR CHEST PA LAT 2V                            PROCEDURE DATE:  2018          INTERPRETATION:  Chest radiographs     CLINICAL INFORMATION:      TECHNIQUE:  Frontal and lateral views of the chest were obtained.    FINDINGS:  No previous examinations are available for review.    The lungs are free of infiltrate.  No pleural abnormality is seen. The   heart appears normal in size. There is a prominent aortic no abdominal   and with appears to be tortuosity of the descending thoracic aorta. A   superimposed aneurysm or mass in the distal mediastinum is not excluded.   No destructive lesion is identified in the visualized skeleton.      IMPRESSION: No infiltrate. Prominent aortic arch and tortuous descending   thoracic aorta. Aortic aneurysm or distal mediastinal mass is not   excluded. CT scan of the chest is recommended.      BLANCA CELAYA M.D. ATTENDING RADIOLOGIST  This document has been electronically signed. 2018  2:28PM    < end of copied text >    < from: US Duplex Venous Lower Ext Complete, Bilateral (18 @ 14:03) >  EXAM:  US DPLX LWR EXT VEINS COMPL BI                            PROCEDURE DATE:  2018          INTERPRETATION:  EXAM: US DPLX LWR EXT VEINS COMPL BI   INDICATION: bialteral leg edema.  COMPARISON: None.    TECHNIQUE: Multiple static images from duplex sonography of the deep   veins of the bilateral lower extremities utilizing color and spectral   Doppler interrogation, without and with compression.    FINDINGS:  There is DVT in the right peroneal vein.    The bilateral common femoral, superficial femoral, popliteal and   visualized left calf veins are normally compressible and demonstrate   normal spontaneous and phasic flow and/or augmentation. There is no   evidence of deep vein thrombosis.    IMPRESSION:  Right peroneal DVT.    Dr. Rm was informed at the conclusion of the study.    CONCHA JACK M.D., ATTENDING RADIOLOGIST  This document has been electronically signed. 2018  2:04PM    < end of copied text >    < from: CT Renal Stone Hunt (18 @ 16:14) >  EXAM:  CT RENAL STONE CORTES        PROCEDURE DATE:  2018           INTERPRETATION:  CLINICAL INFORMATION: Bilateral kidney stones.    COMPARISON: None currently available.    PROCEDURE:   CT of the Abdomen and Pelvis was performed without intravenous contrast.   Intravenous contrast: None.  Oral contrast: None.  Sagittal and coronal reformats were performed.    FINDINGS:    LOWER CHEST: Within normal limits.    LIVER: Within normal limits.  BILE DUCTS: Normal caliber.  GALLBLADDER: Cholelithiasis.  SPLEEN: Within normal limits.  PANCREAS: Within normal limits.  ADRENALS: Within normal limits.  KIDNEYS/URETERS:     Right kidney/ureter: Mild to moderate right hydronephrosis secondary to   an 8 mm obstructing stone at the right UPJ. An additional 9 mm   nonobstructing right lower pole renal calculus.    Left kidney/ureter: Markedly atrophic left kidney with a 1.4 cm stone in   the left renal pelvis at the UPJ. Additional smaller left intrarenal   calculi. No left hydronephrosis.    BLADDER: A 3.1 x 2.1 cm bladder calculus.  REPRODUCTIVE ORGANS: A 5.5 cm pedunculated calcified melanoma at the left   lower uterine segment. No adnexal mass.    BOWEL: No bowel obstruction. Appendix is normal.  PERITONEUM: No ascites.  VESSELS:  Atherosclerotic changes.  RETROPERITONEUM: No lymphadenopathy.    ABDOMINAL WALL: Within normal limits.  BONES: Degenerative changes of the spine.    IMPRESSION:     Mild to moderate right hydronephrosis secondary to an 8 mm obstructing   stone at the rightUPJ. Additional nonobstructing right lower pole renal   calculus.    Markedly atrophic left kidney as above with a 1.4 cm stone in the left   renal pelvis.    Large bladder calculus.    These findings were discussed with Nurse Nance at Dr. Caldwell's office    2018 9:21 AM by Dr. Andrews with read back confirmation.      ARABELLA ANDREWS M.D., ATTENDING RADIOLOGIST  This document has been electronically signed. 2018  9:30AM    < end of copied text >

## 2018-08-04 NOTE — PROGRESS NOTE ADULT - PROBLEM SELECTOR PLAN 6
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   -GYN consult for follow up

## 2018-08-04 NOTE — CONSULT NOTE ADULT - ASSESSMENT
1. Leukocytosis  2. possible UTI    Plan:  Continue Rocephin 1 gram IV q 24 hours - pt's PCN allergy is unlikely, received two doses of Rocephin with no reaction  Awaiting for repeat urine culture, sent yesterday.

## 2018-08-05 LAB
ANION GAP SERPL CALC-SCNC: 10 MMOL/L — SIGNIFICANT CHANGE UP (ref 5–17)
BUN SERPL-MCNC: 55 MG/DL — HIGH (ref 7–18)
CALCIUM SERPL-MCNC: 9.6 MG/DL — SIGNIFICANT CHANGE UP (ref 8.4–10.5)
CHLORIDE SERPL-SCNC: 104 MMOL/L — SIGNIFICANT CHANGE UP (ref 96–108)
CO2 SERPL-SCNC: 24 MMOL/L — SIGNIFICANT CHANGE UP (ref 22–31)
CREAT SERPL-MCNC: 2.29 MG/DL — HIGH (ref 0.5–1.3)
CULTURE RESULTS: NO GROWTH — SIGNIFICANT CHANGE UP
GLUCOSE BLDC GLUCOMTR-MCNC: 116 MG/DL — HIGH (ref 70–99)
GLUCOSE BLDC GLUCOMTR-MCNC: 131 MG/DL — HIGH (ref 70–99)
GLUCOSE BLDC GLUCOMTR-MCNC: 134 MG/DL — HIGH (ref 70–99)
GLUCOSE BLDC GLUCOMTR-MCNC: 155 MG/DL — HIGH (ref 70–99)
GLUCOSE SERPL-MCNC: 111 MG/DL — HIGH (ref 70–99)
HCT VFR BLD CALC: 37.1 % — SIGNIFICANT CHANGE UP (ref 34.5–45)
HGB BLD-MCNC: 12.1 G/DL — SIGNIFICANT CHANGE UP (ref 11.5–15.5)
MAGNESIUM SERPL-MCNC: 1.7 MG/DL — SIGNIFICANT CHANGE UP (ref 1.6–2.6)
MCHC RBC-ENTMCNC: 28.6 PG — SIGNIFICANT CHANGE UP (ref 27–34)
MCHC RBC-ENTMCNC: 32.7 GM/DL — SIGNIFICANT CHANGE UP (ref 32–36)
MCV RBC AUTO: 87.6 FL — SIGNIFICANT CHANGE UP (ref 80–100)
PHOSPHATE SERPL-MCNC: 5 MG/DL — HIGH (ref 2.5–4.5)
PLATELET # BLD AUTO: 270 K/UL — SIGNIFICANT CHANGE UP (ref 150–400)
POTASSIUM SERPL-MCNC: 3.9 MMOL/L — SIGNIFICANT CHANGE UP (ref 3.5–5.3)
POTASSIUM SERPL-SCNC: 3.9 MMOL/L — SIGNIFICANT CHANGE UP (ref 3.5–5.3)
RBC # BLD: 4.23 M/UL — SIGNIFICANT CHANGE UP (ref 3.8–5.2)
RBC # FLD: 12.4 % — SIGNIFICANT CHANGE UP (ref 10.3–14.5)
SODIUM SERPL-SCNC: 138 MMOL/L — SIGNIFICANT CHANGE UP (ref 135–145)
SPECIMEN SOURCE: SIGNIFICANT CHANGE UP
WBC # BLD: 15 K/UL — HIGH (ref 3.8–10.5)
WBC # FLD AUTO: 15 K/UL — HIGH (ref 3.8–10.5)

## 2018-08-05 RX ORDER — TIOTROPIUM BROMIDE 18 UG/1
1 CAPSULE ORAL; RESPIRATORY (INHALATION) DAILY
Qty: 0 | Refills: 0 | Status: DISCONTINUED | OUTPATIENT
Start: 2018-08-05 | End: 2018-08-06

## 2018-08-05 RX ORDER — ALBUTEROL 90 UG/1
1 AEROSOL, METERED ORAL EVERY 4 HOURS
Qty: 0 | Refills: 0 | Status: DISCONTINUED | OUTPATIENT
Start: 2018-08-05 | End: 2018-08-06

## 2018-08-05 RX ADMIN — SEVELAMER CARBONATE 800 MILLIGRAM(S): 2400 POWDER, FOR SUSPENSION ORAL at 21:37

## 2018-08-05 RX ADMIN — Medication 1 TABLET(S): at 08:11

## 2018-08-05 RX ADMIN — Medication 1 TABLET(S): at 12:27

## 2018-08-05 RX ADMIN — CALAMINE AND ZINC OXIDE AND PHENOL 1 APPLICATION(S): 160; 10 LOTION TOPICAL at 14:39

## 2018-08-05 RX ADMIN — APIXABAN 10 MILLIGRAM(S): 2.5 TABLET, FILM COATED ORAL at 17:37

## 2018-08-05 RX ADMIN — CALAMINE AND ZINC OXIDE AND PHENOL 1 APPLICATION(S): 160; 10 LOTION TOPICAL at 05:38

## 2018-08-05 RX ADMIN — APIXABAN 10 MILLIGRAM(S): 2.5 TABLET, FILM COATED ORAL at 05:37

## 2018-08-05 RX ADMIN — Medication 3 MILLILITER(S): at 20:54

## 2018-08-05 RX ADMIN — CEFTRIAXONE 100 GRAM(S): 500 INJECTION, POWDER, FOR SOLUTION INTRAMUSCULAR; INTRAVENOUS at 12:27

## 2018-08-05 RX ADMIN — SEVELAMER CARBONATE 800 MILLIGRAM(S): 2400 POWDER, FOR SUSPENSION ORAL at 05:37

## 2018-08-05 RX ADMIN — SEVELAMER CARBONATE 800 MILLIGRAM(S): 2400 POWDER, FOR SUSPENSION ORAL at 14:38

## 2018-08-05 RX ADMIN — CALAMINE AND ZINC OXIDE AND PHENOL 1 APPLICATION(S): 160; 10 LOTION TOPICAL at 21:36

## 2018-08-05 RX ADMIN — Medication 25 MILLIGRAM(S): at 17:37

## 2018-08-05 RX ADMIN — Medication 1 TABLET(S): at 17:37

## 2018-08-05 RX ADMIN — Medication 25 MILLIGRAM(S): at 05:37

## 2018-08-05 NOTE — PROGRESS NOTE ADULT - PROBLEM SELECTOR PLAN 7
[x] Previous VTE                                                3  [] Thrombophilia                                             2  [] Lower limb paralysis                                   2    [] Current Cancer                                             2   [x] Immobilization > 24 hrs                              1  [] ICU/CCU stay > 24 hours                             1  [x] Age > 60                                                         1    IMPROVE VTE Score: 5; on heparin drip for DVT

## 2018-08-05 NOTE — PROGRESS NOTE ADULT - SUBJECTIVE AND OBJECTIVE BOX
CHIEF COMPLAINT:Patient is a 78y old  Female who presents with a chief complaint of obstructing renal stone (28 Jul 2018 12:28)    	  REVIEW OF SYSTEMS:  CONSTITUTIONAL: No fever, weight loss, or fatigue  EYES: No eye pain, visual disturbances, or discharge  ENMT:  No difficulty hearing, tinnitus, vertigo; No sinus or throat pain  NECK: No pain or stiffness  RESPIRATORY: No cough, wheezing, chills or hemoptysis; No Shortness of Breath  CARDIOVASCULAR: No chest pain, palpitations, passing out, dizziness, or leg swelling  GASTROINTESTINAL: No abdominal or epigastric pain. No nausea, vomiting, or hematemesis; No diarrhea or constipation. No melena or hematochezia.  GENITOURINARY: No dysuria, frequency, hematuria, or incontinence  NEUROLOGICAL: No headaches, memory loss, loss of strength, numbness, or tremors  SKIN: No itching, burning, rashes, or lesions   LYMPH Nodes: No enlarged glands  ENDOCRINE: No heat or cold intolerance; No hair loss  MUSCULOSKELETAL: No joint pain or swelling; No muscle, back, or extremity pain  PSYCHIATRIC: No depression, anxiety, mood swings, or difficulty sleeping  HEME/LYMPH: No easy bruising, or bleeding gums  ALLERY AND IMMUNOLOGIC: No hives or eczema	    [ ] All others negative	  [ ] Unable to obtain    PHYSICAL EXAM:  T(C): 36.6 (08-05-18 @ 20:25), Max: 36.8 (08-05-18 @ 04:55)  HR: 72 (08-05-18 @ 20:25) (66 - 75)  BP: 140/91 (08-05-18 @ 20:25) (132/74 - 144/99)  RR: 17 (08-05-18 @ 20:25) (16 - 17)  SpO2: 91% (08-05-18 @ 20:25) (91% - 95%)  Wt(kg): --  I&O's Summary    05 Aug 2018 07:01  -  05 Aug 2018 23:58  --------------------------------------------------------  IN: 300 mL / OUT: 3 mL / NET: 297 mL        Appearance: Normal	  HEENT:   Normal oral mucosa, PERRL, EOMI	  Lymphatic: No lymphadenopathy  Cardiovascular: Normal S1 S2, No JVD, No murmurs, No edema  Respiratory: Lungs clear to auscultation	  Psychiatry: A & O x 3, Mood & affect appropriate  Gastrointestinal:  Soft, Non-tender, + BS	  Skin: No rashes, No ecchymoses, No cyanosis	  Neurologic: Non-focal  Extremities: Normal range of motion, No clubbing, cyanosis or edema  Vascular: Peripheral pulses palpable 2+ bilaterally    MEDICATIONS  (STANDING):  ALBUTerol    90 MICROgram(s) HFA Inhaler 1 Puff(s) Inhalation every 4 hours  apixaban 10 milliGRAM(s) Oral every 12 hours  calamine Lotion 1 Application(s) Topical three times a day  cefTRIAXone   IVPB 1 Gram(s) IV Intermittent every 24 hours  cefTRIAXone   IVPB      dextrose 5%. 1000 milliLiter(s) (50 mL/Hr) IV Continuous <Continuous>  dextrose 50% Injectable 12.5 Gram(s) IV Push once  dextrose 50% Injectable 25 Gram(s) IV Push once  dextrose 50% Injectable 25 Gram(s) IV Push once  insulin lispro (HumaLOG) corrective regimen sliding scale   SubCutaneous three times a day before meals  lactobacillus acidophilus 1 Tablet(s) Oral three times a day with meals  metoprolol tartrate 25 milliGRAM(s) Oral two times a day  sevelamer hydrochloride 800 milliGRAM(s) Oral three times a day  sodium chloride 0.9%. 1000 milliLiter(s) (75 mL/Hr) IV Continuous <Continuous>  tiotropium 18 MICROgram(s) Capsule 1 Capsule(s) Inhalation daily      TELEMETRY: 	    ECG:  	  RADIOLOGY:  OTHER: 	  	  CBC Full  -  ( 05 Aug 2018 07:22 )  WBC Count : 15.0 K/uL  Hemoglobin : 12.1 g/dL  Hematocrit : 37.1 %  Platelet Count - Automated : 270 K/uL  Mean Cell Volume : 87.6 fl  Mean Cell Hemoglobin : 28.6 pg  Mean Cell Hemoglobin Concentration : 32.7 gm/dL  Auto Neutrophil # : x  Auto Lymphocyte # : x  Auto Monocyte # : x  Auto Eosinophil # : x  Auto Basophil # : x  Auto Neutrophil % : x  Auto Lymphocyte % : x  Auto Monocyte % : x  Auto Eosinophil % : x  Auto Basophil % : x        CARDIAC MARKERS:                              12.1   15.0  )-----------( 270      ( 05 Aug 2018 07:22 )             37.1       08-05    138  |  104  |  55<H>  ----------------------------<  111<H>  3.9   |  24  |  2.29<H>    Ca    9.6      05 Aug 2018 07:22  Phos  5.0     08-05  Mg     1.7     08-05              proBNP:   Lipid Profile: Cholesterol 136  LDL 90  HDL 28  TG 91    HgA1c: Hemoglobin A1C, Whole Blood: 6.1 % (07-29 @ 11:47)    TSH: Thyroid Stimulating Hormone, Serum: 0.14 uU/mL (07-29 @ 06:43)

## 2018-08-05 NOTE — PROGRESS NOTE ADULT - ASSESSMENT
1. Leukocytosis  2. possible UTI    Plan:  Continue Rocephin 1 gram IV q 24 hours - pt's PCN allergy is unlikely, no reaction to Rocephin   Awaiting for repeat urine culture.

## 2018-08-05 NOTE — PROGRESS NOTE ADULT - PROBLEM SELECTOR PLAN 1
requiring urological intervention. Done yesterday. Stable . will follow up with urology, and nephrology, Dr. Rm.  f/u BMP, creatinine, urine lytes, urine output monitoring
Patient with bilateral renal stones causing hydronephrosis and requiring urologic intervention. Will follow up with nephrology and urology. Both notes appreciated. Doing well. renal function much improved.
-Patient with bilateral renal stones causing hydronephrosis and requiring urologic intervention.   -Will follow up with nephrology and urology.  -renal function much improved  Urology: Dr. Caldwell  Nephro: Dr. Colón
-Patient with bilateral renal stones causing hydronephrosis and requiring urologic intervention.   -Will follow up with nephrology and urology.  -renal function much improved  Urology: Dr. Caldwell  Nephro: Dr. Colón
-Patient with bilateral renal stones causing hydronephrosis and requiring urologic intervention.   -Will follow up with nephrology and urology.  -renal function much improved  Urology: Dr. Caldwell  Nephro: Dr. Colón; CT abd pending
-Patient with bilateral renal stones causing hydronephrosis and requiring urologic intervention.   -Will follow up with nephrology and urology.  -renal function much improved  Urology: Dr. Caldwell  Nephro: Dr. Colón; Will continue Rocephin as per ID. and follow up.
Patient with bilateral renal stones causing hydronephrosis and requiring urologic intervention. Will follow up with nephrology and urology. Both notes appreciated.
Patient with bilateral renal stones causing hydronephrosis and requiring urologic intervention. Will follow up with nephrology and urology. Both notes appreciated. Doing well. renal function much improved.

## 2018-08-05 NOTE — PROGRESS NOTE ADULT - PROBLEM SELECTOR PROBLEM 1
Acute renal failure

## 2018-08-05 NOTE — PROGRESS NOTE ADULT - SUBJECTIVE AND OBJECTIVE BOX
78y Female in our care for leukocytosis and UTI. No overnight events, pt is in bed and has no new complains. Pt remains afebrile, slight increase in leukocytosis noted, UC pending result. Pt denies dysuria, no flank pain, urgency and frequency upon urination are improving. Pt's cr with no change since yesterday. CT abdomen and pelvis performed yesterday - Bilateral double-J ureteral stents extending from the renal pelves to the urinary bladder. There is a 10 mm stone at the left ureteropelvic junction as well as an 8.5 mm stone in the lower pole of the right collecting system.  Pt denies having shortness of breath, no chest pain, no abdominal pain, no nausea, vomiting or diarrhea.     MEDS:  cefTRIAXone   IVPB 1 Gram(s) IV Intermittent every 24 hours  cefTRIAXone   IVPB        Allergies    penicillin (Rash)    VITALS:  Vital Signs Last 24 Hrs  T(C): 36.8 (05 Aug 2018 04:55), Max: 36.8 (05 Aug 2018 04:55)  T(F): 98.2 (05 Aug 2018 04:55), Max: 98.2 (05 Aug 2018 04:55)  HR: 66 (05 Aug 2018 04:55) (66 - 92)  BP: 144/99 (05 Aug 2018 04:55) (112/69 - 144/99)  BP(mean): --  RR: 16 (05 Aug 2018 04:55) (16 - 17)  SpO2: 95% (05 Aug 2018 04:55) (95% - 97%)    LABS/DIAGNOSTIC TESTS:                          12.1   15.0  )-----------( 270      ( 05 Aug 2018 07:22 )             37.1     WBC trend  15.0  13.8  15.7  12.1  11.3  10.3    08-05    138  |  104  |  55<H>  ----------------------------<  111<H>  3.9   |  24  |  2.29<H>    Ca    9.6      05 Aug 2018 07:22  Phos  5.0     08-05  Mg     1.7     08-05        CULTURES:   .Urine Bladder (from O.R.)  07-29 @ 00:34   No growth at 48 hours  --  --      .Urine Catheterized  07-29 @ 00:15   No growth  --  --          RADIOLOGY:    < from: CT Abdomen and Pelvis No Cont (08.04.18 @ 16:34) >  EXAM:  CT ABDOMEN AND PELVIS                            PROCEDURE DATE:  08/04/2018          INTERPRETATION:  CT of the Abdomen without contrast and CT of the Pelvis   without contrast    HISTORY: Check stent position    Comparison: 7/26/2018    TECHNIQUE: Multiple axial scans of the abdomen and pelvis were obtained   without administration of IV or bowel contrast material.      Interpretation: Liver: No focal lesions.      Biliary tree: Not dilated. Calcified gallstones are present in the   collapsed gallbladder.      Solid organs: Similar left renal atrophy.      Retroperitoneum: Bilateral double-J ureteral stents extending from the   renal pelves to the urinary bladder. There is a 10 mm stone at the left   ureteropelvic junction as well as an 8.5 mm stone in the lower pole of   the right collecting system.    Bowel: No evidence of ascites, bowel obstruction or free air.      Appendix: Normal    Urinary bladder: Contains a small amount of air and similar large bladder   calculus.      Pelvis: Shows similar calcified uterine mass likely indicating exophytic   fibroid.      The inguinal regions are unremarkable.      The lung bases are clear.      IMPRESSION: Ureteral stents and stones as described.    Thank you for  this referral.        JOSE GUADALUPE WILSON M.D., ATTENDING RADIOLOGIST  This document has been electronically signed. Aug  4 2018  4:57PM    < end of copied text >

## 2018-08-05 NOTE — PROGRESS NOTE ADULT - ATTENDING COMMENTS
urine clear   cr trending down   cont langford until output decreaes  monitor lytes
doing well  can remove langford when outputs nl   follow cr
Patient with acute DVT on doppler. started on anticoagulation. Will start coumadin and follow up INR. Will bridge coumadin until INR is 2.
Patient is seen and examined. Case reviewed with the medical team. Above note is appreciated. Will follow up clinically. Continue DVT prophylaxis.
Patient is seen and examined. Case reviewed with the medical team. Above note is appreciated. Will follow up clinically. Continue DVT prophylaxis. Case discussed with ID. will continue rocephin and follow up. will speak to vascular on monday to see for how long to continue the anticoagulation for peroneal DVT.
Patient is seen and examined. Case reviewed with the medical team. Above note is appreciated. Will follow up clinically. Continue DVT prophylaxis. Case discussed with ID. will continue rocephin and follow up. will speak to vascular on monday to see for how long to continue the anticoagulation for peroneal DVT.
Patient is seen and examined. Case reviewed with the medical team. Above note is appreciated. Will follow up clinically. Continue DVT prophylaxis. Will continue eliquis. will follow up renal function. Discharge planning for a likely discharge in am.
I agree with above

## 2018-08-05 NOTE — PROGRESS NOTE ADULT - ASSESSMENT
Patient admitted for concerns of acute renal failure with post-obstructive component, requiring urological stent placement and further monitoring of urine output and renal functions.    2 episodes of watery diarrhea Friday with elevated WBC count. no further episodes of diarrhea today  ***Monday team to call Vascular Dr Jones for thoughts of AC for peroneal DVT

## 2018-08-06 ENCOUNTER — TRANSCRIPTION ENCOUNTER (OUTPATIENT)
Age: 78
End: 2018-08-06

## 2018-08-06 VITALS
TEMPERATURE: 98 F | HEART RATE: 81 BPM | DIASTOLIC BLOOD PRESSURE: 77 MMHG | RESPIRATION RATE: 17 BRPM | OXYGEN SATURATION: 95 % | SYSTOLIC BLOOD PRESSURE: 148 MMHG

## 2018-08-06 LAB
ANION GAP SERPL CALC-SCNC: 10 MMOL/L — SIGNIFICANT CHANGE UP (ref 5–17)
BUN SERPL-MCNC: 57 MG/DL — HIGH (ref 7–18)
CALCIUM SERPL-MCNC: 9.2 MG/DL — SIGNIFICANT CHANGE UP (ref 8.4–10.5)
CHLORIDE SERPL-SCNC: 105 MMOL/L — SIGNIFICANT CHANGE UP (ref 96–108)
CO2 SERPL-SCNC: 24 MMOL/L — SIGNIFICANT CHANGE UP (ref 22–31)
CREAT SERPL-MCNC: 2.35 MG/DL — HIGH (ref 0.5–1.3)
GLUCOSE BLDC GLUCOMTR-MCNC: 111 MG/DL — HIGH (ref 70–99)
GLUCOSE BLDC GLUCOMTR-MCNC: 139 MG/DL — HIGH (ref 70–99)
GLUCOSE SERPL-MCNC: 108 MG/DL — HIGH (ref 70–99)
HCT VFR BLD CALC: 36.9 % — SIGNIFICANT CHANGE UP (ref 34.5–45)
HGB BLD-MCNC: 12.2 G/DL — SIGNIFICANT CHANGE UP (ref 11.5–15.5)
MAGNESIUM SERPL-MCNC: 2 MG/DL — SIGNIFICANT CHANGE UP (ref 1.6–2.6)
MCHC RBC-ENTMCNC: 28.8 PG — SIGNIFICANT CHANGE UP (ref 27–34)
MCHC RBC-ENTMCNC: 33.1 GM/DL — SIGNIFICANT CHANGE UP (ref 32–36)
MCV RBC AUTO: 87.2 FL — SIGNIFICANT CHANGE UP (ref 80–100)
PHOSPHATE SERPL-MCNC: 4.4 MG/DL — SIGNIFICANT CHANGE UP (ref 2.5–4.5)
PLATELET # BLD AUTO: 274 K/UL — SIGNIFICANT CHANGE UP (ref 150–400)
POTASSIUM SERPL-MCNC: 4 MMOL/L — SIGNIFICANT CHANGE UP (ref 3.5–5.3)
POTASSIUM SERPL-SCNC: 4 MMOL/L — SIGNIFICANT CHANGE UP (ref 3.5–5.3)
RBC # BLD: 4.23 M/UL — SIGNIFICANT CHANGE UP (ref 3.8–5.2)
RBC # FLD: 12.3 % — SIGNIFICANT CHANGE UP (ref 10.3–14.5)
SODIUM SERPL-SCNC: 139 MMOL/L — SIGNIFICANT CHANGE UP (ref 135–145)
WBC # BLD: 12.4 K/UL — HIGH (ref 3.8–10.5)
WBC # FLD AUTO: 12.4 K/UL — HIGH (ref 3.8–10.5)

## 2018-08-06 PROCEDURE — 80061 LIPID PANEL: CPT

## 2018-08-06 PROCEDURE — C1769: CPT

## 2018-08-06 PROCEDURE — 93970 EXTREMITY STUDY: CPT

## 2018-08-06 PROCEDURE — 84100 ASSAY OF PHOSPHORUS: CPT

## 2018-08-06 PROCEDURE — 87086 URINE CULTURE/COLONY COUNT: CPT

## 2018-08-06 PROCEDURE — 82962 GLUCOSE BLOOD TEST: CPT

## 2018-08-06 PROCEDURE — 80048 BASIC METABOLIC PNL TOTAL CA: CPT

## 2018-08-06 PROCEDURE — 85610 PROTHROMBIN TIME: CPT

## 2018-08-06 PROCEDURE — 74176 CT ABD & PELVIS W/O CONTRAST: CPT

## 2018-08-06 PROCEDURE — 97116 GAIT TRAINING THERAPY: CPT

## 2018-08-06 PROCEDURE — 86850 RBC ANTIBODY SCREEN: CPT

## 2018-08-06 PROCEDURE — 71046 X-RAY EXAM CHEST 2 VIEWS: CPT

## 2018-08-06 PROCEDURE — 93005 ELECTROCARDIOGRAM TRACING: CPT

## 2018-08-06 PROCEDURE — 83036 HEMOGLOBIN GLYCOSYLATED A1C: CPT

## 2018-08-06 PROCEDURE — 87486 CHLMYD PNEUM DNA AMP PROBE: CPT

## 2018-08-06 PROCEDURE — C2617: CPT

## 2018-08-06 PROCEDURE — C1758: CPT

## 2018-08-06 PROCEDURE — 94640 AIRWAY INHALATION TREATMENT: CPT

## 2018-08-06 PROCEDURE — 80053 COMPREHEN METABOLIC PANEL: CPT

## 2018-08-06 PROCEDURE — 97110 THERAPEUTIC EXERCISES: CPT

## 2018-08-06 PROCEDURE — 85027 COMPLETE CBC AUTOMATED: CPT

## 2018-08-06 PROCEDURE — 86901 BLOOD TYPING SEROLOGIC RH(D): CPT

## 2018-08-06 PROCEDURE — 87798 DETECT AGENT NOS DNA AMP: CPT

## 2018-08-06 PROCEDURE — 76000 FLUOROSCOPY <1 HR PHYS/QHP: CPT

## 2018-08-06 PROCEDURE — 86900 BLOOD TYPING SEROLOGIC ABO: CPT

## 2018-08-06 PROCEDURE — 84443 ASSAY THYROID STIM HORMONE: CPT

## 2018-08-06 PROCEDURE — 87633 RESP VIRUS 12-25 TARGETS: CPT

## 2018-08-06 PROCEDURE — 97530 THERAPEUTIC ACTIVITIES: CPT

## 2018-08-06 PROCEDURE — 85730 THROMBOPLASTIN TIME PARTIAL: CPT

## 2018-08-06 PROCEDURE — 81001 URINALYSIS AUTO W/SCOPE: CPT

## 2018-08-06 PROCEDURE — 71045 X-RAY EXAM CHEST 1 VIEW: CPT

## 2018-08-06 PROCEDURE — 87581 M.PNEUMON DNA AMP PROBE: CPT

## 2018-08-06 PROCEDURE — 83735 ASSAY OF MAGNESIUM: CPT

## 2018-08-06 PROCEDURE — 99285 EMERGENCY DEPT VISIT HI MDM: CPT | Mod: 25

## 2018-08-06 RX ORDER — CEFUROXIME AXETIL 250 MG
1 TABLET ORAL
Qty: 10 | Refills: 0 | OUTPATIENT
Start: 2018-08-06 | End: 2018-08-10

## 2018-08-06 RX ORDER — METOPROLOL TARTRATE 50 MG
1 TABLET ORAL
Qty: 60 | Refills: 0 | OUTPATIENT
Start: 2018-08-06 | End: 2018-09-04

## 2018-08-06 RX ORDER — TIOTROPIUM BROMIDE 18 UG/1
1 CAPSULE ORAL; RESPIRATORY (INHALATION)
Qty: 1 | Refills: 0 | OUTPATIENT
Start: 2018-08-06 | End: 2018-09-04

## 2018-08-06 RX ORDER — SEVELAMER CARBONATE 2400 MG/1
1 POWDER, FOR SUSPENSION ORAL
Qty: 90 | Refills: 0 | OUTPATIENT
Start: 2018-08-06 | End: 2018-09-04

## 2018-08-06 RX ADMIN — CALAMINE AND ZINC OXIDE AND PHENOL 1 APPLICATION(S): 160; 10 LOTION TOPICAL at 13:08

## 2018-08-06 RX ADMIN — ALBUTEROL 1 PUFF(S): 90 AEROSOL, METERED ORAL at 05:41

## 2018-08-06 RX ADMIN — CALAMINE AND ZINC OXIDE AND PHENOL 1 APPLICATION(S): 160; 10 LOTION TOPICAL at 05:42

## 2018-08-06 RX ADMIN — CEFTRIAXONE 100 GRAM(S): 500 INJECTION, POWDER, FOR SOLUTION INTRAMUSCULAR; INTRAVENOUS at 12:04

## 2018-08-06 RX ADMIN — SEVELAMER CARBONATE 800 MILLIGRAM(S): 2400 POWDER, FOR SUSPENSION ORAL at 13:08

## 2018-08-06 RX ADMIN — Medication 1 TABLET(S): at 08:13

## 2018-08-06 RX ADMIN — Medication 25 MILLIGRAM(S): at 05:41

## 2018-08-06 RX ADMIN — ALBUTEROL 1 PUFF(S): 90 AEROSOL, METERED ORAL at 13:48

## 2018-08-06 RX ADMIN — Medication 1 TABLET(S): at 12:04

## 2018-08-06 RX ADMIN — APIXABAN 10 MILLIGRAM(S): 2.5 TABLET, FILM COATED ORAL at 05:42

## 2018-08-06 RX ADMIN — SEVELAMER CARBONATE 800 MILLIGRAM(S): 2400 POWDER, FOR SUSPENSION ORAL at 05:41

## 2018-08-06 NOTE — DISCHARGE NOTE ADULT - PLAN OF CARE
You presented with acute renal failure and were found to have an Obstructing stone requiring a ureteral stent.  Your renal function improved post stenting.  You were started on IV antibiotics for a UTI.  Please continue your current antibiotic regimen, and schedule a follow up with your PCP in 1 week.  Please follow up with Dr. Caldwell within 1 week for further management and follow up lab work. Please continue tiotropium therapy You presented with bronchitis and were placed on Spiriva therapy.  Please continue current therapy as needed and follow up with your PCP in 1 week. Please follow up with your PCP within 1 week You experienced lower extremity swelling and Lower ext doppler ultrasound was consistent with lower leg Rt peroneal DVT.  You were started on eliquis.  You were evaluated by vascular surgery who do not believe anticoagulation is indicated for this DVT.  Please follow up with your PCP in 1 week for further management. Please continue your current antihypertensive regimen Please schedule close follow up with Urologist, Dr. Caldwell, within 1 week You experienced lower extremity swelling and Lower ext doppler ultrasound was consistent with lower leg Rt peroneal DVT.  You were started on eliquis.  You were evaluated by vascular surgery who do not believe anticoagulation is indicated for this DVT.  Vascular surgery recommends weekly LE Doppler US for 2 weeks to eval for extension of DVT.  Please follow up with your PCP in 1 week for further management. You experienced lower extremity swelling and Lower ext doppler ultrasound was consistent with lower leg Rt peroneal DVT.  You were started on eliquis.  You were evaluated by vascular surgery, Dr. Das, who dis not believe anticoagulation was indicated for this DVT.  Vascular surgery recommends weekly LE Doppler US for 2 weeks to eval for extension of DVT.  Please follow up with him each week for the next 2 weeks for further management.  Please follow up with your PCP in 1 week for further management.

## 2018-08-06 NOTE — PROGRESS NOTE ADULT - NSHPATTENDINGPLANDISCUSS_GEN_ALL_CORE
with the patient and medical team
the patient and medical team
the patient
the patient and medical team
the medical team and the patient
the patient and medical team

## 2018-08-06 NOTE — DISCHARGE NOTE ADULT - MEDICATION SUMMARY - MEDICATIONS TO TAKE
I will START or STAY ON the medications listed below when I get home from the hospital:    metoprolol tartrate 25 mg oral tablet  -- 1 tab(s) by mouth 2 times a day  -- Indication: For HTN (hypertension)    tiotropium 18 mcg inhalation capsule  -- 1 cap(s) inhaled once a day  -- Indication: For Bronchitis    cefuroxime 500 mg oral tablet  -- 1 tab(s) by mouth 2 times a day   -- Finish all this medication unless otherwise directed by prescriber.  Medication should be taken with plenty of water.  Take with food or milk.    -- Indication: For Kidney stones    sevelamer hydrochloride 800 mg oral tablet  -- 1 tab(s) by mouth 3 times a day  -- Indication: For Need for prophylactic measure

## 2018-08-06 NOTE — CONSULT NOTE ADULT - PROBLEM SELECTOR RECOMMENDATION 9
No anticoagulation needed at present time.  Recommend weekly RLE proximal duplex to r/o extension for 2 weeks.

## 2018-08-06 NOTE — DISCHARGE NOTE ADULT - CARE PROVIDER_API CALL
Nir Caldwell (MD), Urology  [address]  [address]  [place] [address]  Phone: [phone]  Fax: [phone] Nir Caldwell), Urology  [address]  [address]  [place] [address]  Phone: [phone]  Fax: [phone]    Jesus Das), Vascular Surgery  [address]  [address]  [place] [address]  Phone: [phone]  Fax: [phone]

## 2018-08-06 NOTE — DISCHARGE NOTE ADULT - HOSPITAL COURSE
77 y/o F pt with a PMHx of HTN with no significant PSHx presented to the ED for further workup of acute renal failure.  Pt reported that she had a CT done last Wednesday for evaluation of kidney stones as outpatient with urologist Dr. Caldwell and received a call back yesterday informing her that she had obstructing stones, and advised to come to the ED for further management. During workup in the ED, patient found to have elevated creatinine of 14.4. Patient did state that she has had right-sided flank pain, cramping in nature, however has subsided for the last few days. Pt was making small amount of urine daily, has not voided today yet; patient states that her son had a similar issue in the past which required stents. Patient stated she generally has lower extremity swelling, however noticed that it has gotten worse lately. Patient also noted she was slightly short of breath when coming into the ED, however feels much better after nebulizer treatment.    Pt admitted to medicine for acute renal failure 2/2 urinary obstruction  Urology consult recommended urgent cystoscopy and b/l ureteral stent placement, which successfully occurred on 7/28/19. Ciprofloxacin for urologic procedure prophylaxis.  Pt CT finding remarkable for:   1) Mild to moderate right hydronephrosis 2/2  8 mm obstructing stone at the right UPJ. An additional 9 mm non-obstructing right lower pole renal calculus  2) Markedly atrophic left kidney with a 1.4 cm stone in the left renal pelvis at the UPJ. Additional smaller left intrarenal calculi. 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   Pt presented with shortness of breath (likely due to fluid overload from obstruction), which was treated s/p 1 dose of duonebs.  12 lead ECG showed normal sinus rhythm at a rate of 81 bpm.   Chest x ray showed no infiltrate. Prominent aortic arch and tortuous descending thoracic aorta. Aortic aneurysm or distal mediastinal mass is not excluded.   Pt had 2 watery BM's, so CIPRO was stopped with concern for cdiff.  ID, Dr. Elliott, was consulted and pt was started on Rocephin therapy.  PT consult recommended balance training; gait training; transfer training.  Post op patient presented with peroneal DVT. Started on eliquis therapy, loading dose 10 mg BID x 14 doses.   Pt medically cleared for discharge.

## 2018-08-06 NOTE — CONSULT NOTE ADULT - SUBJECTIVE AND OBJECTIVE BOX
Patient is a 78y old  Female who presents with a chief complaint of obstructing renal stone (28 Jul 2018 12:28)      HPI  Called see and eval 78y.o. Female w/PMH significant for renal calculi s/p cysto b/l stents for R peroneal DVT. Pt sent in by urologist 7/28/18 for b/l obstructing ureteral calculi found on outpatient CT. Cr 14 on admission. Pt underwent cysto, b/l ureteral stent insertion same day. Pt was noted to have b/l leg swelling, which pt states increased in the past 3-4 weeks from her usual state of swelling. On 7/30/18, b/l LE duplex showed a R peroneal DVT, with unremarkable proximal LE veins b/l. Pt was started on hep gtt and     PAST MEDICAL & SURGICAL HISTORY:  Kidney stones  HTN (hypertension)  No significant past surgical history      MEDICATIONS  (STANDING):  ALBUTerol    90 MICROgram(s) HFA Inhaler 1 Puff(s) Inhalation every 4 hours  apixaban 10 milliGRAM(s) Oral every 12 hours  calamine Lotion 1 Application(s) Topical three times a day  cefTRIAXone   IVPB 1 Gram(s) IV Intermittent every 24 hours  cefTRIAXone   IVPB      dextrose 5%. 1000 milliLiter(s) (50 mL/Hr) IV Continuous <Continuous>  dextrose 50% Injectable 12.5 Gram(s) IV Push once  dextrose 50% Injectable 25 Gram(s) IV Push once  dextrose 50% Injectable 25 Gram(s) IV Push once  insulin lispro (HumaLOG) corrective regimen sliding scale   SubCutaneous three times a day before meals  lactobacillus acidophilus 1 Tablet(s) Oral three times a day with meals  metoprolol tartrate 25 milliGRAM(s) Oral two times a day  sevelamer hydrochloride 800 milliGRAM(s) Oral three times a day  sodium chloride 0.9%. 1000 milliLiter(s) (75 mL/Hr) IV Continuous <Continuous>  tiotropium 18 MICROgram(s) Capsule 1 Capsule(s) Inhalation daily    MEDICATIONS  (PRN):  dextrose 40% Gel 15 Gram(s) Oral once PRN Blood Glucose LESS THAN 70 milliGRAM(s)/deciliter  diphenhydrAMINE   Capsule 25 milliGRAM(s) Oral every 6 hours PRN Rash and/or Itching  glucagon  Injectable 1 milliGRAM(s) IntraMuscular once PRN Glucose LESS THAN 70 milligrams/deciliter  sodium chloride 0.65% Nasal 1 Spray(s) Both Nostrils three times a day PRN Nasal Congestion      Allergies    penicillin (Rash)    Intolerances        Vital Signs Last 24 Hrs  T(C): 36.9 (06 Aug 2018 06:40), Max: 36.9 (06 Aug 2018 06:40)  T(F): 98.5 (06 Aug 2018 06:40), Max: 98.5 (06 Aug 2018 06:40)  HR: 81 (06 Aug 2018 06:40) (72 - 81)  BP: 148/77 (06 Aug 2018 06:40) (132/74 - 148/77)  BP(mean): --  RR: 17 (06 Aug 2018 06:40) (17 - 17)  SpO2: 95% (06 Aug 2018 06:40) (91% - 95%)    Physical:  Gen: A&Ox3. NAD  Abd: Soft ND, NT        I&O's Detail    05 Aug 2018 07:01  -  06 Aug 2018 07:00  --------------------------------------------------------  IN:    Oral Fluid: 300 mL  Total IN: 300 mL    OUT:    Voided: 3 mL  Total OUT: 3 mL    Total NET: 297 mL          LABS:                        12.2   12.4  )-----------( 274      ( 06 Aug 2018 08:23 )             36.9              08-06    139  |  105  |  57<H>  ----------------------------<  108<H>  4.0   |  24  |  2.35<H>    Ca    9.2      06 Aug 2018 08:23  Phos  4.4     08-06  Mg     2.0     08-06                    RADIOLOGY & ADDITIONAL STUDIES: Patient is a 78y old  Female who presents with a chief complaint of obstructing renal stone (28 Jul 2018 12:28)      HPI  Called see and eval 78y.o. Female w/PMH significant for renal calculi s/p cysto b/l stents for R peroneal DVT. Pt sent in by urologist 7/28/18 for b/l obstructing ureteral calculi found on outpatient CT. Cr 14 on admission. Pt underwent cysto, b/l ureteral stent insertion same day. Pt was noted to have b/l leg swelling, which pt states increased in the past 3-4 weeks from her usual state of swelling. On 7/30/18, b/l LE duplex showed a R peroneal DVT, with unremarkable proximal LE veins b/l. Of note, pt was on subq heparin 5000U q8h since admission up until this point. Pt was started on hep gtt and bridged to coumadin the next day. Pt was then switched to Eliquis. Denies LE pain, numbness/tingling of LE, CP, SOB    PAST MEDICAL & SURGICAL HISTORY:  Kidney stones  HTN (hypertension)  No significant past surgical history      MEDICATIONS  (STANDING):  ALBUTerol    90 MICROgram(s) HFA Inhaler 1 Puff(s) Inhalation every 4 hours  apixaban 10 milliGRAM(s) Oral every 12 hours  calamine Lotion 1 Application(s) Topical three times a day  cefTRIAXone   IVPB 1 Gram(s) IV Intermittent every 24 hours  cefTRIAXone   IVPB      dextrose 5%. 1000 milliLiter(s) (50 mL/Hr) IV Continuous <Continuous>  dextrose 50% Injectable 12.5 Gram(s) IV Push once  dextrose 50% Injectable 25 Gram(s) IV Push once  dextrose 50% Injectable 25 Gram(s) IV Push once  insulin lispro (HumaLOG) corrective regimen sliding scale   SubCutaneous three times a day before meals  lactobacillus acidophilus 1 Tablet(s) Oral three times a day with meals  metoprolol tartrate 25 milliGRAM(s) Oral two times a day  sevelamer hydrochloride 800 milliGRAM(s) Oral three times a day  sodium chloride 0.9%. 1000 milliLiter(s) (75 mL/Hr) IV Continuous <Continuous>  tiotropium 18 MICROgram(s) Capsule 1 Capsule(s) Inhalation daily    MEDICATIONS  (PRN):  dextrose 40% Gel 15 Gram(s) Oral once PRN Blood Glucose LESS THAN 70 milliGRAM(s)/deciliter  diphenhydrAMINE   Capsule 25 milliGRAM(s) Oral every 6 hours PRN Rash and/or Itching  glucagon  Injectable 1 milliGRAM(s) IntraMuscular once PRN Glucose LESS THAN 70 milligrams/deciliter  sodium chloride 0.65% Nasal 1 Spray(s) Both Nostrils three times a day PRN Nasal Congestion      Allergies    penicillin (Rash)    Vital Signs Last 24 Hrs  T(C): 36.9 (06 Aug 2018 06:40), Max: 36.9 (06 Aug 2018 06:40)  T(F): 98.5 (06 Aug 2018 06:40), Max: 98.5 (06 Aug 2018 06:40)  HR: 81 (06 Aug 2018 06:40) (72 - 81)  BP: 148/77 (06 Aug 2018 06:40) (132/74 - 148/77)  BP(mean): --  RR: 17 (06 Aug 2018 06:40) (17 - 17)  SpO2: 95% (06 Aug 2018 06:40) (91% - 95%)    Physical:  Gen: A&Ox3. NAD  LLE: Warm, NVI, nontender, mild 1+ pitting edema at level of ankle. Palpable DP/PT pulses. No skin discoloration  RLE: Warm, NVI, nontender. Palpable DP/PT pulses. No skin discoloration. Size equal to left    LABS:                        12.2   12.4  )-----------( 274      ( 06 Aug 2018 08:23 )             36.9              08-06    139  |  105  |  57<H>  ----------------------------<  108<H>  4.0   |  24  |  2.35<H>    Ca    9.2      06 Aug 2018 08:23  Phos  4.4     08-06  Mg     2.0     08-06    RADIOLOGY & ADDITIONAL STUDIES:  < from: US Duplex Venous Lower Ext Complete, Bilateral (07.30.18 @ 14:03) >  There is DVT in the right peroneal vein.    The bilateral common femoral, superficial femoral, popliteal and   visualized left calf veins are normally compressible and demonstrate   normal spontaneous and phasic flow and/or augmentation. There is no   evidence of deep vein thrombosis.    < end of copied text >

## 2018-08-06 NOTE — DISCHARGE NOTE ADULT - CARE PROVIDERS DIRECT ADDRESSES
,[email or web address]

## 2018-08-06 NOTE — PROGRESS NOTE ADULT - SUBJECTIVE AND OBJECTIVE BOX
Problem List:    PAST MEDICAL & SURGICAL HISTORY:  Kidney stones  HTN (hypertension)  No significant past surgical history      penicillin (Rash)      MEDICATIONS  (STANDING):  ALBUTerol    90 MICROgram(s) HFA Inhaler 1 Puff(s) Inhalation every 4 hours  apixaban 10 milliGRAM(s) Oral every 12 hours  calamine Lotion 1 Application(s) Topical three times a day  cefTRIAXone   IVPB 1 Gram(s) IV Intermittent every 24 hours  cefTRIAXone   IVPB      dextrose 5%. 1000 milliLiter(s) (50 mL/Hr) IV Continuous <Continuous>  dextrose 50% Injectable 12.5 Gram(s) IV Push once  dextrose 50% Injectable 25 Gram(s) IV Push once  dextrose 50% Injectable 25 Gram(s) IV Push once  insulin lispro (HumaLOG) corrective regimen sliding scale   SubCutaneous three times a day before meals  lactobacillus acidophilus 1 Tablet(s) Oral three times a day with meals  metoprolol tartrate 25 milliGRAM(s) Oral two times a day  sevelamer hydrochloride 800 milliGRAM(s) Oral three times a day  sodium chloride 0.9%. 1000 milliLiter(s) (75 mL/Hr) IV Continuous <Continuous>  tiotropium 18 MICROgram(s) Capsule 1 Capsule(s) Inhalation daily    MEDICATIONS  (PRN):  dextrose 40% Gel 15 Gram(s) Oral once PRN Blood Glucose LESS THAN 70 milliGRAM(s)/deciliter  diphenhydrAMINE   Capsule 25 milliGRAM(s) Oral every 6 hours PRN Rash and/or Itching  glucagon  Injectable 1 milliGRAM(s) IntraMuscular once PRN Glucose LESS THAN 70 milligrams/deciliter  sodium chloride 0.65% Nasal 1 Spray(s) Both Nostrils three times a day PRN Nasal Congestion                            12.2   12.4  )-----------( 274      ( 06 Aug 2018 08:23 )             36.9     08-06    139  |  105  |  57<H>  ----------------------------<  108<H>  4.0   |  24  |  2.35<H>    Ca    9.2      06 Aug 2018 08:23  Phos  4.4     08-06  Mg     2.0     08-06              REVIEW OF SYSTEMS:  General: no fever no chills, no weight loss.  EYES/ENT: No visual changes;  No vertigo, no headache.  NECK: No pain or stiffness  RESPIRATORY: No cough, wheezing, hemoptysis; No shortness of breath  CARDIOVASCULAR: No chest pain or palpitations. No Edema  GASTROINTESTINAL: No abdominal or epigastric pain. No nausea, vomiting. No diarrhea or constipation. No melena.  GENITOURINARY: No dysuria, frequency, foamy urine, urinary urgency, incontinence or hematuria  NEUROLOGICAL: No numbness or weakness, no tremor , no dizziness.   Muscle skeletal : no joint pain and no swelling of joints and limbs.  SKIN: No itching, burning, rashes.        VITALS:  T(F): 98.5 (08-06-18 @ 06:40), Max: 98.5 (08-06-18 @ 06:40)  HR: 81 (08-06-18 @ 06:40)  BP: 148/77 (08-06-18 @ 06:40)  RR: 17 (08-06-18 @ 06:40)  SpO2: 95% (08-06-18 @ 06:40)  Wt(kg): --    08-05 @ 07:01  -  08-06 @ 07:00  --------------------------------------------------------  IN: 300 mL / OUT: 3 mL / NET: 297 mL        PHYSICAL EXAM:  Constitutional: well developed, no diaphoresis, no distress.  Neck: No JVD, no carotid bruit, supple, no adenopathy  Respiratory: Good air entrance B/L, no wheezes, rales or rhonchi  Cardiovascular: S1, S2, RRR, no pericardial rub, no murmur  Abdomen: BS+, soft, no tenderness, no bruit  Pelvis: bladder nondistended  Extremities: No cyanosis or clubbing. No peripheral edema.   Pulses: All present  Neurological: A/O x 3, no focal deficits  Psychiatric: Normal mood, normal affect  Skin: No rashes  Vascular Access: Problem List:  PAUL due to obstructive uropathy  Nephrolithiasis  DVT RLE on Apixaban.    Base line creatinine un known    Left Atrophic kidney      PAST MEDICAL & SURGICAL HISTORY:  Kidney stones  HTN (hypertension)  No significant past surgical history      penicillin (Rash)      MEDICATIONS  (STANDING):  ALBUTerol    90 MICROgram(s) HFA Inhaler 1 Puff(s) Inhalation every 4 hours  apixaban 10 milliGRAM(s) Oral every 12 hours  calamine Lotion 1 Application(s) Topical three times a day  cefTRIAXone   IVPB 1 Gram(s) IV Intermittent every 24 hours  cefTRIAXone   IVPB      dextrose 5%. 1000 milliLiter(s) (50 mL/Hr) IV Continuous <Continuous>  dextrose 50% Injectable 12.5 Gram(s) IV Push once  dextrose 50% Injectable 25 Gram(s) IV Push once  dextrose 50% Injectable 25 Gram(s) IV Push once  insulin lispro (HumaLOG) corrective regimen sliding scale   SubCutaneous three times a day before meals  lactobacillus acidophilus 1 Tablet(s) Oral three times a day with meals  metoprolol tartrate 25 milliGRAM(s) Oral two times a day  sevelamer hydrochloride 800 milliGRAM(s) Oral three times a day  sodium chloride 0.9%. 1000 milliLiter(s) (75 mL/Hr) IV Continuous <Continuous>  tiotropium 18 MICROgram(s) Capsule 1 Capsule(s) Inhalation daily    MEDICATIONS  (PRN):  dextrose 40% Gel 15 Gram(s) Oral once PRN Blood Glucose LESS THAN 70 milliGRAM(s)/deciliter  diphenhydrAMINE   Capsule 25 milliGRAM(s) Oral every 6 hours PRN Rash and/or Itching  glucagon  Injectable 1 milliGRAM(s) IntraMuscular once PRN Glucose LESS THAN 70 milligrams/deciliter  sodium chloride 0.65% Nasal 1 Spray(s) Both Nostrils three times a day PRN Nasal Congestion                            12.2   12.4  )-----------( 274      ( 06 Aug 2018 08:23 )             36.9     08-06    139  |  105  |  57<H>  ----------------------------<  108<H>  4.0   |  24  |  2.35<H>    Ca    9.2      06 Aug 2018 08:23  Phos  4.4     08-06  Mg     2.0     08-06              REVIEW OF SYSTEMS:  General: no fever no chills, no weight loss.  EYES/ENT: No visual changes;  No vertigo, no headache.  NECK: No pain or stiffness  RESPIRATORY: No cough, wheezing, hemoptysis; No shortness of breath  CARDIOVASCULAR: No chest pain or palpitations. No Edema  GASTROINTESTINAL: No abdominal or epigastric pain. No nausea, vomiting. No diarrhea or constipation. No melena.  GENITOURINARY: No dysuria, frequency, foamy urine, urinary urgency, incontinence or hematuria  NEUROLOGICAL: No numbness or weakness, no tremor , no dizziness.   Muscle skeletal : no joint pain and no swelling of joints and limbs.  SKIN: No itching, burning, rashes.        VITALS:  T(F): 98.5 (08-06-18 @ 06:40), Max: 98.5 (08-06-18 @ 06:40)  HR: 81 (08-06-18 @ 06:40)  BP: 148/77 (08-06-18 @ 06:40)  RR: 17 (08-06-18 @ 06:40)  SpO2: 95% (08-06-18 @ 06:40)  Wt(kg): --    08-05 @ 07:01  -  08-06 @ 07:00  --------------------------------------------------------  IN: 300 mL / OUT: 3 mL / NET: 297 mL        PHYSICAL EXAM:  Constitutional: well developed, no diaphoresis, no distress.  Neck: No JVD, no carotid bruit, supple, no adenopathy  Respiratory: Good air entrance B/L, no wheezes, rales or rhonchi  Cardiovascular: S1, S2, RRR, few wheezing.  Abdomen: BS+, soft, no tenderness, no bruit  Pelvis: bladder nondistended  Extremities: No cyanosis or clubbing. No peripheral edema.

## 2018-08-06 NOTE — PATIENT PROFILE ADULT. - NSSUBSTANCEUSE_GEN_ALL_CORE_SD
HPI:    Patient ID: Nichole Sanchez is a 27year old female. HPI  Ms. Briseno is a pleasant 68-chronici never used

## 2018-08-06 NOTE — PROGRESS NOTE ADULT - ASSESSMENT
PAUL due to calculi obstruction  Post stent placement.  Hematuria due to renal calculi in stents areas and in bladder areas.  CT no obstruction.    Creatinine 2.35 .  Suggest follow up of renal function before admission.  Urology follow up   Renal function follow up.

## 2018-08-06 NOTE — DISCHARGE NOTE ADULT - PATIENT PORTAL LINK FT
You can access the [email or web address], offered by [facility], by registering with the following website: [email or web address]

## 2018-08-06 NOTE — DISCHARGE NOTE ADULT - CARE PLAN
Principal Discharge DX:	Kidney stones  Goal:	Please schedule close follow up with Urologist, Dr. Caldwell, within 1 week  Assessment and plan of treatment:	You presented with acute renal failure and were found to have an Obstructing stone requiring a ureteral stent.  Your renal function improved post stenting.  You were started on IV antibiotics for a UTI.  Please continue your current antibiotic regimen, and schedule a follow up with your PCP in 1 week.  Please follow up with Dr. Caldwell within 1 week for further management and follow up lab work.  Secondary Diagnosis:	Bronchitis  Goal:	Please continue tiotropium therapy  Assessment and plan of treatment:	You presented with bronchitis and were placed on Spiriva therapy.  Please continue current therapy as needed and follow up with your PCP in 1 week.  Secondary Diagnosis:	DVT (deep venous thrombosis)  Goal:	Please follow up with your PCP within 1 week  Assessment and plan of treatment:	You experienced lower extremity swelling and Lower ext doppler ultrasound was consistent with lower leg Rt peroneal DVT.  You were started on eliquis.  You were evaluated by vascular surgery who do not believe anticoagulation is indicated for this DVT.  Please follow up with your PCP in 1 week for further management.  Secondary Diagnosis:	HTN (hypertension)  Goal:	Please continue your current antihypertensive regimen Principal Discharge DX:	Kidney stones  Goal:	Please schedule close follow up with Urologist, Dr. Caldwell, within 1 week  Assessment and plan of treatment:	You presented with acute renal failure and were found to have an Obstructing stone requiring a ureteral stent.  Your renal function improved post stenting.  You were started on IV antibiotics for a UTI.  Please continue your current antibiotic regimen, and schedule a follow up with your PCP in 1 week.  Please follow up with Dr. Caldwell within 1 week for further management and follow up lab work.  Secondary Diagnosis:	Bronchitis  Goal:	Please continue tiotropium therapy  Assessment and plan of treatment:	You presented with bronchitis and were placed on Spiriva therapy.  Please continue current therapy as needed and follow up with your PCP in 1 week.  Secondary Diagnosis:	DVT (deep venous thrombosis)  Goal:	Please follow up with your PCP within 1 week  Assessment and plan of treatment:	You experienced lower extremity swelling and Lower ext doppler ultrasound was consistent with lower leg Rt peroneal DVT.  You were started on eliquis.  You were evaluated by vascular surgery who do not believe anticoagulation is indicated for this DVT.  Vascular surgery recommends weekly LE Doppler US for 2 weeks to eval for extension of DVT.  Please follow up with your PCP in 1 week for further management.  Secondary Diagnosis:	HTN (hypertension)  Goal:	Please continue your current antihypertensive regimen Principal Discharge DX:	Kidney stones  Goal:	Please schedule close follow up with Urologist, Dr. Caldwell, within 1 week  Assessment and plan of treatment:	You presented with acute renal failure and were found to have an Obstructing stone requiring a ureteral stent.  Your renal function improved post stenting.  You were started on IV antibiotics for a UTI.  Please continue your current antibiotic regimen, and schedule a follow up with your PCP in 1 week.  Please follow up with Dr. Caldwell within 1 week for further management and follow up lab work.  Secondary Diagnosis:	Bronchitis  Goal:	Please continue tiotropium therapy  Assessment and plan of treatment:	You presented with bronchitis and were placed on Spiriva therapy.  Please continue current therapy as needed and follow up with your PCP in 1 week.  Secondary Diagnosis:	DVT (deep venous thrombosis)  Goal:	Please follow up with your PCP within 1 week  Assessment and plan of treatment:	You experienced lower extremity swelling and Lower ext doppler ultrasound was consistent with lower leg Rt peroneal DVT.  You were started on eliquis.  You were evaluated by vascular surgery, Dr. Das, who dis not believe anticoagulation was indicated for this DVT.  Vascular surgery recommends weekly LE Doppler US for 2 weeks to eval for extension of DVT.  Please follow up with him each week for the next 2 weeks for further management.  Please follow up with your PCP in 1 week for further management.  Secondary Diagnosis:	HTN (hypertension)  Goal:	Please continue your current antihypertensive regimen

## 2018-08-07 PROBLEM — I10 ESSENTIAL (PRIMARY) HYPERTENSION: Chronic | Status: ACTIVE | Noted: 2018-07-28

## 2018-08-07 PROBLEM — N20.0 CALCULUS OF KIDNEY: Chronic | Status: ACTIVE | Noted: 2018-07-28

## 2018-08-09 ENCOUNTER — APPOINTMENT (OUTPATIENT)
Dept: UROLOGY | Facility: CLINIC | Age: 78
End: 2018-08-09
Payer: MEDICARE

## 2018-08-09 PROCEDURE — 99213 OFFICE O/P EST LOW 20 MIN: CPT

## 2018-08-15 ENCOUNTER — APPOINTMENT (OUTPATIENT)
Dept: VASCULAR SURGERY | Facility: CLINIC | Age: 78
End: 2018-08-15
Payer: MEDICARE

## 2018-08-15 PROCEDURE — 93970 EXTREMITY STUDY: CPT

## 2018-08-23 ENCOUNTER — APPOINTMENT (OUTPATIENT)
Dept: VASCULAR SURGERY | Facility: CLINIC | Age: 78
End: 2018-08-23
Payer: MEDICARE

## 2018-08-23 ENCOUNTER — APPOINTMENT (OUTPATIENT)
Dept: UROLOGY | Facility: CLINIC | Age: 78
End: 2018-08-23
Payer: MEDICARE

## 2018-08-23 PROCEDURE — 93970 EXTREMITY STUDY: CPT

## 2018-08-23 PROCEDURE — 99214 OFFICE O/P EST MOD 30 MIN: CPT

## 2018-08-23 PROCEDURE — 99213 OFFICE O/P EST LOW 20 MIN: CPT

## 2018-08-30 ENCOUNTER — APPOINTMENT (OUTPATIENT)
Dept: VASCULAR SURGERY | Facility: CLINIC | Age: 78
End: 2018-08-30
Payer: MEDICARE

## 2018-08-30 PROCEDURE — 93970 EXTREMITY STUDY: CPT

## 2018-08-30 PROCEDURE — 99213 OFFICE O/P EST LOW 20 MIN: CPT

## 2018-09-13 ENCOUNTER — OUTPATIENT (OUTPATIENT)
Dept: OUTPATIENT SERVICES | Facility: HOSPITAL | Age: 78
LOS: 1 days | End: 2018-09-13

## 2018-09-13 VITALS
HEART RATE: 59 BPM | WEIGHT: 210.1 LBS | DIASTOLIC BLOOD PRESSURE: 73 MMHG | SYSTOLIC BLOOD PRESSURE: 128 MMHG | OXYGEN SATURATION: 99 % | RESPIRATION RATE: 18 BRPM | TEMPERATURE: 98 F | HEIGHT: 63 IN

## 2018-09-13 DIAGNOSIS — I82.409 ACUTE EMBOLISM AND THROMBOSIS OF UNSPECIFIED DEEP VEINS OF UNSPECIFIED LOWER EXTREMITY: ICD-10-CM

## 2018-09-13 DIAGNOSIS — Z98.41 CATARACT EXTRACTION STATUS, RIGHT EYE: Chronic | ICD-10-CM

## 2018-09-13 DIAGNOSIS — N21.0 CALCULUS IN BLADDER: ICD-10-CM

## 2018-09-13 DIAGNOSIS — Z01.818 ENCOUNTER FOR OTHER PREPROCEDURAL EXAMINATION: ICD-10-CM

## 2018-09-13 DIAGNOSIS — Z98.42 CATARACT EXTRACTION STATUS, LEFT EYE: Chronic | ICD-10-CM

## 2018-09-13 DIAGNOSIS — Z98.890 OTHER SPECIFIED POSTPROCEDURAL STATES: Chronic | ICD-10-CM

## 2018-09-13 DIAGNOSIS — Z96.651 PRESENCE OF RIGHT ARTIFICIAL KNEE JOINT: Chronic | ICD-10-CM

## 2018-09-13 DIAGNOSIS — N20.1 CALCULUS OF URETER: ICD-10-CM

## 2018-09-13 DIAGNOSIS — I10 ESSENTIAL (PRIMARY) HYPERTENSION: ICD-10-CM

## 2018-09-13 LAB
ALBUMIN SERPL ELPH-MCNC: 3.7 G/DL — SIGNIFICANT CHANGE UP (ref 3.5–5)
ALP SERPL-CCNC: 91 U/L — SIGNIFICANT CHANGE UP (ref 40–120)
ALT FLD-CCNC: 27 U/L DA — SIGNIFICANT CHANGE UP (ref 10–60)
ANION GAP SERPL CALC-SCNC: 7 MMOL/L — SIGNIFICANT CHANGE UP (ref 5–17)
APTT BLD: 30.7 SEC — SIGNIFICANT CHANGE UP (ref 27.5–37.4)
AST SERPL-CCNC: 21 U/L — SIGNIFICANT CHANGE UP (ref 10–40)
BILIRUB SERPL-MCNC: 0.5 MG/DL — SIGNIFICANT CHANGE UP (ref 0.2–1.2)
BUN SERPL-MCNC: 25 MG/DL — HIGH (ref 7–18)
CALCIUM SERPL-MCNC: 9.2 MG/DL — SIGNIFICANT CHANGE UP (ref 8.4–10.5)
CHLORIDE SERPL-SCNC: 105 MMOL/L — SIGNIFICANT CHANGE UP (ref 96–108)
CO2 SERPL-SCNC: 29 MMOL/L — SIGNIFICANT CHANGE UP (ref 22–31)
CREAT SERPL-MCNC: 1.39 MG/DL — HIGH (ref 0.5–1.3)
GLUCOSE SERPL-MCNC: 86 MG/DL — SIGNIFICANT CHANGE UP (ref 70–99)
HCT VFR BLD CALC: 37.1 % — SIGNIFICANT CHANGE UP (ref 34.5–45)
HGB BLD-MCNC: 12.1 G/DL — SIGNIFICANT CHANGE UP (ref 11.5–15.5)
INR BLD: 1.02 RATIO — SIGNIFICANT CHANGE UP (ref 0.88–1.16)
MCHC RBC-ENTMCNC: 28.6 PG — SIGNIFICANT CHANGE UP (ref 27–34)
MCHC RBC-ENTMCNC: 32.6 GM/DL — SIGNIFICANT CHANGE UP (ref 32–36)
MCV RBC AUTO: 87.8 FL — SIGNIFICANT CHANGE UP (ref 80–100)
PLATELET # BLD AUTO: 232 K/UL — SIGNIFICANT CHANGE UP (ref 150–400)
POTASSIUM SERPL-MCNC: 4.9 MMOL/L — SIGNIFICANT CHANGE UP (ref 3.5–5.3)
POTASSIUM SERPL-SCNC: 4.9 MMOL/L — SIGNIFICANT CHANGE UP (ref 3.5–5.3)
PROT SERPL-MCNC: 7.8 G/DL — SIGNIFICANT CHANGE UP (ref 6–8.3)
PROTHROM AB SERPL-ACNC: 11.1 SEC — SIGNIFICANT CHANGE UP (ref 9.8–12.7)
RBC # BLD: 4.23 M/UL — SIGNIFICANT CHANGE UP (ref 3.8–5.2)
RBC # FLD: 12.9 % — SIGNIFICANT CHANGE UP (ref 10.3–14.5)
SODIUM SERPL-SCNC: 141 MMOL/L — SIGNIFICANT CHANGE UP (ref 135–145)
WBC # BLD: 9.3 K/UL — SIGNIFICANT CHANGE UP (ref 3.8–10.5)
WBC # FLD AUTO: 9.3 K/UL — SIGNIFICANT CHANGE UP (ref 3.8–10.5)

## 2018-09-13 NOTE — H&P PST ADULT - ASSESSMENT
78 yr old female with PMH of HTN, DVT of lower extremities, obesity, presents with calculus in bladder and calculus in ureter. Pt for cystoscopy, bilateral ureteroscopy with Holmium laser lithotripsy, litholapaxy on 9/18/2018.

## 2018-09-13 NOTE — H&P PST ADULT - NEGATIVE CARDIOVASCULAR SYMPTOMS
no orthopnea/no paroxysmal nocturnal dyspnea/no peripheral edema/no palpitations/no chest pain/no dyspnea on exertion/no claudication

## 2018-09-13 NOTE — H&P PST ADULT - PROBLEM SELECTOR PLAN 2
Continue antihypertensive med and take with sips of water on day of surgery.  Cleared by PCP. Follow-up with PCP postop for management.

## 2018-09-13 NOTE — H&P PST ADULT - PSH
History of lumpectomy of right breast  1/2005 History of cystoscopy  bilateral ureteral stent placement  History of left cataract extraction  2010  History of lumpectomy of right breast  1/2005  History of right cataract extraction  2017  History of total knee replacement, right

## 2018-09-13 NOTE — H&P PST ADULT - FAMILY HISTORY
Child  Still living? Yes, Estimated age: 41-50  Family history of kidney stones, Age at diagnosis: Age Unknown     Father  Still living? No  Cerebrovascular accident, Age at diagnosis: Age Unknown     Mother  Still living? No  Family history of Parkinson's disease, Age at diagnosis: Age Unknown

## 2018-09-13 NOTE — H&P PST ADULT - PMH
HTN (hypertension)    Kidney stones Calculus in bladder    Calculus, ureter    DVT (deep venous thrombosis)  1.partially occlusive subacute DVT noted in the paired posterior tibial veins the right lower extremity and in a peroneal vein 2. occlusive DVT with minimal reconstitution noted in the paired posterior tibial veins the left proximal to mid/distal calf lev  HTN (hypertension)    Kidney stones Breast cancer, right  radiation therapy and chemotherapy  Calculus in bladder    Calculus, ureter    DVT (deep venous thrombosis)  right and left lower extremity  HTN (hypertension)    Kidney stones

## 2018-09-13 NOTE — H&P PST ADULT - HISTORY OF PRESENT ILLNESS
78 yr old female with PMH of HTN, DVT of lower extremities, obesity, presents with c/o bilateral renal stones. Pt reports hematuria and intermittent flank pain. Pt for cystoscopy, 78 yr old female with PMH of HTN, DVT of lower extremities, obesity, presents with c/o bilateral renal stones. Pt reports hematuria and intermittent flank pain. Pt for cystoscopy, bilateral ureteroscopy with Holmium laser lithotripsy, litholapaxy on 9/18/2018.

## 2018-09-13 NOTE — H&P PST ADULT - GENERAL GENITOURINARY SYMPTOMS
flank pain R/increased urinary frequency/hematuria/flank pain L flank pain R/due to renal stones/increased urinary frequency/flank pain L/hematuria

## 2018-09-13 NOTE — H&P PST ADULT - RS GEN PE MLT RESP DETAILS PC
breath sounds equal/no chest wall tenderness/no rhonchi/no wheezes/no rales/no subcutaneous emphysema/no intercostal retractions/respirations non-labored/clear to auscultation bilaterally/normal/airway patent/good air movement

## 2018-09-13 NOTE — H&P PST ADULT - NSANTHOSAYNRD_GEN_A_CORE
No. SUSHMA screening performed.  STOP BANG Legend: 0-2 = LOW Risk; 3-4 = INTERMEDIATE Risk; 5-8 = HIGH Risk

## 2018-09-14 ENCOUNTER — MESSAGE (OUTPATIENT)
Age: 78
End: 2018-09-14

## 2018-09-14 PROBLEM — I82.409 ACUTE EMBOLISM AND THROMBOSIS OF UNSPECIFIED DEEP VEINS OF UNSPECIFIED LOWER EXTREMITY: Chronic | Status: ACTIVE | Noted: 2018-09-13

## 2018-09-15 ENCOUNTER — INPATIENT (INPATIENT)
Facility: HOSPITAL | Age: 78
LOS: 5 days | Discharge: HOME | DRG: 167 | End: 2018-09-21
Attending: INTERNAL MEDICINE | Admitting: INTERNAL MEDICINE
Payer: MEDICARE

## 2018-09-15 VITALS
SYSTOLIC BLOOD PRESSURE: 156 MMHG | DIASTOLIC BLOOD PRESSURE: 79 MMHG | HEART RATE: 70 BPM | RESPIRATION RATE: 16 BRPM | WEIGHT: 220.02 LBS | TEMPERATURE: 98 F | OXYGEN SATURATION: 100 % | HEIGHT: 62 IN

## 2018-09-15 DIAGNOSIS — Z98.42 CATARACT EXTRACTION STATUS, LEFT EYE: Chronic | ICD-10-CM

## 2018-09-15 DIAGNOSIS — I26.99 OTHER PULMONARY EMBOLISM WITHOUT ACUTE COR PULMONALE: ICD-10-CM

## 2018-09-15 DIAGNOSIS — Z98.890 OTHER SPECIFIED POSTPROCEDURAL STATES: Chronic | ICD-10-CM

## 2018-09-15 DIAGNOSIS — Z29.9 ENCOUNTER FOR PROPHYLACTIC MEASURES, UNSPECIFIED: ICD-10-CM

## 2018-09-15 DIAGNOSIS — N28.9 DISORDER OF KIDNEY AND URETER, UNSPECIFIED: ICD-10-CM

## 2018-09-15 DIAGNOSIS — N20.0 CALCULUS OF KIDNEY: ICD-10-CM

## 2018-09-15 DIAGNOSIS — R09.89 OTHER SPECIFIED SYMPTOMS AND SIGNS INVOLVING THE CIRCULATORY AND RESPIRATORY SYSTEMS: ICD-10-CM

## 2018-09-15 DIAGNOSIS — Z98.41 CATARACT EXTRACTION STATUS, RIGHT EYE: Chronic | ICD-10-CM

## 2018-09-15 DIAGNOSIS — Z96.651 PRESENCE OF RIGHT ARTIFICIAL KNEE JOINT: Chronic | ICD-10-CM

## 2018-09-15 DIAGNOSIS — I82.409 ACUTE EMBOLISM AND THROMBOSIS OF UNSPECIFIED DEEP VEINS OF UNSPECIFIED LOWER EXTREMITY: ICD-10-CM

## 2018-09-15 DIAGNOSIS — I10 ESSENTIAL (PRIMARY) HYPERTENSION: ICD-10-CM

## 2018-09-15 LAB
ALBUMIN SERPL ELPH-MCNC: 3.6 G/DL — SIGNIFICANT CHANGE UP (ref 3.5–5)
ALP SERPL-CCNC: 90 U/L — SIGNIFICANT CHANGE UP (ref 40–120)
ALT FLD-CCNC: 29 U/L DA — SIGNIFICANT CHANGE UP (ref 10–60)
ANION GAP SERPL CALC-SCNC: 7 MMOL/L — SIGNIFICANT CHANGE UP (ref 5–17)
ANION GAP SERPL CALC-SCNC: 9 MMOL/L — SIGNIFICANT CHANGE UP (ref 5–17)
APTT BLD: 28.6 SEC — SIGNIFICANT CHANGE UP (ref 27.5–37.4)
AST SERPL-CCNC: 46 U/L — HIGH (ref 10–40)
BASOPHILS # BLD AUTO: 0.1 K/UL — SIGNIFICANT CHANGE UP (ref 0–0.2)
BASOPHILS NFR BLD AUTO: 1.3 % — SIGNIFICANT CHANGE UP (ref 0–2)
BILIRUB SERPL-MCNC: 0.5 MG/DL — SIGNIFICANT CHANGE UP (ref 0.2–1.2)
BUN SERPL-MCNC: 27 MG/DL — HIGH (ref 7–18)
BUN SERPL-MCNC: 30 MG/DL — HIGH (ref 7–18)
CALCIUM SERPL-MCNC: 9.2 MG/DL — SIGNIFICANT CHANGE UP (ref 8.4–10.5)
CALCIUM SERPL-MCNC: 9.3 MG/DL — SIGNIFICANT CHANGE UP (ref 8.4–10.5)
CHLORIDE SERPL-SCNC: 102 MMOL/L — SIGNIFICANT CHANGE UP (ref 96–108)
CHLORIDE SERPL-SCNC: 103 MMOL/L — SIGNIFICANT CHANGE UP (ref 96–108)
CK MB BLD-MCNC: <0.5 % — SIGNIFICANT CHANGE UP (ref 0–3.5)
CK MB CFR SERPL CALC: <1 NG/ML — SIGNIFICANT CHANGE UP (ref 0–3.6)
CK SERPL-CCNC: 220 U/L — HIGH (ref 21–215)
CO2 SERPL-SCNC: 26 MMOL/L — SIGNIFICANT CHANGE UP (ref 22–31)
CO2 SERPL-SCNC: 28 MMOL/L — SIGNIFICANT CHANGE UP (ref 22–31)
CREAT SERPL-MCNC: 1.5 MG/DL — HIGH (ref 0.5–1.3)
CREAT SERPL-MCNC: 1.53 MG/DL — HIGH (ref 0.5–1.3)
D DIMER BLD IA.RAPID-MCNC: 1278 NG/ML DDU — HIGH
EOSINOPHIL # BLD AUTO: 0.4 K/UL — SIGNIFICANT CHANGE UP (ref 0–0.5)
EOSINOPHIL NFR BLD AUTO: 4.4 % — SIGNIFICANT CHANGE UP (ref 0–6)
GLUCOSE SERPL-MCNC: 103 MG/DL — HIGH (ref 70–99)
GLUCOSE SERPL-MCNC: 99 MG/DL — SIGNIFICANT CHANGE UP (ref 70–99)
HCT VFR BLD CALC: 38.5 % — SIGNIFICANT CHANGE UP (ref 34.5–45)
HGB BLD-MCNC: 12.5 G/DL — SIGNIFICANT CHANGE UP (ref 11.5–15.5)
INR BLD: 1 RATIO — SIGNIFICANT CHANGE UP (ref 0.88–1.16)
LYMPHOCYTES # BLD AUTO: 1.4 K/UL — SIGNIFICANT CHANGE UP (ref 1–3.3)
LYMPHOCYTES # BLD AUTO: 15.1 % — SIGNIFICANT CHANGE UP (ref 13–44)
MCHC RBC-ENTMCNC: 28.7 PG — SIGNIFICANT CHANGE UP (ref 27–34)
MCHC RBC-ENTMCNC: 32.6 GM/DL — SIGNIFICANT CHANGE UP (ref 32–36)
MCV RBC AUTO: 88.1 FL — SIGNIFICANT CHANGE UP (ref 80–100)
MONOCYTES # BLD AUTO: 0.7 K/UL — SIGNIFICANT CHANGE UP (ref 0–0.9)
MONOCYTES NFR BLD AUTO: 7.2 % — SIGNIFICANT CHANGE UP (ref 2–14)
NEUTROPHILS # BLD AUTO: 6.8 K/UL — SIGNIFICANT CHANGE UP (ref 1.8–7.4)
NEUTROPHILS NFR BLD AUTO: 72.1 % — SIGNIFICANT CHANGE UP (ref 43–77)
PLATELET # BLD AUTO: 227 K/UL — SIGNIFICANT CHANGE UP (ref 150–400)
POTASSIUM SERPL-MCNC: 4.1 MMOL/L — SIGNIFICANT CHANGE UP (ref 3.5–5.3)
POTASSIUM SERPL-MCNC: 5.4 MMOL/L — HIGH (ref 3.5–5.3)
POTASSIUM SERPL-SCNC: 4.1 MMOL/L — SIGNIFICANT CHANGE UP (ref 3.5–5.3)
POTASSIUM SERPL-SCNC: 5.4 MMOL/L — HIGH (ref 3.5–5.3)
PROT SERPL-MCNC: 7.8 G/DL — SIGNIFICANT CHANGE UP (ref 6–8.3)
PROTHROM AB SERPL-ACNC: 10.9 SEC — SIGNIFICANT CHANGE UP (ref 9.8–12.7)
RBC # BLD: 4.37 M/UL — SIGNIFICANT CHANGE UP (ref 3.8–5.2)
RBC # FLD: 12.8 % — SIGNIFICANT CHANGE UP (ref 10.3–14.5)
SODIUM SERPL-SCNC: 136 MMOL/L — SIGNIFICANT CHANGE UP (ref 135–145)
SODIUM SERPL-SCNC: 139 MMOL/L — SIGNIFICANT CHANGE UP (ref 135–145)
TROPONIN I SERPL-MCNC: <0.015 NG/ML — SIGNIFICANT CHANGE UP (ref 0–0.04)
WBC # BLD: 9.4 K/UL — SIGNIFICANT CHANGE UP (ref 3.8–10.5)
WBC # FLD AUTO: 9.4 K/UL — SIGNIFICANT CHANGE UP (ref 3.8–10.5)

## 2018-09-15 PROCEDURE — 99285 EMERGENCY DEPT VISIT HI MDM: CPT

## 2018-09-15 PROCEDURE — 93010 ELECTROCARDIOGRAM REPORT: CPT

## 2018-09-15 RX ORDER — SEVELAMER CARBONATE 2400 MG/1
800 POWDER, FOR SUSPENSION ORAL THREE TIMES A DAY
Qty: 0 | Refills: 0 | Status: DISCONTINUED | OUTPATIENT
Start: 2018-09-15 | End: 2018-09-17

## 2018-09-15 RX ORDER — FOLIC ACID 0.8 MG
1 TABLET ORAL DAILY
Qty: 0 | Refills: 0 | Status: DISCONTINUED | OUTPATIENT
Start: 2018-09-15 | End: 2018-09-17

## 2018-09-15 RX ORDER — MULTIVIT-MIN/FERROUS GLUCONATE 9 MG/15 ML
1 LIQUID (ML) ORAL DAILY
Qty: 0 | Refills: 0 | Status: DISCONTINUED | OUTPATIENT
Start: 2018-09-15 | End: 2018-09-17

## 2018-09-15 RX ORDER — METOPROLOL TARTRATE 50 MG
25 TABLET ORAL
Qty: 0 | Refills: 0 | Status: DISCONTINUED | OUTPATIENT
Start: 2018-09-15 | End: 2018-09-17

## 2018-09-15 RX ORDER — ENOXAPARIN SODIUM 100 MG/ML
100 INJECTION SUBCUTANEOUS ONCE
Qty: 0 | Refills: 0 | Status: COMPLETED | OUTPATIENT
Start: 2018-09-15 | End: 2018-09-15

## 2018-09-15 RX ORDER — TIOTROPIUM BROMIDE 18 UG/1
1 CAPSULE ORAL; RESPIRATORY (INHALATION) DAILY
Qty: 0 | Refills: 0 | Status: DISCONTINUED | OUTPATIENT
Start: 2018-09-15 | End: 2018-09-17

## 2018-09-15 RX ORDER — CHOLECALCIFEROL (VITAMIN D3) 125 MCG
1000 CAPSULE ORAL DAILY
Qty: 0 | Refills: 0 | Status: DISCONTINUED | OUTPATIENT
Start: 2018-09-15 | End: 2018-09-17

## 2018-09-15 RX ORDER — HEPARIN SODIUM 5000 [USP'U]/ML
INJECTION INTRAVENOUS; SUBCUTANEOUS
Qty: 25000 | Refills: 0 | Status: DISCONTINUED | OUTPATIENT
Start: 2018-09-16 | End: 2018-09-17

## 2018-09-15 RX ADMIN — SEVELAMER CARBONATE 800 MILLIGRAM(S): 2400 POWDER, FOR SUSPENSION ORAL at 21:49

## 2018-09-15 RX ADMIN — ENOXAPARIN SODIUM 100 MILLIGRAM(S): 100 INJECTION SUBCUTANEOUS at 16:11

## 2018-09-15 NOTE — H&P ADULT - PROBLEM SELECTOR PLAN 2
continue to monitor creatinine, as has been improving after stent placement  continue to monitor phos, continue sevelamer at home dose

## 2018-09-15 NOTE — H&P ADULT - NSHPPHYSICALEXAM_GEN_ALL_CORE
Vital Signs Last 24 Hrs  T(C): 36.7 (15 Sep 2018 15:16), Max: 36.8 (15 Sep 2018 10:44)  T(F): 98 (15 Sep 2018 15:16), Max: 98.2 (15 Sep 2018 10:44)  HR: 81 (15 Sep 2018 15:16) (70 - 81)  BP: 138/77 (15 Sep 2018 15:16) (138/77 - 156/79)  RR: 18 (15 Sep 2018 15:16) (16 - 18)  SpO2: 99% (15 Sep 2018 15:16) (99% - 100%)

## 2018-09-15 NOTE — ED ADULT NURSE NOTE - ED STAT RN HANDOFF DETAILS
PT.REMAINED   STABLE.  TO START HEPARIN DRIP AT 2AM.ON TELE BOX h WITH  H WITH NSR.TRANSFER  TO HOLDING.REPORT   BÁRBARA LAO RN.PT.NOT    IN  DISTRESS

## 2018-09-15 NOTE — ED ADULT NURSE NOTE - NSIMPLEMENTINTERV_GEN_ALL_ED
Implemented All Universal Safety Interventions:  [place] to call system. Call bell, personal items and telephone within reach. Instruct patient to call for assistance. Room bathroom lighting operational. Non-slip footwear when patient is off stretcher. Physically safe environment: no spills, clutter or unnecessary equipment. Stretcher in lowest position, wheels locked, appropriate side rails in place.

## 2018-09-15 NOTE — H&P ADULT - ASSESSMENT
Patient admitted due to concerns for PE obtained on CTA done as outpatient at [place], along with bilateral DVT found as outpatient at vascular clinic, treated with high dose aspirin.

## 2018-09-15 NOTE — ED PROVIDER NOTE - NS ED MD DISPO DIVISION
"  Discussion/Summary  Normal device function     Adequate BiV pacing  Results/Data  Cardiac Device Remote [id number] 02:28PM Shiraz Powers     Test Name Result Flag Reference   MISCELLANEOUS COMMENT (Report)     MERLIN TRANSMISSION: BATTERY VOLTAGE ADEQUATE (2 6 YRS)  AP 99% BVP >99%  ALL AVAILABLE LEAD PARAMETERS WITHIN NORMAL LIMITS  NO SIGNIFICANT HIGH RATE EPISODES  CORVUE IMPEDANCE MONITORING WITHIN NORMAL LIMITS  CORVUE IMPEDANCE THRESHOLD CROSSED 4/14 - 4/26 WHILE PT HAD "A COLD" BUT NOW WNL  APPROPRIATELY FUNCTIONING BIV ICD  CP   Cardiac Electrophysiology Report      [id number]  pdf   DEVICE TYPE CRT-D       Cardiac Electrophysiology Report [id number] 02:28PM Shiraz Powers     Test Name Result Flag Reference   Cardiac Electrophysiology Report      [id number]  pdf     Signatures   Electronically signed by : Wai Rios, ; May  8 2017 11:18AM EST                       (Author)    Electronically signed by : CHRIS Valdez ; May 13 2017  2:54PM EST                       (Author)    " [facility]

## 2018-09-15 NOTE — ED PROVIDER NOTE - PROGRESS NOTE DETAILS
Message left for Dr. Mora ([phone]), regarding CT chest results. Spoke with Dr. Mora, pt was sent for CT chest angio to r/o aneurysm, found to have filling defect RUL, LLL chronic PE

## 2018-09-15 NOTE — ED PROVIDER NOTE - OBJECTIVE STATEMENT
79 y/o F pt with PMHx of breast CA s/p lumpectomy (13 years ago), kidney stones, kidney stent (scheduled to be removed next week, DVT, HTN and PSHx of R TKA sent to ED by PMD for abnormal CT finding. Pt went to her PMD yesterday for medical clearance for removal of kidney stent and was told CT reveal abnormal finding in chest. Pt was originally scheduled for CT after CXR showed abnormal finding. Patient denies shortness of breath, chest pain, calf pain, or any other complaints. ALLERGIES: penicillin. PMD contact: [phone].

## 2018-09-15 NOTE — H&P ADULT - FAMILY HISTORY
Cerebrovascular accident     Family history of Parkinson's disease     Child  Still living? Yes, Estimated age: 41-50  Family history of kidney stones, Age at diagnosis: Age Unknown

## 2018-09-15 NOTE — H&P ADULT - HISTORY OF PRESENT ILLNESS
77 y/o F pt with PMHx of breast CA s/p lumpectomy (13 years ago), kidney stones, kidney stent (scheduled to be removed next week) bilateral DVT, HTN and PSHx of R TKA sent to ED by primary medical doctor due to abnormal CTA chest finding of pulmonary emboslim.. Pt went to her PMD yesterday for medical clearance for removal of kidney stent and was told CT reveal abnormal finding in chest. Pt was originally scheduled for CT after CXR showed abnormal finding. Per Dr. Mcclain who contacted PMD, patient has stable left lower lobe pulmonary embolism, however now has new left upper lobe pulmonary embolism as well. Patient was called by PMD to come into ED for treatment. Patient denies shortness of breath, chest pain, calf pain, or any other complaints.

## 2018-09-15 NOTE — H&P ADULT - NEUROLOGICAL DETAILS
responds to pain/responds to verbal commands/alert and oriented x 3/sensation intact/normal strength

## 2018-09-15 NOTE — ED PROVIDER NOTE - PSH
History of cystoscopy  bilateral ureteral stent placement  History of left cataract extraction  2010  History of lumpectomy of right breast  1/2005  History of right cataract extraction  2017  History of total knee replacement, right

## 2018-09-15 NOTE — ED PROVIDER NOTE - PMH
Breast cancer, right  radiation therapy and chemotherapy  Calculus in bladder    Calculus, ureter    DVT (deep venous thrombosis)  right and left lower extremity  HTN (hypertension)    Kidney stones

## 2018-09-15 NOTE — H&P ADULT - PROBLEM SELECTOR PLAN 1
s/p full dose lovenox  will continue with heparin drip for now  primary team to obtain outpatient CTA reports from [place] / PCP

## 2018-09-15 NOTE — H&P ADULT - NSHPLABSRESULTS_GEN_ALL_CORE
LABS:      CBC Full  -  ( 15 Sep 2018 12:35 )  WBC Count : 9.4 K/uL  Hemoglobin : 12.5 g/dL  Hematocrit : 38.5 %  Platelet Count - Automated : 227 K/uL  Mean Cell Volume : 88.1 fl  Mean Cell Hemoglobin : 28.7 pg  Mean Cell Hemoglobin Concentration : 32.6 gm/dL  Auto Neutrophil # : 6.8 K/uL  Auto Lymphocyte # : 1.4 K/uL  Auto Monocyte # : 0.7 K/uL  Auto Eosinophil # : 0.4 K/uL  Auto Basophil # : 0.1 K/uL  Auto Neutrophil % : 72.1 %  Auto Lymphocyte % : 15.1 %  Auto Monocyte % : 7.2 %  Auto Eosinophil % : 4.4 %  Auto Basophil % : 1.3 %    09-15    x   |  x   |  x   ----------------------------<  x   x    |  x   |  1.50<H>    Ca    9.2      15 Sep 2018 12:35    TPro  7.8  /  Alb  3.6  /  TBili  0.5  /  DBili  x   /  AST  46<H>  /  ALT  29  /  AlkPhos  90  09-15    PT/INR - ( 15 Sep 2018 12:55 )   PT: 10.9 sec;   INR: 1.00 ratio         PTT - ( 15 Sep 2018 12:55 )  PTT:28.6 sec

## 2018-09-16 ENCOUNTER — TRANSCRIPTION ENCOUNTER (OUTPATIENT)
Age: 78
End: 2018-09-16

## 2018-09-16 PROBLEM — N21.0 CALCULUS IN BLADDER: Chronic | Status: ACTIVE | Noted: 2018-09-13

## 2018-09-16 PROBLEM — C50.911 MALIGNANT NEOPLASM OF UNSPECIFIED SITE OF RIGHT FEMALE BREAST: Chronic | Status: ACTIVE | Noted: 2018-09-13

## 2018-09-16 PROBLEM — N20.1 CALCULUS OF URETER: Chronic | Status: ACTIVE | Noted: 2018-09-13

## 2018-09-16 LAB
24R-OH-CALCIDIOL SERPL-MCNC: 18.5 NG/ML — LOW (ref 30–80)
ANION GAP SERPL CALC-SCNC: 8 MMOL/L — SIGNIFICANT CHANGE UP (ref 5–17)
APPEARANCE UR: ABNORMAL
APTT BLD: 79.3 SEC — HIGH (ref 27.5–37.4)
BILIRUB UR-MCNC: NEGATIVE — SIGNIFICANT CHANGE UP
BUN SERPL-MCNC: 30 MG/DL — HIGH (ref 7–18)
CALCIUM SERPL-MCNC: 9.2 MG/DL — SIGNIFICANT CHANGE UP (ref 8.4–10.5)
CHLORIDE SERPL-SCNC: 105 MMOL/L — SIGNIFICANT CHANGE UP (ref 96–108)
CO2 SERPL-SCNC: 29 MMOL/L — SIGNIFICANT CHANGE UP (ref 22–31)
COLOR SPEC: ABNORMAL
CREAT SERPL-MCNC: 1.53 MG/DL — HIGH (ref 0.5–1.3)
DIFF PNL FLD: ABNORMAL
GLUCOSE SERPL-MCNC: 97 MG/DL — SIGNIFICANT CHANGE UP (ref 70–99)
GLUCOSE UR QL: NEGATIVE — SIGNIFICANT CHANGE UP
HCT VFR BLD CALC: 37.1 % — SIGNIFICANT CHANGE UP (ref 34.5–45)
HCT VFR BLD CALC: 37.4 % — SIGNIFICANT CHANGE UP (ref 34.5–45)
HCT VFR BLD CALC: 40.7 % — SIGNIFICANT CHANGE UP (ref 34.5–45)
HGB BLD-MCNC: 12 G/DL — SIGNIFICANT CHANGE UP (ref 11.5–15.5)
HGB BLD-MCNC: 12.4 G/DL — SIGNIFICANT CHANGE UP (ref 11.5–15.5)
HGB BLD-MCNC: 13 G/DL — SIGNIFICANT CHANGE UP (ref 11.5–15.5)
KETONES UR-MCNC: NEGATIVE — SIGNIFICANT CHANGE UP
LEUKOCYTE ESTERASE UR-ACNC: ABNORMAL
MAGNESIUM SERPL-MCNC: 2.3 MG/DL — SIGNIFICANT CHANGE UP (ref 1.6–2.6)
MCHC RBC-ENTMCNC: 28.4 PG — SIGNIFICANT CHANGE UP (ref 27–34)
MCHC RBC-ENTMCNC: 28.8 PG — SIGNIFICANT CHANGE UP (ref 27–34)
MCHC RBC-ENTMCNC: 29.1 PG — SIGNIFICANT CHANGE UP (ref 27–34)
MCHC RBC-ENTMCNC: 32.1 GM/DL — SIGNIFICANT CHANGE UP (ref 32–36)
MCHC RBC-ENTMCNC: 32.2 GM/DL — SIGNIFICANT CHANGE UP (ref 32–36)
MCHC RBC-ENTMCNC: 33.1 GM/DL — SIGNIFICANT CHANGE UP (ref 32–36)
MCV RBC AUTO: 88 FL — SIGNIFICANT CHANGE UP (ref 80–100)
MCV RBC AUTO: 88.1 FL — SIGNIFICANT CHANGE UP (ref 80–100)
MCV RBC AUTO: 89.6 FL — SIGNIFICANT CHANGE UP (ref 80–100)
NITRITE UR-MCNC: NEGATIVE — SIGNIFICANT CHANGE UP
PH UR: 5 — SIGNIFICANT CHANGE UP (ref 5–8)
PHOSPHATE SERPL-MCNC: 4.1 MG/DL — SIGNIFICANT CHANGE UP (ref 2.5–4.5)
PLATELET # BLD AUTO: 202 K/UL — SIGNIFICANT CHANGE UP (ref 150–400)
PLATELET # BLD AUTO: 209 K/UL — SIGNIFICANT CHANGE UP (ref 150–400)
PLATELET # BLD AUTO: 233 K/UL — SIGNIFICANT CHANGE UP (ref 150–400)
POTASSIUM SERPL-MCNC: 4.2 MMOL/L — SIGNIFICANT CHANGE UP (ref 3.5–5.3)
POTASSIUM SERPL-SCNC: 4.2 MMOL/L — SIGNIFICANT CHANGE UP (ref 3.5–5.3)
PROT UR-MCNC: 500 MG/DL
RBC # BLD: 4.21 M/UL — SIGNIFICANT CHANGE UP (ref 3.8–5.2)
RBC # BLD: 4.25 M/UL — SIGNIFICANT CHANGE UP (ref 3.8–5.2)
RBC # BLD: 4.54 M/UL — SIGNIFICANT CHANGE UP (ref 3.8–5.2)
RBC # FLD: 13 % — SIGNIFICANT CHANGE UP (ref 10.3–14.5)
RBC # FLD: 13.1 % — SIGNIFICANT CHANGE UP (ref 10.3–14.5)
RBC # FLD: 13.3 % — SIGNIFICANT CHANGE UP (ref 10.3–14.5)
SODIUM SERPL-SCNC: 142 MMOL/L — SIGNIFICANT CHANGE UP (ref 135–145)
SP GR SPEC: 1.02 — SIGNIFICANT CHANGE UP (ref 1.01–1.02)
TSH SERPL-MCNC: 1.12 UU/ML — SIGNIFICANT CHANGE UP (ref 0.34–4.82)
UROBILINOGEN FLD QL: NEGATIVE — SIGNIFICANT CHANGE UP
WBC # BLD: 10.1 K/UL — SIGNIFICANT CHANGE UP (ref 3.8–10.5)
WBC # BLD: 9 K/UL — SIGNIFICANT CHANGE UP (ref 3.8–10.5)
WBC # BLD: 9.5 K/UL — SIGNIFICANT CHANGE UP (ref 3.8–10.5)
WBC # FLD AUTO: 10.1 K/UL — SIGNIFICANT CHANGE UP (ref 3.8–10.5)
WBC # FLD AUTO: 9 K/UL — SIGNIFICANT CHANGE UP (ref 3.8–10.5)
WBC # FLD AUTO: 9.5 K/UL — SIGNIFICANT CHANGE UP (ref 3.8–10.5)

## 2018-09-16 RX ADMIN — Medication 1 MILLIGRAM(S): at 12:58

## 2018-09-16 RX ADMIN — Medication 25 MILLIGRAM(S): at 17:49

## 2018-09-16 RX ADMIN — Medication 1 TABLET(S): at 12:57

## 2018-09-16 RX ADMIN — Medication 25 MILLIGRAM(S): at 06:17

## 2018-09-16 RX ADMIN — Medication 1000 UNIT(S): at 13:00

## 2018-09-16 RX ADMIN — HEPARIN SODIUM 1800 UNIT(S)/HR: 5000 INJECTION INTRAVENOUS; SUBCUTANEOUS at 02:10

## 2018-09-16 RX ADMIN — SEVELAMER CARBONATE 800 MILLIGRAM(S): 2400 POWDER, FOR SUSPENSION ORAL at 15:21

## 2018-09-16 RX ADMIN — SEVELAMER CARBONATE 800 MILLIGRAM(S): 2400 POWDER, FOR SUSPENSION ORAL at 21:28

## 2018-09-16 RX ADMIN — HEPARIN SODIUM 1800 UNIT(S)/HR: 5000 INJECTION INTRAVENOUS; SUBCUTANEOUS at 20:28

## 2018-09-16 RX ADMIN — SEVELAMER CARBONATE 800 MILLIGRAM(S): 2400 POWDER, FOR SUSPENSION ORAL at 06:22

## 2018-09-16 NOTE — ED ADULT NURSE REASSESSMENT NOTE - NS ED NURSE REASSESS COMMENT FT1
Pt. is on Heparin drip at 18ml/hr. No complaints voiced. No signs of distress noted. Endorsed to [facility] Alba to draw next PTT/INR.

## 2018-09-16 NOTE — CHART NOTE - NSCHARTNOTEFT_GEN_A_CORE
Patient admitted for melissa PE, on heparin drip. Pt reports blood in urine. + urine dark brown. Urinalysis with + large blood.   H/H stable 12/37. No other signs of bleeding. VS stable. Will monitor CBC q 6 hrs. D/w Dr Oquendo.

## 2018-09-16 NOTE — PATIENT PROFILE ADULT. - VISION (WITH CORRECTIVE LENSES IF THE PATIENT USUALLY WEARS THEM):
Normal vision: sees adequately in most situations; can see medication labels, newsprint with glassess/Normal vision: sees adequately in most situations; can see medication labels, newsprint

## 2018-09-17 ENCOUNTER — TRANSCRIPTION ENCOUNTER (OUTPATIENT)
Age: 78
End: 2018-09-17

## 2018-09-17 DIAGNOSIS — R31.9 HEMATURIA, UNSPECIFIED: ICD-10-CM

## 2018-09-17 DIAGNOSIS — N20.1 CALCULUS OF URETER: ICD-10-CM

## 2018-09-17 LAB
ANION GAP SERPL CALC-SCNC: 8 MMOL/L — SIGNIFICANT CHANGE UP (ref 5–17)
APTT BLD: 140 SEC — CRITICAL HIGH (ref 27.5–37.4)
APTT BLD: 29.6 SEC — SIGNIFICANT CHANGE UP (ref 27.5–37.4)
APTT BLD: 53.9 SEC — HIGH (ref 27.5–37.4)
BUN SERPL-MCNC: 28 MG/DL — HIGH (ref 7–18)
CALCIUM SERPL-MCNC: 8.7 MG/DL — SIGNIFICANT CHANGE UP (ref 8.4–10.5)
CHLORIDE SERPL-SCNC: 104 MMOL/L — SIGNIFICANT CHANGE UP (ref 96–108)
CO2 SERPL-SCNC: 29 MMOL/L — SIGNIFICANT CHANGE UP (ref 22–31)
CREAT SERPL-MCNC: 1.53 MG/DL — HIGH (ref 0.5–1.3)
CULTURE RESULTS: SIGNIFICANT CHANGE UP
GLUCOSE BLDC GLUCOMTR-MCNC: 110 MG/DL — HIGH (ref 70–99)
GLUCOSE SERPL-MCNC: 96 MG/DL — SIGNIFICANT CHANGE UP (ref 70–99)
HCT VFR BLD CALC: 35.5 % — SIGNIFICANT CHANGE UP (ref 34.5–45)
HCT VFR BLD CALC: 36.9 % — SIGNIFICANT CHANGE UP (ref 34.5–45)
HGB BLD-MCNC: 11.6 G/DL — SIGNIFICANT CHANGE UP (ref 11.5–15.5)
HGB BLD-MCNC: 12 G/DL — SIGNIFICANT CHANGE UP (ref 11.5–15.5)
INR BLD: 1.08 RATIO — SIGNIFICANT CHANGE UP (ref 0.88–1.16)
MAGNESIUM SERPL-MCNC: 2.2 MG/DL — SIGNIFICANT CHANGE UP (ref 1.6–2.6)
MCHC RBC-ENTMCNC: 28.7 PG — SIGNIFICANT CHANGE UP (ref 27–34)
MCHC RBC-ENTMCNC: 28.8 PG — SIGNIFICANT CHANGE UP (ref 27–34)
MCHC RBC-ENTMCNC: 32.6 GM/DL — SIGNIFICANT CHANGE UP (ref 32–36)
MCHC RBC-ENTMCNC: 32.7 GM/DL — SIGNIFICANT CHANGE UP (ref 32–36)
MCV RBC AUTO: 88 FL — SIGNIFICANT CHANGE UP (ref 80–100)
MCV RBC AUTO: 88 FL — SIGNIFICANT CHANGE UP (ref 80–100)
PHOSPHATE SERPL-MCNC: 3.4 MG/DL — SIGNIFICANT CHANGE UP (ref 2.5–4.5)
PLATELET # BLD AUTO: 197 K/UL — SIGNIFICANT CHANGE UP (ref 150–400)
PLATELET # BLD AUTO: 209 K/UL — SIGNIFICANT CHANGE UP (ref 150–400)
POTASSIUM SERPL-MCNC: 4.1 MMOL/L — SIGNIFICANT CHANGE UP (ref 3.5–5.3)
POTASSIUM SERPL-SCNC: 4.1 MMOL/L — SIGNIFICANT CHANGE UP (ref 3.5–5.3)
PROTHROM AB SERPL-ACNC: 11.8 SEC — SIGNIFICANT CHANGE UP (ref 9.8–12.7)
RBC # BLD: 4.03 M/UL — SIGNIFICANT CHANGE UP (ref 3.8–5.2)
RBC # BLD: 4.19 M/UL — SIGNIFICANT CHANGE UP (ref 3.8–5.2)
RBC # FLD: 12.8 % — SIGNIFICANT CHANGE UP (ref 10.3–14.5)
RBC # FLD: 13 % — SIGNIFICANT CHANGE UP (ref 10.3–14.5)
SODIUM SERPL-SCNC: 141 MMOL/L — SIGNIFICANT CHANGE UP (ref 135–145)
SPECIMEN SOURCE: SIGNIFICANT CHANGE UP
WBC # BLD: 8.1 K/UL — SIGNIFICANT CHANGE UP (ref 3.8–10.5)
WBC # BLD: 9.1 K/UL — SIGNIFICANT CHANGE UP (ref 3.8–10.5)
WBC # FLD AUTO: 8.1 K/UL — SIGNIFICANT CHANGE UP (ref 3.8–10.5)
WBC # FLD AUTO: 9.1 K/UL — SIGNIFICANT CHANGE UP (ref 3.8–10.5)

## 2018-09-17 PROCEDURE — 99222 1ST HOSP IP/OBS MODERATE 55: CPT

## 2018-09-17 RX ORDER — HEPARIN SODIUM 5000 [USP'U]/ML
1000 INJECTION INTRAVENOUS; SUBCUTANEOUS
Qty: 25000 | Refills: 0 | Status: DISCONTINUED | OUTPATIENT
Start: 2018-09-17 | End: 2018-09-18

## 2018-09-17 RX ORDER — HYDROMORPHONE HYDROCHLORIDE 2 MG/ML
0.25 INJECTION INTRAMUSCULAR; INTRAVENOUS; SUBCUTANEOUS
Qty: 0 | Refills: 0 | Status: DISCONTINUED | OUTPATIENT
Start: 2018-09-17 | End: 2018-09-17

## 2018-09-17 RX ORDER — HEPARIN SODIUM 5000 [USP'U]/ML
8000 INJECTION INTRAVENOUS; SUBCUTANEOUS EVERY 6 HOURS
Qty: 0 | Refills: 0 | Status: DISCONTINUED | OUTPATIENT
Start: 2018-09-17 | End: 2018-09-20

## 2018-09-17 RX ORDER — DOCUSATE SODIUM 100 MG
100 CAPSULE ORAL DAILY
Qty: 0 | Refills: 0 | Status: DISCONTINUED | OUTPATIENT
Start: 2018-09-17 | End: 2018-09-17

## 2018-09-17 RX ORDER — SENNA PLUS 8.6 MG/1
2 TABLET ORAL AT BEDTIME
Qty: 0 | Refills: 0 | Status: DISCONTINUED | OUTPATIENT
Start: 2018-09-17 | End: 2018-09-19

## 2018-09-17 RX ORDER — DOCUSATE SODIUM 100 MG
100 CAPSULE ORAL DAILY
Qty: 0 | Refills: 0 | Status: DISCONTINUED | OUTPATIENT
Start: 2018-09-17 | End: 2018-09-19

## 2018-09-17 RX ORDER — MULTIVIT-MIN/FERROUS GLUCONATE 9 MG/15 ML
1 LIQUID (ML) ORAL DAILY
Qty: 0 | Refills: 0 | Status: DISCONTINUED | OUTPATIENT
Start: 2018-09-17 | End: 2018-09-19

## 2018-09-17 RX ORDER — SENNA PLUS 8.6 MG/1
2 TABLET ORAL AT BEDTIME
Qty: 0 | Refills: 0 | Status: DISCONTINUED | OUTPATIENT
Start: 2018-09-17 | End: 2018-09-17

## 2018-09-17 RX ORDER — FOLIC ACID 0.8 MG
1 TABLET ORAL DAILY
Qty: 0 | Refills: 0 | Status: DISCONTINUED | OUTPATIENT
Start: 2018-09-17 | End: 2018-09-19

## 2018-09-17 RX ORDER — TIOTROPIUM BROMIDE 18 UG/1
1 CAPSULE ORAL; RESPIRATORY (INHALATION) DAILY
Qty: 0 | Refills: 0 | Status: DISCONTINUED | OUTPATIENT
Start: 2018-09-17 | End: 2018-09-19

## 2018-09-17 RX ORDER — SODIUM CHLORIDE 9 MG/ML
1000 INJECTION, SOLUTION INTRAVENOUS
Qty: 0 | Refills: 0 | Status: DISCONTINUED | OUTPATIENT
Start: 2018-09-17 | End: 2018-09-17

## 2018-09-17 RX ORDER — CIPROFLOXACIN LACTATE 400MG/40ML
VIAL (ML) INTRAVENOUS
Qty: 0 | Refills: 0 | Status: DISCONTINUED | OUTPATIENT
Start: 2018-09-17 | End: 2018-09-17

## 2018-09-17 RX ORDER — HEPARIN SODIUM 5000 [USP'U]/ML
INJECTION INTRAVENOUS; SUBCUTANEOUS
Qty: 25000 | Refills: 0 | Status: DISCONTINUED | OUTPATIENT
Start: 2018-09-17 | End: 2018-09-17

## 2018-09-17 RX ORDER — CHOLECALCIFEROL (VITAMIN D3) 125 MCG
1000 CAPSULE ORAL DAILY
Qty: 0 | Refills: 0 | Status: DISCONTINUED | OUTPATIENT
Start: 2018-09-17 | End: 2018-09-19

## 2018-09-17 RX ORDER — SODIUM CHLORIDE 9 MG/ML
1000 INJECTION, SOLUTION INTRAVENOUS
Qty: 0 | Refills: 0 | Status: DISCONTINUED | OUTPATIENT
Start: 2018-09-17 | End: 2018-09-21

## 2018-09-17 RX ORDER — METOPROLOL TARTRATE 50 MG
25 TABLET ORAL
Qty: 0 | Refills: 0 | Status: DISCONTINUED | OUTPATIENT
Start: 2018-09-17 | End: 2018-09-19

## 2018-09-17 RX ORDER — HEPARIN SODIUM 5000 [USP'U]/ML
4000 INJECTION INTRAVENOUS; SUBCUTANEOUS EVERY 6 HOURS
Qty: 0 | Refills: 0 | Status: DISCONTINUED | OUTPATIENT
Start: 2018-09-17 | End: 2018-09-20

## 2018-09-17 RX ORDER — SEVELAMER CARBONATE 2400 MG/1
800 POWDER, FOR SUSPENSION ORAL THREE TIMES A DAY
Qty: 0 | Refills: 0 | Status: DISCONTINUED | OUTPATIENT
Start: 2018-09-17 | End: 2018-09-19

## 2018-09-17 RX ADMIN — Medication 1 TABLET(S): at 12:17

## 2018-09-17 RX ADMIN — HEPARIN SODIUM 0 UNIT(S)/HR: 5000 INJECTION INTRAVENOUS; SUBCUTANEOUS at 07:07

## 2018-09-17 RX ADMIN — HEPARIN SODIUM 1500 UNIT(S)/HR: 5000 INJECTION INTRAVENOUS; SUBCUTANEOUS at 11:04

## 2018-09-17 RX ADMIN — Medication 1 MILLIGRAM(S): at 12:17

## 2018-09-17 RX ADMIN — HEPARIN SODIUM 1000 UNIT(S)/HR: 5000 INJECTION INTRAVENOUS; SUBCUTANEOUS at 20:39

## 2018-09-17 RX ADMIN — Medication 1000 UNIT(S): at 12:17

## 2018-09-17 RX ADMIN — SENNA PLUS 2 TABLET(S): 8.6 TABLET ORAL at 21:37

## 2018-09-17 RX ADMIN — Medication 25 MILLIGRAM(S): at 19:57

## 2018-09-17 RX ADMIN — Medication 25 MILLIGRAM(S): at 06:43

## 2018-09-17 RX ADMIN — SEVELAMER CARBONATE 800 MILLIGRAM(S): 2400 POWDER, FOR SUSPENSION ORAL at 06:43

## 2018-09-17 RX ADMIN — SEVELAMER CARBONATE 800 MILLIGRAM(S): 2400 POWDER, FOR SUSPENSION ORAL at 21:37

## 2018-09-17 NOTE — CONSULT NOTE ADULT - PROBLEM SELECTOR RECOMMENDATION 2
OR planning tomorrow for bilateral ureteral stent exchange and lithotripsy  hold heparin gtt 6 hours prior to operative procedure in AM  NPO after midnight, Preop labs pending

## 2018-09-17 NOTE — DISCHARGE NOTE ADULT - HOSPITAL COURSE
77 y/o F pt with PMHx of breast CA s/p lumpectomy (13 years ago), kidney stones, kidney stent (scheduled to be removed next week) bilateral DVT, HTN and PSHx of R TKA sent to ED by primary medical doctor due to abnormal CTA chest finding of pulmonary emboslim.. Pt went to her PMD yesterday for medical clearance for removal of kidney stent and was told CT reveal abnormal finding in chest. Pt was originally scheduled for CT after CXR showed abnormal finding. Per Dr. Mcclain who contacted PMD, patient has stable left lower lobe pulmonary embolism, however now has new left upper lobe pulmonary embolism as well. Patient was called by PMD to come into ED for treatment. Patient denies shortness of breath, chest pain, calf pain, or any other complaints. She was started on full dose Lovenox and then started on heparin drip. Surgery was consulted and they put IVC filter. CT Abdomen showed ureteral stones and stents. Dr Caldwell performed exchange stent replacement and lithotripsy. She should continue with anticoagulation for atleast 6 months. Given patient's improved clinical status and current hemodynamic stability, decision was made to discharge. Discussed with attending  Please refer to patient's complete medical chart with documents for a full hospital course, for this is only a brief summary. 77 y/o F pt with PMHx of breast CA s/p lumpectomy (13 years ago), kidney stones, kidney stent (scheduled to be removed next week) bilateral DVT, HTN and PSHx of R TKA sent to ED by primary medical doctor due to abnormal CTA chest finding of pulmonary emboslim.. Pt went to her PMD yesterday for medical clearance for removal of kidney stent and was told CT reveal abnormal finding in chest. Pt was originally scheduled for CT after CXR showed abnormal finding. Per Dr. Mcclain who contacted PMD, patient has stable left lower lobe pulmonary embolism, however now has new left upper lobe pulmonary embolism as well. Patient was called by PMD to come into ED for treatment. Patient denies shortness of breath, chest pain, calf pain, or any other complaints. Echo showed ----------- She was started on full dose Lovenox and then started on heparin drip. Surgery was consulted and they put IVC filter. CT Abdomen showed ureteral stones and stents. Dr Caldwell performed exchange stent replacement and lithotripsy. She should continue with anticoagulation for atleast 6 months. Given patient's improved clinical status and current hemodynamic stability, decision was made to discharge. Discussed with attending  Please refer to patient's complete medical chart with documents for a full hospital course, for this is only a brief summary. 79 y/o F pt with PMHx of breast CA s/p lumpectomy (13 years ago), kidney stones, kidney stent (scheduled to be removed next week) bilateral DVT, HTN and PSHx of R TKA sent to ED by primary medical doctor due to abnormal CTA chest finding of pulmonary emboslim.. Pt went to her PMD yesterday for medical clearance for removal of kidney stent and was told CT reveal abnormal finding in chest. Pt was originally scheduled for CT after CXR showed abnormal finding. Per Dr. Mcclain who contacted PMD, patient has stable left lower lobe pulmonary embolism, however now has new left upper lobe pulmonary embolism as well. Patient was called by PMD to come into ED for treatment. Patient denies shortness of breath, chest pain, calf pain, or any other complaints. Echo showed no  She was started on full dose Lovenox and then started on heparin drip. Surgery was consulted and they put IVC filter. CT Abdomen showed ureteral stones and stents. Dr Caldwell performed exchange stent replacement and lithotripsy. She should continue with anticoagulation for atleast 6 months. Given patient's improved clinical status and current hemodynamic stability, decision was made to discharge. Discussed with attending  Please refer to patient's complete medical chart with documents for a full hospital course, for this is only a brief summary.

## 2018-09-17 NOTE — DISCHARGE NOTE ADULT - MEDICATION SUMMARY - MEDICATIONS TO TAKE
I will START or STAY ON the medications listed below when I get home from the hospital:    Aspirin Enteric Coated 325 mg oral delayed release tablet  -- 1 tab(s) by mouth once a day  -- Indication: For Prevention of stroke    Eliquis 2.5 mg oral tablet  -- 1 tab(s) by mouth 2 times a day   -- Check with your doctor before becoming pregnant.  It is very important that you take or use this exactly as directed.  Do not skip doses or discontinue unless directed by your doctor.  Obtain medical advice before taking any non-prescription drugs as some may affect the action of this medication.    -- Indication: For Prevention of stroke    metoprolol tartrate 25 mg oral tablet  -- 1 tab(s) by mouth 2 times a day  -- Indication: For Hypertension    tiotropium 18 mcg inhalation capsule  -- 1 cap(s) inhaled once a day  -- Indication: For asthma    sevelamer hydrochloride 800 mg oral tablet  -- 1 tab(s) by mouth 3 times a day  -- Indication: For Chronic kidney disease    Centrum Adults oral tablet  -- 1 tab(s) by mouth once a day  -- Indication: For Suppliments    Vitamin D3 1000 intl units oral capsule  -- 1 cap(s) by mouth once a day  -- Indication: For Suppliments    folic acid 1 mg oral tablet  -- 1 tab(s) by mouth once a day  -- Indication: For Suppliments

## 2018-09-17 NOTE — DISCHARGE NOTE ADULT - CARE PLAN
Principal Discharge DX:	Pulmonary embolism  Goal:	Prevention of clot in lungs  Assessment and plan of treatment:	You came here with clot in your lungs shown in CT scan.  You were started on anticoagulation. Surgery was consulted and they put IVC filter. You have to take anti coagulation medication for atleast 6 months.  Follow up with your Primary medical doctor and continue the same medication regimen.  Secondary Diagnosis:	Hematuria  Assessment and plan of treatment:	You have hematuria due to kidney stones. CT Abdomen showed ureteral stones and stents. Dr Caldwell performed exchange stent replacement and lithotripsy.   Follow up with your Primary medical doctor and continue the same medication regimen.  Secondary Diagnosis:	HTN (hypertension)  Assessment and plan of treatment:	You have a history of hypertension. Your blood pressure has been controlled. Continue with your medications as prescribed. You are advised to eat DASH diet and weight loss. Please monitor your blood pressure daily, record it and take it to your primary doctor. Follow up with your Primary medical doctor.  Secondary Diagnosis:	DVT (deep venous thrombosis)  Assessment and plan of treatment:	You have a history of Bilateral DVT. U/S of extremities showed Right peroneal DVT. Follow up with your Primary medical doctor and continue the same medication regimen. Principal Discharge DX:	Pulmonary embolism  Goal:	Prevention of clot in lungs  Assessment and plan of treatment:	You came here with clot in your lungs shown in CT scan. ECHO showed------- You were started on anticoagulation. Surgery was consulted and they put IVC filter. You have to take anti coagulation medication for atleast 6 months.  Follow up with your Primary medical doctor and continue the same medication regimen.  Secondary Diagnosis:	Hematuria  Assessment and plan of treatment:	You have hematuria due to kidney stones. CT Abdomen showed ureteral stones and stents. Dr Caldwell performed exchange stent replacement and lithotripsy.   Follow up with your Primary medical doctor and continue the same medication regimen.  Secondary Diagnosis:	HTN (hypertension)  Assessment and plan of treatment:	You have a history of hypertension. Your blood pressure has been controlled. Continue with your medications as prescribed. You are advised to eat DASH diet and weight loss. Please monitor your blood pressure daily, record it and take it to your primary doctor. Follow up with your Primary medical doctor.  Secondary Diagnosis:	DVT (deep venous thrombosis)  Assessment and plan of treatment:	You have a history of Bilateral DVT. U/S of extremities showed Right peroneal DVT. Follow up with your Primary medical doctor and continue the same medication regimen. Principal Discharge DX:	Pulmonary embolism  Goal:	Prevention of clot in lungs  Assessment and plan of treatment:	You came here with clot in your lungs shown in CT scan. ECHO showed no strain on right heart. You were started on anticoagulation Eliquis. Surgery was consulted and they put IVC filter. You have to take anti coagulation medication for at least 6 months. Please come to see the physician if you notice severe bleeding. Follow up with your Primary medical doctor and continue the same medication regimen.    Follow up with your Primary medical doctor and continue the same medication regimen.  Secondary Diagnosis:	Hematuria  Assessment and plan of treatment:	You have hematuria due to kidney stones. CT Abdomen showed ureteral stones and stents. Dr Caldwell performed exchange stent replacement and lithotripsy.   Follow up with your Primary medical doctor and continue the same medication regimen.  Secondary Diagnosis:	HTN (hypertension)  Assessment and plan of treatment:	You have a history of hypertension. Your blood pressure has been controlled. Continue with your medications as prescribed. You are advised to eat DASH diet and weight loss. Please monitor your blood pressure daily, record it and take it to your primary doctor. Follow up with your Primary medical doctor.  Secondary Diagnosis:	DVT (deep venous thrombosis)  Assessment and plan of treatment:	You have a history of Bilateral DVT. U/S of extremities showed Right peroneal DVT. Follow up with your Primary medical doctor and continue the same medication regimen. Principal Discharge DX:	Pulmonary embolism  Goal:	Prevention of clot in lungs  Assessment and plan of treatment:	You came here with clot in your lungs shown in CT scan. ECHO showed no strain on right heart. You were started on anticoagulation Eliquis. Surgery was consulted and they put IVC filter. You have to take anti coagulation medication for at least 6 months. Please come to see the physician if you notice severe bleeding. Follow up with your Primary medical doctor and continue the same medication regimen.  Follow up with your Primary medical doctor and continue the same medication regimen.  Secondary Diagnosis:	Hematuria  Assessment and plan of treatment:	You have hematuria due to kidney stones. CT Abdomen showed ureteral stones and stents. Dr Caldwell performed exchange stent replacement and lithotripsy. Follow up with your Primary medical doctor and continue the same medication regimen.  Secondary Diagnosis:	HTN (hypertension)  Assessment and plan of treatment:	You have a history of hypertension. Your blood pressure has been controlled. Continue with your medications as prescribed. You are advised to eat DASH diet and weight loss. Please monitor your blood pressure daily, record it and take it to your primary doctor. Follow up with your Primary medical doctor.  Secondary Diagnosis:	DVT (deep venous thrombosis)  Assessment and plan of treatment:	You have a history of Bilateral DVT. U/S of extremities showed Right peroneal DVT. Follow up with your Primary medical doctor and continue the same medication regimen.

## 2018-09-17 NOTE — BRIEF OPERATIVE NOTE - PROCEDURE
<<-----Click on this checkbox to enter Procedure IVC filter placement  09/17/2018    Active  RODRIGO

## 2018-09-17 NOTE — CONSULT NOTE ADULT - ASSESSMENT
78 year old female with bilateral DVT and PE x2 with hematuria. Bilateral ureteral stents placed 7/28 due to bilateral ureteral calculi with PAUL.

## 2018-09-17 NOTE — DISCHARGE NOTE ADULT - PATIENT PORTAL LINK FT
You can access the [facility] Patient Portal, offered by [facility], by registering with the following website: [email or web address]

## 2018-09-17 NOTE — BRIEF OPERATIVE NOTE - PRE-OP DX
DVT (deep venous thrombosis)  09/17/2018    Active  Isaiah Yancey  Pulmonary embolism  09/17/2018    Active  Isaiah Yancey

## 2018-09-17 NOTE — PROGRESS NOTE ADULT - ASSESSMENT
Patient admitted due to concerns for PE obtained on CTA done as outpatient at [place], along with bilateral DVT found as outpatient at vascular clinic, treated with high dose aspirin.

## 2018-09-17 NOTE — DISCHARGE NOTE ADULT - PLAN OF CARE
Prevention of clot in lungs You came here with clot in your lungs shown in CT scan.  You were started on anticoagulation. Surgery was consulted and they put IVC filter. You have to take anti coagulation medication for atleast 6 months.  Follow up with your Primary medical doctor and continue the same medication regimen. You have hematuria due to kidney stones. CT Abdomen showed ureteral stones and stents. Dr Caldwell performed exchange stent replacement and lithotripsy.   Follow up with your Primary medical doctor and continue the same medication regimen. You have a history of hypertension. Your blood pressure has been controlled. Continue with your medications as prescribed. You are advised to eat DASH diet and weight loss. Please monitor your blood pressure daily, record it and take it to your primary doctor. Follow up with your Primary medical doctor. You have a history of Bilateral DVT. U/S of extremities showed Right peroneal DVT. Follow up with your Primary medical doctor and continue the same medication regimen. You came here with clot in your lungs shown in CT scan. ECHO showed------- You were started on anticoagulation. Surgery was consulted and they put IVC filter. You have to take anti coagulation medication for atleast 6 months.  Follow up with your Primary medical doctor and continue the same medication regimen. You came here with clot in your lungs shown in CT scan. ECHO showed no strain on right heart. You were started on anticoagulation Eliquis. Surgery was consulted and they put IVC filter. You have to take anti coagulation medication for at least 6 months. Please come to see the physician if you notice severe bleeding. Follow up with your Primary medical doctor and continue the same medication regimen.    Follow up with your Primary medical doctor and continue the same medication regimen. You came here with clot in your lungs shown in CT scan. ECHO showed no strain on right heart. You were started on anticoagulation Eliquis. Surgery was consulted and they put IVC filter. You have to take anti coagulation medication for at least 6 months. Please come to see the physician if you notice severe bleeding. Follow up with your Primary medical doctor and continue the same medication regimen.  Follow up with your Primary medical doctor and continue the same medication regimen. You have hematuria due to kidney stones. CT Abdomen showed ureteral stones and stents. Dr Caldwell performed exchange stent replacement and lithotripsy. Follow up with your Primary medical doctor and continue the same medication regimen.

## 2018-09-17 NOTE — CONSULT NOTE ADULT - SUBJECTIVE AND OBJECTIVE BOX
HPI:  79 y/o F pt with PMHx of breast CA s/p lumpectomy (13 years ago), kidney stones, bilateral ureteral stents (placed 7/28) bilateral DVT, HTN and PSHx of R TKA sent to ED by primary medical doctor due to abnormal CTA chest finding of pulmonary emboslim.. Pt went to her PMD yesterday for medical clearance for removal of kidney stent and was told CT reveal abnormal finding in chest. Pt was originally scheduled for CT after CXR showed abnormal finding. Per Dr. Mcclain who contacted PMD, patient has stable left lower lobe pulmonary embolism, however now has new left upper lobe pulmonary embolism as well. Patient was called by PMD to come into ED for treatment. Patient denies shortness of breath, chest pain, calf pain, or any other complaints. (15 Sep 2018 19:38)    Patient seen and examined at bedside for vascular consult. Patient states that on her last admission she was found to have a peroneal DVT which was treated with only ASA 325mg. She states that as an outpatient she was diagnosed with having a DVT in her opposite leg as well, but was never started on full AC due to bilateral ureteral stents. CT as an outpatient showed stable LLL PE and new MERISSA PE. Admits to SOB with exertion only. Admits to hematuria when voiding, which has worsened since being on full dose heparin in the hospital. Denies chest pain, fever/ chills, dysuria, dizziness. Bilateral ureteral stents were placed 7/28 due to PAUL secondary to bilateral obstructing ureteral calculi.     PAST MEDICAL & SURGICAL HISTORY:  Breast cancer, right: radiation therapy and chemotherapy  DVT (deep venous thrombosis): right and left lower extremity  Calculus in bladder  Calculus, ureter  Kidney stones  HTN (hypertension)  History of total knee replacement, right  History of cystoscopy: bilateral ureteral stent placement  History of right cataract extraction: 2017  History of left cataract extraction: 2010  History of lumpectomy of right breast: 1/2005    Review of Systems: Contained within HPI    MEDICATIONS  (STANDING):  cholecalciferol 1000 Unit(s) Oral daily  folic acid 1 milliGRAM(s) Oral daily  metoprolol tartrate 25 milliGRAM(s) Oral two times a day  multivitamin/minerals 1 Tablet(s) Oral daily  sevelamer hydrochloride 800 milliGRAM(s) Oral three times a day  tiotropium 18 MICROgram(s) Capsule 1 Capsule(s) Inhalation daily    Allergies: penicillin (Rash)    SOCIAL HISTORY: +smoking history, quit many years ago. Denies ETOH and drug use    FAMILY HISTORY:  Family history of Parkinson's disease  Cerebrovascular accident  Family history of kidney stones (Child)    Vital Signs Last 24 Hrs  T(C): 36.8 (17 Sep 2018 11:25), Max: 37.1 (17 Sep 2018 05:20)  T(F): 98.2 (17 Sep 2018 11:25), Max: 98.8 (17 Sep 2018 05:20)  HR: 68 (17 Sep 2018 11:25) (57 - 73)  BP: 120/85 (17 Sep 2018 11:25) (120/85 - 160/80)  RR: 18 (17 Sep 2018 11:25) (14 - 18)  SpO2: 97% (17 Sep 2018 11:25) (96% - 99%)    Physical Exam:    General:  Appears stated age, well-groomed, well-nourished, no distress  Eyes: EOMI  HENT:  WNL, no JVD  Chest: respirations non labored  Abdomen:  soft, nontender  : no suprapubic tenderness, no CVA tenderness bilaterally  Extremities: bilateral lower extremity edema  Skin: warm and dry   Neuro:  Alert, oriented to time, place and person   Psych: normal affect    LABS:                        12.0   8.1   )-----------( 209      ( 17 Sep 2018 11:41 )             36.9     09-17    141  |  104  |  28<H>  ----------------------------<  96  4.1   |  29  |  1.53<H>    Ca    8.7      17 Sep 2018 06:16  Phos  3.4     09-17  Mg     2.2     09-17    TPro  7.8  /  Alb  3.6  /  TBili  0.5  /  DBili  x   /  AST  46<H>  /  ALT  29  /  AlkPhos  90  09-15    PT/INR - ( 17 Sep 2018 06:16 )   PT: 11.8 sec;   INR: 1.08 ratio      PTT - ( 17 Sep 2018 11:41 )  PTT:53.9 sec  Urinalysis Basic - ( 16 Sep 2018 12:39 )    Color: x / Appearance: x / SG: x / pH: x  Gluc: x / Ketone: x  / Bili: x / Urobili: x   Blood: x / Protein: x / Nitrite: x   Leuk Esterase: x / RBC: >50 /HPF / WBC 6-10 /HPF   Sq Epi: x / Non Sq Epi: x / Bacteria: Moderate /HPF    RADIOLOGY & ADDITIONAL STUDIES:  No recent imaging studies

## 2018-09-17 NOTE — CONSULT NOTE ADULT - ASSESSMENT
78 year old female with hematuria, bilateral ureteral stents placed 7/28 due to bilateral ureteral calculi with PAUL, which is now resolved. Bilateral DVT and PE x2

## 2018-09-17 NOTE — PROGRESS NOTE ADULT - ASSESSMENT
78yFemale with  Bilateral Ureteral and Urinary Bladder stones to go for Cystoscopy, bilateral ureteroscopy with laser lithotripsy, litholapaxy, bilateral ureteral stent exchange      -NPO after midnight   -IVF while NPO  -Hold hep gtt in AM  -Pre Op Labs in AM   -F/u medical clearance, please note in chart   -Consent to be obtained

## 2018-09-17 NOTE — CONSULT NOTE ADULT - PROBLEM SELECTOR RECOMMENDATION 2
Plan for IVC filter in OR today  NPO, heparin gtt on hold for procedure  will need full dose Anticoagulation x 6months

## 2018-09-17 NOTE — CONSULT NOTE ADULT - SUBJECTIVE AND OBJECTIVE BOX
HPI:  79 y/o F pt with PMHx of breast CA s/p lumpectomy (13 years ago), kidney stones, bilateral ureteral stents (placed 7/28) bilateral DVT, HTN and PSHx of R TKA sent to ED by primary medical doctor due to abnormal CTA chest finding of pulmonary emboslim.. Pt went to her PMD yesterday for medical clearance for removal of kidney stent and was told CT reveal abnormal finding in chest. Pt was originally scheduled for CT after CXR showed abnormal finding. Per Dr. Mcclain who contacted PMD, patient has stable left lower lobe pulmonary embolism, however now has new left upper lobe pulmonary embolism as well. Patient was called by PMD to come into ED for treatment. Patient denies shortness of breath, chest pain, calf pain, or any other complaints. (15 Sep 2018 19:38)    Patient seen and examined at bedside for urology consult. Patient states that on her last admission she was found to have a peroneal DVT which was treated with only ASA 325mg. She states that as an outpatient she was diagnosed with having a DVT in her opposite leg as well, but was never started on full AC due to bilateral ureteral stents. CT as an outpatient showed stable LLL PE and new MERISSA PE. Admits to SOB with exertion only. Admits to hematuria when voiding, which has worsened since being on full dose heparin in the hospital. Denies chest pain, fever/ chills, dysuria, dizziness. Bilateral ureteral stents were placed 7/28 due to PAUL secondary to bilateral obstructing ureteral calculi.     PAST MEDICAL & SURGICAL HISTORY:  Breast cancer, right: radiation therapy and chemotherapy  DVT (deep venous thrombosis): right and left lower extremity  Calculus in bladder  Calculus, ureter  Kidney stones  HTN (hypertension)  History of total knee replacement, right  History of cystoscopy: bilateral ureteral stent placement  History of right cataract extraction: 2017  History of left cataract extraction: 2010  History of lumpectomy of right breast: 1/2005    Review of Systems: Contained within HPI    MEDICATIONS  (STANDING):  cholecalciferol 1000 Unit(s) Oral daily  folic acid 1 milliGRAM(s) Oral daily  metoprolol tartrate 25 milliGRAM(s) Oral two times a day  multivitamin/minerals 1 Tablet(s) Oral daily  sevelamer hydrochloride 800 milliGRAM(s) Oral three times a day  tiotropium 18 MICROgram(s) Capsule 1 Capsule(s) Inhalation daily    Allergies: penicillin (Rash)    SOCIAL HISTORY: +smoking history, quit many years ago. Denies ETOH and drug use    FAMILY HISTORY:  Family history of Parkinson's disease  Cerebrovascular accident  Family history of kidney stones (Child)    Vital Signs Last 24 Hrs  T(C): 36.8 (17 Sep 2018 11:25), Max: 37.1 (17 Sep 2018 05:20)  T(F): 98.2 (17 Sep 2018 11:25), Max: 98.8 (17 Sep 2018 05:20)  HR: 68 (17 Sep 2018 11:25) (57 - 73)  BP: 120/85 (17 Sep 2018 11:25) (120/85 - 160/80)  RR: 18 (17 Sep 2018 11:25) (14 - 18)  SpO2: 97% (17 Sep 2018 11:25) (96% - 99%)    Physical Exam:    General:  Appears stated age, well-groomed, well-nourished, no distress  Eyes: EOMI  HENT:  WNL, no JVD  Chest: respirations non labored  Abdomen:  soft, nontender  : no suprapubic tenderness, no CVA tenderness bilaterally  Extremities: bilateral lower extremity edema  Skin: warm and dry   Neuro:  Alert, oriented to time, place and person   Psych: normal affect    LABS:                        12.0   8.1   )-----------( 209      ( 17 Sep 2018 11:41 )             36.9     09-17    141  |  104  |  28<H>  ----------------------------<  96  4.1   |  29  |  1.53<H>    Ca    8.7      17 Sep 2018 06:16  Phos  3.4     09-17  Mg     2.2     09-17    TPro  7.8  /  Alb  3.6  /  TBili  0.5  /  DBili  x   /  AST  46<H>  /  ALT  29  /  AlkPhos  90  09-15    PT/INR - ( 17 Sep 2018 06:16 )   PT: 11.8 sec;   INR: 1.08 ratio      PTT - ( 17 Sep 2018 11:41 )  PTT:53.9 sec  Urinalysis Basic - ( 16 Sep 2018 12:39 )    Color: x / Appearance: x / SG: x / pH: x  Gluc: x / Ketone: x  / Bili: x / Urobili: x   Blood: x / Protein: x / Nitrite: x   Leuk Esterase: x / RBC: >50 /HPF / WBC 6-10 /HPF   Sq Epi: x / Non Sq Epi: x / Bacteria: Moderate /HPF    RADIOLOGY & ADDITIONAL STUDIES:  No recent imaging studies

## 2018-09-17 NOTE — DISCHARGE NOTE ADULT - ADDITIONAL INSTRUCTIONS
Follow up with your Primary medical doctor and continue the same medication regimen.  Follow up with your urology doctor as out patient  Follow up with your vascular surgeon as outpatient

## 2018-09-17 NOTE — CONSULT NOTE ADULT - ATTENDING COMMENTS
spokw with dr dhaliwal will place ivc filter today   ok for melissa urs tomorrow   will hold heparin 6 hrs prior   reviewed risk of bleeding infection damage to bladder ureter with pt   as heparin will need to be restarted right after surgery increased risk for bleeding

## 2018-09-18 ENCOUNTER — APPOINTMENT (OUTPATIENT)
Dept: UROLOGY | Facility: HOSPITAL | Age: 78
End: 2018-09-18

## 2018-09-18 ENCOUNTER — RESULT REVIEW (OUTPATIENT)
Age: 78
End: 2018-09-18

## 2018-09-18 LAB
ANION GAP SERPL CALC-SCNC: 7 MMOL/L — SIGNIFICANT CHANGE UP (ref 5–17)
APTT BLD: 39.6 SEC — HIGH (ref 27.5–37.4)
APTT BLD: 59.2 SEC — HIGH (ref 27.5–37.4)
BUN SERPL-MCNC: 26 MG/DL — HIGH (ref 7–18)
CALCIUM SERPL-MCNC: 8.8 MG/DL — SIGNIFICANT CHANGE UP (ref 8.4–10.5)
CHLORIDE SERPL-SCNC: 103 MMOL/L — SIGNIFICANT CHANGE UP (ref 96–108)
CO2 SERPL-SCNC: 28 MMOL/L — SIGNIFICANT CHANGE UP (ref 22–31)
CREAT SERPL-MCNC: 1.8 MG/DL — HIGH (ref 0.5–1.3)
GLUCOSE SERPL-MCNC: 157 MG/DL — HIGH (ref 70–99)
HCT VFR BLD CALC: 34.4 % — LOW (ref 34.5–45)
HCT VFR BLD CALC: 35.2 % — SIGNIFICANT CHANGE UP (ref 34.5–45)
HCT VFR BLD CALC: 36 % — SIGNIFICANT CHANGE UP (ref 34.5–45)
HGB BLD-MCNC: 11.2 G/DL — LOW (ref 11.5–15.5)
HGB BLD-MCNC: 11.5 G/DL — SIGNIFICANT CHANGE UP (ref 11.5–15.5)
HGB BLD-MCNC: 11.7 G/DL — SIGNIFICANT CHANGE UP (ref 11.5–15.5)
INR BLD: 1.01 RATIO — SIGNIFICANT CHANGE UP (ref 0.88–1.16)
MCHC RBC-ENTMCNC: 28.8 PG — SIGNIFICANT CHANGE UP (ref 27–34)
MCHC RBC-ENTMCNC: 28.8 PG — SIGNIFICANT CHANGE UP (ref 27–34)
MCHC RBC-ENTMCNC: 28.9 PG — SIGNIFICANT CHANGE UP (ref 27–34)
MCHC RBC-ENTMCNC: 32.5 GM/DL — SIGNIFICANT CHANGE UP (ref 32–36)
MCHC RBC-ENTMCNC: 32.6 GM/DL — SIGNIFICANT CHANGE UP (ref 32–36)
MCHC RBC-ENTMCNC: 32.6 GM/DL — SIGNIFICANT CHANGE UP (ref 32–36)
MCV RBC AUTO: 88.1 FL — SIGNIFICANT CHANGE UP (ref 80–100)
MCV RBC AUTO: 88.5 FL — SIGNIFICANT CHANGE UP (ref 80–100)
MCV RBC AUTO: 88.7 FL — SIGNIFICANT CHANGE UP (ref 80–100)
PLATELET # BLD AUTO: 181 K/UL — SIGNIFICANT CHANGE UP (ref 150–400)
PLATELET # BLD AUTO: 187 K/UL — SIGNIFICANT CHANGE UP (ref 150–400)
PLATELET # BLD AUTO: 198 K/UL — SIGNIFICANT CHANGE UP (ref 150–400)
POTASSIUM SERPL-MCNC: 4.5 MMOL/L — SIGNIFICANT CHANGE UP (ref 3.5–5.3)
POTASSIUM SERPL-SCNC: 4.5 MMOL/L — SIGNIFICANT CHANGE UP (ref 3.5–5.3)
PROTHROM AB SERPL-ACNC: 11 SEC — SIGNIFICANT CHANGE UP (ref 9.8–12.7)
RBC # BLD: 3.88 M/UL — SIGNIFICANT CHANGE UP (ref 3.8–5.2)
RBC # BLD: 3.97 M/UL — SIGNIFICANT CHANGE UP (ref 3.8–5.2)
RBC # BLD: 4.08 M/UL — SIGNIFICANT CHANGE UP (ref 3.8–5.2)
RBC # FLD: 12.7 % — SIGNIFICANT CHANGE UP (ref 10.3–14.5)
RBC # FLD: 13 % — SIGNIFICANT CHANGE UP (ref 10.3–14.5)
RBC # FLD: 13 % — SIGNIFICANT CHANGE UP (ref 10.3–14.5)
SODIUM SERPL-SCNC: 138 MMOL/L — SIGNIFICANT CHANGE UP (ref 135–145)
WBC # BLD: 8.7 K/UL — SIGNIFICANT CHANGE UP (ref 3.8–10.5)
WBC # BLD: 8.9 K/UL — SIGNIFICANT CHANGE UP (ref 3.8–10.5)
WBC # BLD: 9.2 K/UL — SIGNIFICANT CHANGE UP (ref 3.8–10.5)
WBC # FLD AUTO: 8.7 K/UL — SIGNIFICANT CHANGE UP (ref 3.8–10.5)
WBC # FLD AUTO: 8.9 K/UL — SIGNIFICANT CHANGE UP (ref 3.8–10.5)
WBC # FLD AUTO: 9.2 K/UL — SIGNIFICANT CHANGE UP (ref 3.8–10.5)

## 2018-09-18 PROCEDURE — 88300 SURGICAL PATH GROSS: CPT | Mod: 26

## 2018-09-18 PROCEDURE — 52318 REMOVE BLADDER STONE: CPT | Mod: 59

## 2018-09-18 PROCEDURE — 52356 CYSTO/URETERO W/LITHOTRIPSY: CPT | Mod: 50

## 2018-09-18 RX ORDER — CIPROFLOXACIN LACTATE 400MG/40ML
VIAL (ML) INTRAVENOUS
Qty: 0 | Refills: 0 | Status: DISCONTINUED | OUTPATIENT
Start: 2018-09-18 | End: 2018-09-21

## 2018-09-18 RX ORDER — MULTIVIT-MIN/FERROUS GLUCONATE 9 MG/15 ML
1 LIQUID (ML) ORAL DAILY
Qty: 0 | Refills: 0 | Status: DISCONTINUED | OUTPATIENT
Start: 2018-09-18 | End: 2018-09-21

## 2018-09-18 RX ORDER — CIPROFLOXACIN LACTATE 400MG/40ML
400 VIAL (ML) INTRAVENOUS ONCE
Qty: 0 | Refills: 0 | Status: COMPLETED | OUTPATIENT
Start: 2018-09-18 | End: 2018-09-18

## 2018-09-18 RX ORDER — ONDANSETRON 8 MG/1
4 TABLET, FILM COATED ORAL EVERY 6 HOURS
Qty: 0 | Refills: 0 | Status: DISCONTINUED | OUTPATIENT
Start: 2018-09-18 | End: 2018-09-21

## 2018-09-18 RX ORDER — HEPARIN SODIUM 5000 [USP'U]/ML
INJECTION INTRAVENOUS; SUBCUTANEOUS
Qty: 25000 | Refills: 0 | Status: DISCONTINUED | OUTPATIENT
Start: 2018-09-18 | End: 2018-09-20

## 2018-09-18 RX ORDER — OXYCODONE AND ACETAMINOPHEN 5; 325 MG/1; MG/1
1 TABLET ORAL EVERY 4 HOURS
Qty: 0 | Refills: 0 | Status: DISCONTINUED | OUTPATIENT
Start: 2018-09-18 | End: 2018-09-21

## 2018-09-18 RX ORDER — DOCUSATE SODIUM 100 MG
100 CAPSULE ORAL DAILY
Qty: 0 | Refills: 0 | Status: DISCONTINUED | OUTPATIENT
Start: 2018-09-18 | End: 2018-09-21

## 2018-09-18 RX ORDER — SENNA PLUS 8.6 MG/1
2 TABLET ORAL AT BEDTIME
Qty: 0 | Refills: 0 | Status: DISCONTINUED | OUTPATIENT
Start: 2018-09-18 | End: 2018-09-21

## 2018-09-18 RX ORDER — SODIUM CHLORIDE 9 MG/ML
1000 INJECTION, SOLUTION INTRAVENOUS
Qty: 0 | Refills: 0 | Status: DISCONTINUED | OUTPATIENT
Start: 2018-09-18 | End: 2018-09-18

## 2018-09-18 RX ORDER — FOLIC ACID 0.8 MG
1 TABLET ORAL DAILY
Qty: 0 | Refills: 0 | Status: DISCONTINUED | OUTPATIENT
Start: 2018-09-18 | End: 2018-09-21

## 2018-09-18 RX ORDER — SEVELAMER CARBONATE 2400 MG/1
800 POWDER, FOR SUSPENSION ORAL THREE TIMES A DAY
Qty: 0 | Refills: 0 | Status: DISCONTINUED | OUTPATIENT
Start: 2018-09-18 | End: 2018-09-21

## 2018-09-18 RX ORDER — CHOLECALCIFEROL (VITAMIN D3) 125 MCG
1000 CAPSULE ORAL DAILY
Qty: 0 | Refills: 0 | Status: DISCONTINUED | OUTPATIENT
Start: 2018-09-18 | End: 2018-09-21

## 2018-09-18 RX ORDER — METOPROLOL TARTRATE 50 MG
25 TABLET ORAL
Qty: 0 | Refills: 0 | Status: DISCONTINUED | OUTPATIENT
Start: 2018-09-18 | End: 2018-09-21

## 2018-09-18 RX ORDER — CIPROFLOXACIN LACTATE 400MG/40ML
400 VIAL (ML) INTRAVENOUS EVERY 12 HOURS
Qty: 0 | Refills: 0 | Status: DISCONTINUED | OUTPATIENT
Start: 2018-09-19 | End: 2018-09-21

## 2018-09-18 RX ORDER — FENTANYL CITRATE 50 UG/ML
25 INJECTION INTRAVENOUS
Qty: 0 | Refills: 0 | Status: DISCONTINUED | OUTPATIENT
Start: 2018-09-18 | End: 2018-09-18

## 2018-09-18 RX ORDER — TIOTROPIUM BROMIDE 18 UG/1
1 CAPSULE ORAL; RESPIRATORY (INHALATION) DAILY
Qty: 0 | Refills: 0 | Status: DISCONTINUED | OUTPATIENT
Start: 2018-09-18 | End: 2018-09-21

## 2018-09-18 RX ADMIN — HEPARIN SODIUM 1200 UNIT(S)/HR: 5000 INJECTION INTRAVENOUS; SUBCUTANEOUS at 03:13

## 2018-09-18 RX ADMIN — SODIUM CHLORIDE 75 MILLILITER(S): 9 INJECTION, SOLUTION INTRAVENOUS at 00:42

## 2018-09-18 RX ADMIN — Medication 25 MILLIGRAM(S): at 05:36

## 2018-09-18 RX ADMIN — SENNA PLUS 2 TABLET(S): 8.6 TABLET ORAL at 21:21

## 2018-09-18 RX ADMIN — SEVELAMER CARBONATE 800 MILLIGRAM(S): 2400 POWDER, FOR SUSPENSION ORAL at 05:36

## 2018-09-18 RX ADMIN — ONDANSETRON 4 MILLIGRAM(S): 8 TABLET, FILM COATED ORAL at 17:01

## 2018-09-18 RX ADMIN — TIOTROPIUM BROMIDE 1 CAPSULE(S): 18 CAPSULE ORAL; RESPIRATORY (INHALATION) at 17:56

## 2018-09-18 RX ADMIN — HEPARIN SODIUM 4000 UNIT(S): 5000 INJECTION INTRAVENOUS; SUBCUTANEOUS at 03:16

## 2018-09-18 RX ADMIN — Medication 25 MILLIGRAM(S): at 18:00

## 2018-09-18 RX ADMIN — HEPARIN SODIUM 1800 UNIT(S)/HR: 5000 INJECTION INTRAVENOUS; SUBCUTANEOUS at 21:01

## 2018-09-18 RX ADMIN — Medication 200 MILLIGRAM(S): at 19:42

## 2018-09-18 RX ADMIN — SEVELAMER CARBONATE 800 MILLIGRAM(S): 2400 POWDER, FOR SUSPENSION ORAL at 21:21

## 2018-09-18 NOTE — BRIEF OPERATIVE NOTE - POST-OP DX
Bilateral hydronephrosis  09/18/2018    Active  Keyanna Garcia  DVT (deep venous thrombosis)  09/17/2018    Active  Isaiah Yancey  Pulmonary embolism  09/17/2018    Active  Isaiah Yancey

## 2018-09-18 NOTE — BRIEF OPERATIVE NOTE - PROCEDURE
<<-----Click on this checkbox to enter Procedure Cystoscopy and ureteroscopy with laser lithotripsy  09/18/2018  bilateral stent exchange, bladder stone lithotripsy and extraction of stone  Active  KRISTY

## 2018-09-18 NOTE — BRIEF OPERATIVE NOTE - OPERATION/FINDINGS
IVC filter placed (just distal to renal veins) via left femoral vein access.
bladder calculi, left ureteral stone

## 2018-09-18 NOTE — PROGRESS NOTE ADULT - ASSESSMENT
Patient admitted due to concerns for PE obtained on CTA done as outpatient at [facility], along with bilateral DVT found as outpatient at vascular clinic, treated with high dose aspirin.

## 2018-09-19 LAB
ANION GAP SERPL CALC-SCNC: 7 MMOL/L — SIGNIFICANT CHANGE UP (ref 5–17)
APTT BLD: 100.9 SEC — HIGH (ref 27.5–37.4)
APTT BLD: 122 SEC — CRITICAL HIGH (ref 27.5–37.4)
APTT BLD: 87.6 SEC — HIGH (ref 27.5–37.4)
APTT BLD: 91 SEC — HIGH (ref 27.5–37.4)
BASOPHILS # BLD AUTO: 0 K/UL — SIGNIFICANT CHANGE UP (ref 0–0.2)
BASOPHILS NFR BLD AUTO: 0.3 % — SIGNIFICANT CHANGE UP (ref 0–2)
BUN SERPL-MCNC: 26 MG/DL — HIGH (ref 7–18)
CALCIUM SERPL-MCNC: 8.9 MG/DL — SIGNIFICANT CHANGE UP (ref 8.4–10.5)
CHLORIDE SERPL-SCNC: 102 MMOL/L — SIGNIFICANT CHANGE UP (ref 96–108)
CO2 SERPL-SCNC: 29 MMOL/L — SIGNIFICANT CHANGE UP (ref 22–31)
CREAT SERPL-MCNC: 1.84 MG/DL — HIGH (ref 0.5–1.3)
EOSINOPHIL # BLD AUTO: 0 K/UL — SIGNIFICANT CHANGE UP (ref 0–0.5)
EOSINOPHIL NFR BLD AUTO: 0.3 % — SIGNIFICANT CHANGE UP (ref 0–6)
GLUCOSE BLDC GLUCOMTR-MCNC: 117 MG/DL — HIGH (ref 70–99)
GLUCOSE SERPL-MCNC: 134 MG/DL — HIGH (ref 70–99)
HCT VFR BLD CALC: 33.8 % — LOW (ref 34.5–45)
HCT VFR BLD CALC: 34.8 % — SIGNIFICANT CHANGE UP (ref 34.5–45)
HGB BLD-MCNC: 11 G/DL — LOW (ref 11.5–15.5)
HGB BLD-MCNC: 11.5 G/DL — SIGNIFICANT CHANGE UP (ref 11.5–15.5)
LYMPHOCYTES # BLD AUTO: 1.4 K/UL — SIGNIFICANT CHANGE UP (ref 1–3.3)
LYMPHOCYTES # BLD AUTO: 11.4 % — LOW (ref 13–44)
MAGNESIUM SERPL-MCNC: 2.1 MG/DL — SIGNIFICANT CHANGE UP (ref 1.6–2.6)
MCHC RBC-ENTMCNC: 28.4 PG — SIGNIFICANT CHANGE UP (ref 27–34)
MCHC RBC-ENTMCNC: 28.8 PG — SIGNIFICANT CHANGE UP (ref 27–34)
MCHC RBC-ENTMCNC: 32.4 GM/DL — SIGNIFICANT CHANGE UP (ref 32–36)
MCHC RBC-ENTMCNC: 33.1 GM/DL — SIGNIFICANT CHANGE UP (ref 32–36)
MCV RBC AUTO: 87 FL — SIGNIFICANT CHANGE UP (ref 80–100)
MCV RBC AUTO: 87.6 FL — SIGNIFICANT CHANGE UP (ref 80–100)
MONOCYTES # BLD AUTO: 0.7 K/UL — SIGNIFICANT CHANGE UP (ref 0–0.9)
MONOCYTES NFR BLD AUTO: 5.5 % — SIGNIFICANT CHANGE UP (ref 2–14)
NEUTROPHILS # BLD AUTO: 10.4 K/UL — HIGH (ref 1.8–7.4)
NEUTROPHILS NFR BLD AUTO: 82.6 % — HIGH (ref 43–77)
PHOSPHATE SERPL-MCNC: 3 MG/DL — SIGNIFICANT CHANGE UP (ref 2.5–4.5)
PLATELET # BLD AUTO: 185 K/UL — SIGNIFICANT CHANGE UP (ref 150–400)
PLATELET # BLD AUTO: 206 K/UL — SIGNIFICANT CHANGE UP (ref 150–400)
POTASSIUM SERPL-MCNC: 4.4 MMOL/L — SIGNIFICANT CHANGE UP (ref 3.5–5.3)
POTASSIUM SERPL-SCNC: 4.4 MMOL/L — SIGNIFICANT CHANGE UP (ref 3.5–5.3)
RBC # BLD: 3.85 M/UL — SIGNIFICANT CHANGE UP (ref 3.8–5.2)
RBC # BLD: 4 M/UL — SIGNIFICANT CHANGE UP (ref 3.8–5.2)
RBC # FLD: 12.7 % — SIGNIFICANT CHANGE UP (ref 10.3–14.5)
RBC # FLD: 13 % — SIGNIFICANT CHANGE UP (ref 10.3–14.5)
SODIUM SERPL-SCNC: 138 MMOL/L — SIGNIFICANT CHANGE UP (ref 135–145)
WBC # BLD: 12.6 K/UL — HIGH (ref 3.8–10.5)
WBC # BLD: 9.9 K/UL — SIGNIFICANT CHANGE UP (ref 3.8–10.5)
WBC # FLD AUTO: 12.6 K/UL — HIGH (ref 3.8–10.5)
WBC # FLD AUTO: 9.9 K/UL — SIGNIFICANT CHANGE UP (ref 3.8–10.5)

## 2018-09-19 PROCEDURE — 99232 SBSQ HOSP IP/OBS MODERATE 35: CPT

## 2018-09-19 PROCEDURE — 93970 EXTREMITY STUDY: CPT | Mod: 26

## 2018-09-19 RX ORDER — APIXABAN 2.5 MG/1
1 TABLET, FILM COATED ORAL
Qty: 14 | Refills: 0 | OUTPATIENT
Start: 2018-09-19 | End: 2018-09-25

## 2018-09-19 RX ORDER — FONDAPARINUX SODIUM 2.5 MG/.5ML
1 INJECTION, SOLUTION SUBCUTANEOUS
Qty: 42 | Refills: 0 | OUTPATIENT
Start: 2018-09-19 | End: 2018-10-09

## 2018-09-19 RX ADMIN — HEPARIN SODIUM 1800 UNIT(S)/HR: 5000 INJECTION INTRAVENOUS; SUBCUTANEOUS at 03:15

## 2018-09-19 RX ADMIN — Medication 200 MILLIGRAM(S): at 17:38

## 2018-09-19 RX ADMIN — SENNA PLUS 2 TABLET(S): 8.6 TABLET ORAL at 21:54

## 2018-09-19 RX ADMIN — SEVELAMER CARBONATE 800 MILLIGRAM(S): 2400 POWDER, FOR SUSPENSION ORAL at 14:03

## 2018-09-19 RX ADMIN — Medication 25 MILLIGRAM(S): at 17:38

## 2018-09-19 RX ADMIN — SEVELAMER CARBONATE 800 MILLIGRAM(S): 2400 POWDER, FOR SUSPENSION ORAL at 21:54

## 2018-09-19 RX ADMIN — SEVELAMER CARBONATE 800 MILLIGRAM(S): 2400 POWDER, FOR SUSPENSION ORAL at 06:52

## 2018-09-19 RX ADMIN — HEPARIN SODIUM 1400 UNIT(S)/HR: 5000 INJECTION INTRAVENOUS; SUBCUTANEOUS at 17:35

## 2018-09-19 RX ADMIN — Medication 1 TABLET(S): at 12:10

## 2018-09-19 RX ADMIN — Medication 1 MILLIGRAM(S): at 12:10

## 2018-09-19 RX ADMIN — Medication 100 MILLIGRAM(S): at 12:10

## 2018-09-19 RX ADMIN — TIOTROPIUM BROMIDE 1 CAPSULE(S): 18 CAPSULE ORAL; RESPIRATORY (INHALATION) at 12:11

## 2018-09-19 RX ADMIN — Medication 1000 UNIT(S): at 12:10

## 2018-09-19 RX ADMIN — HEPARIN SODIUM 1400 UNIT(S)/HR: 5000 INJECTION INTRAVENOUS; SUBCUTANEOUS at 23:41

## 2018-09-19 RX ADMIN — Medication 200 MILLIGRAM(S): at 06:52

## 2018-09-19 RX ADMIN — HEPARIN SODIUM 1600 UNIT(S)/HR: 5000 INJECTION INTRAVENOUS; SUBCUTANEOUS at 09:51

## 2018-09-19 NOTE — PHYSICAL THERAPY INITIAL EVALUATION ADULT - DIAGNOSIS, PT EVAL
Patient was Independent in transfers;however, was unable to ambulate outside the room as pt was connected to IV Heparin

## 2018-09-20 LAB
ANION GAP SERPL CALC-SCNC: 10 MMOL/L — SIGNIFICANT CHANGE UP (ref 5–17)
APTT BLD: >200 SEC — CRITICAL HIGH (ref 27.5–37.4)
BUN SERPL-MCNC: 30 MG/DL — HIGH (ref 7–18)
CALCIUM SERPL-MCNC: 8.3 MG/DL — LOW (ref 8.4–10.5)
CHLORIDE SERPL-SCNC: 101 MMOL/L — SIGNIFICANT CHANGE UP (ref 96–108)
CO2 SERPL-SCNC: 27 MMOL/L — SIGNIFICANT CHANGE UP (ref 22–31)
CREAT SERPL-MCNC: 1.86 MG/DL — HIGH (ref 0.5–1.3)
GLUCOSE BLDC GLUCOMTR-MCNC: 152 MG/DL — HIGH (ref 70–99)
GLUCOSE SERPL-MCNC: 217 MG/DL — HIGH (ref 70–99)
HCT VFR BLD CALC: 34.4 % — LOW (ref 34.5–45)
HGB BLD-MCNC: 11.2 G/DL — LOW (ref 11.5–15.5)
MCHC RBC-ENTMCNC: 28.8 PG — SIGNIFICANT CHANGE UP (ref 27–34)
MCHC RBC-ENTMCNC: 32.4 GM/DL — SIGNIFICANT CHANGE UP (ref 32–36)
MCV RBC AUTO: 88.7 FL — SIGNIFICANT CHANGE UP (ref 80–100)
PLATELET # BLD AUTO: 190 K/UL — SIGNIFICANT CHANGE UP (ref 150–400)
POTASSIUM SERPL-MCNC: 4 MMOL/L — SIGNIFICANT CHANGE UP (ref 3.5–5.3)
POTASSIUM SERPL-SCNC: 4 MMOL/L — SIGNIFICANT CHANGE UP (ref 3.5–5.3)
RBC # BLD: 3.88 M/UL — SIGNIFICANT CHANGE UP (ref 3.8–5.2)
RBC # FLD: 13 % — SIGNIFICANT CHANGE UP (ref 10.3–14.5)
SODIUM SERPL-SCNC: 138 MMOL/L — SIGNIFICANT CHANGE UP (ref 135–145)
WBC # BLD: 9.9 K/UL — SIGNIFICANT CHANGE UP (ref 3.8–10.5)
WBC # FLD AUTO: 9.9 K/UL — SIGNIFICANT CHANGE UP (ref 3.8–10.5)

## 2018-09-20 RX ORDER — APIXABAN 2.5 MG/1
2.5 TABLET, FILM COATED ORAL EVERY 12 HOURS
Qty: 0 | Refills: 0 | Status: DISCONTINUED | OUTPATIENT
Start: 2018-09-20 | End: 2018-09-21

## 2018-09-20 RX ORDER — APIXABAN 2.5 MG/1
5 TABLET, FILM COATED ORAL EVERY 12 HOURS
Qty: 0 | Refills: 0 | Status: DISCONTINUED | OUTPATIENT
Start: 2018-09-20 | End: 2018-09-20

## 2018-09-20 RX ADMIN — SEVELAMER CARBONATE 800 MILLIGRAM(S): 2400 POWDER, FOR SUSPENSION ORAL at 06:15

## 2018-09-20 RX ADMIN — SEVELAMER CARBONATE 800 MILLIGRAM(S): 2400 POWDER, FOR SUSPENSION ORAL at 21:24

## 2018-09-20 RX ADMIN — APIXABAN 2.5 MILLIGRAM(S): 2.5 TABLET, FILM COATED ORAL at 18:12

## 2018-09-20 RX ADMIN — Medication 25 MILLIGRAM(S): at 06:15

## 2018-09-20 RX ADMIN — Medication 200 MILLIGRAM(S): at 18:12

## 2018-09-20 RX ADMIN — Medication 1 TABLET(S): at 13:37

## 2018-09-20 RX ADMIN — SEVELAMER CARBONATE 800 MILLIGRAM(S): 2400 POWDER, FOR SUSPENSION ORAL at 13:37

## 2018-09-20 RX ADMIN — SENNA PLUS 2 TABLET(S): 8.6 TABLET ORAL at 21:24

## 2018-09-20 RX ADMIN — HEPARIN SODIUM 0 UNIT(S)/HR: 5000 INJECTION INTRAVENOUS; SUBCUTANEOUS at 06:51

## 2018-09-20 RX ADMIN — HEPARIN SODIUM 1100 UNIT(S)/HR: 5000 INJECTION INTRAVENOUS; SUBCUTANEOUS at 08:47

## 2018-09-20 RX ADMIN — Medication 25 MILLIGRAM(S): at 18:12

## 2018-09-20 RX ADMIN — Medication 1 MILLIGRAM(S): at 13:37

## 2018-09-20 RX ADMIN — Medication 1000 UNIT(S): at 13:37

## 2018-09-20 RX ADMIN — Medication 100 MILLIGRAM(S): at 13:37

## 2018-09-20 RX ADMIN — TIOTROPIUM BROMIDE 1 CAPSULE(S): 18 CAPSULE ORAL; RESPIRATORY (INHALATION) at 18:11

## 2018-09-20 RX ADMIN — Medication 200 MILLIGRAM(S): at 06:15

## 2018-09-20 NOTE — PROGRESS NOTE ADULT - PROBLEM SELECTOR PLAN 5
ureteral exchange stent and lithotripsy tomorrow.  NPO  Hold heparin 6 hrs prior to the procedure.
IMPROVE VTE Individual Risk Assessment          RISK                                                          Points  [ x ] Previous VTE                                                3  [  ] Thrombophilia                                             2  [  ] Lower limb paralysis                                   2        (unable to hold up >15 seconds)    [  ] Current Cancer                                             2         (within 6 months)  [x  ] Immobilization > 24 hrs                              1  [  ] ICU/CCU stay > 24 hours                             1  [  x] Age > 60                                                         1    IMPROVE VTE Score: 5    c/w heparin drip for DVT/PE
ureteral exchange stent and lithotripsy tomorrow.  NPO  Hold heparin in AM for the procedure  Repeat cbc after the procedure and resume regular diet

## 2018-09-20 NOTE — PROGRESS NOTE ADULT - PROBLEM SELECTOR PLAN 3
f/u b/l dopplers  hold heparin for now.  NPO  Scheduled for IVC filter tonight.
c/w home medications  continue to monitor BP
f/u  b/l dopplers  hold heparin for now.  NPO  Scheduled for IVC filter tonight.

## 2018-09-20 NOTE — PROGRESS NOTE ADULT - ASSESSMENT
Patient admitted due to concerns for PE obtained on CTA done as outpatient at [facility], along with bilateral DVT found as outpatient at vascular clinic, treated with high dose aspirin.

## 2018-09-20 NOTE — PROGRESS NOTE ADULT - SUBJECTIVE AND OBJECTIVE BOX
PGY 1 Note discussed with supervising resident and primary attending    Patient is a 78y old  Female who presents with a chief complaint of DVT (18 Sep 2018 21:40)      INTERVAL HPI/OVERNIGHT EVENTS:     MEDICATIONS  (STANDING):  cholecalciferol 1000 Unit(s) Oral daily  cholecalciferol 1000 Unit(s) Oral daily  ciprofloxacin   IVPB 400 milliGRAM(s) IV Intermittent every 12 hours  ciprofloxacin   IVPB      dextrose 5% + sodium chloride 0.45%. 1000 milliLiter(s) (75 mL/Hr) IV Continuous <Continuous>  docusate sodium 100 milliGRAM(s) Oral daily  docusate sodium 100 milliGRAM(s) Oral daily  folic acid 1 milliGRAM(s) Oral daily  folic acid 1 milliGRAM(s) Oral daily  heparin  Infusion.  Unit(s)/Hr (18 mL/Hr) IV Continuous <Continuous>  metoprolol tartrate 25 milliGRAM(s) Oral two times a day  metoprolol tartrate 25 milliGRAM(s) Oral two times a day  multivitamin/minerals 1 Tablet(s) Oral daily  multivitamin/minerals 1 Tablet(s) Oral daily  senna 2 Tablet(s) Oral at bedtime  senna 2 Tablet(s) Oral at bedtime  sevelamer hydrochloride 800 milliGRAM(s) Oral three times a day  sevelamer hydrochloride 800 milliGRAM(s) Oral three times a day  tiotropium 18 MICROgram(s) Capsule 1 Capsule(s) Inhalation daily  tiotropium 18 MICROgram(s) Capsule 1 Capsule(s) Inhalation daily    MEDICATIONS  (PRN):  heparin  Injectable 8000 Unit(s) IV Push every 6 hours PRN For aPTT less than 40  heparin  Injectable 4000 Unit(s) IV Push every 6 hours PRN For aPTT between 40 - 57  ondansetron Injectable 4 milliGRAM(s) IV Push every 6 hours PRN Nausea  oxyCODONE    5 mG/acetaminophen 325 mG 1 Tablet(s) Oral every 4 hours PRN Moderate Pain (4 - 6)      __________________________________________________  REVIEW OF SYSTEMS:    CONSTITUTIONAL: No fever,   EYES: no acute visual disturbances  NECK: No pain or stiffness  RESPIRATORY: No cough; No shortness of breath  CARDIOVASCULAR: No chest pain, no palpitations  GASTROINTESTINAL: No pain. No nausea or vomiting; No diarrhea   NEUROLOGICAL: No headache or numbness, no tremors  MUSCULOSKELETAL: No joint pain, no muscle pain  GENITOURINARY: no dysuria, no frequency, no hesitancy  PSYCHIATRY: no depression , no anxiety  ALL OTHER  ROS negative        Vital Signs Last 24 Hrs  T(C): 36.7 (19 Sep 2018 05:37), Max: 36.8 (18 Sep 2018 20:49)  T(F): 98 (19 Sep 2018 05:37), Max: 98.2 (18 Sep 2018 20:49)  HR: 59 (19 Sep 2018 06:50) (57 - 77)  BP: 140/61 (19 Sep 2018 05:37) (119/57 - 159/76)  BP(mean): --  RR: 17 (19 Sep 2018 05:37) (17 - 22)  SpO2: 97% (19 Sep 2018 05:37) (94% - 98%)    ________________________________________________  PHYSICAL EXAM:  GENERAL: NAD  HEENT: Normocephalic;  conjunctivae and sclerae clear; moist mucous membranes;   NECK : supple  CHEST/LUNG: Clear to auscultation bilaterally with good air entry   HEART: S1 S2  regular; no murmurs, gallops or rubs  ABDOMEN: Soft, Nontender, Nondistended; Bowel sounds present  EXTREMITIES: no cyanosis; no edema; no calf tenderness  SKIN: warm and dry; no rash  NERVOUS SYSTEM:  Awake and alert; Oriented  to place, person and time ; no new deficits    _________________________________________________  LABS:                        11.5   12.6  )-----------( 206      ( 19 Sep 2018 09:20 )             34.8     09-19    138  |  102  |  26<H>  ----------------------------<  134<H>  4.4   |  29  |  1.84<H>    Ca    8.9      19 Sep 2018 09:20  Phos  3.0     09-19  Mg     2.1     09-19      PT/INR - ( 18 Sep 2018 06:17 )   PT: 11.0 sec;   INR: 1.01 ratio         PTT - ( 19 Sep 2018 09:20 )  PTT:122.0 sec    CAPILLARY BLOOD GLUCOSE            RADIOLOGY & ADDITIONAL TESTS:    Imaging Personally Reviewed:  YES    Consultant(s) Notes Reviewed:   YES    Care Discussed with Consultants : YES     Plan of care was discussed with patient and /or primary care giver; all questions and concerns were addressed and care was aligned with patient's wishes.
PGY 1 Note discussed with supervising resident and primary attending    Patient is a 78y old  Female who presents with a chief complaint of abnormal CT finding as outpatient (19 Sep 2018 11:08)      INTERVAL HPI/OVERNIGHT EVENTS:  Patient is complaining of haematuria..    MEDICATIONS  (STANDING):  apixaban 5 milliGRAM(s) Oral every 12 hours  cholecalciferol 1000 Unit(s) Oral daily  ciprofloxacin   IVPB 400 milliGRAM(s) IV Intermittent every 12 hours  ciprofloxacin   IVPB      dextrose 5% + sodium chloride 0.45%. 1000 milliLiter(s) (75 mL/Hr) IV Continuous <Continuous>  docusate sodium 100 milliGRAM(s) Oral daily  folic acid 1 milliGRAM(s) Oral daily  heparin  Infusion.  Unit(s)/Hr (18 mL/Hr) IV Continuous <Continuous>  metoprolol tartrate 25 milliGRAM(s) Oral two times a day  multivitamin/minerals 1 Tablet(s) Oral daily  senna 2 Tablet(s) Oral at bedtime  sevelamer hydrochloride 800 milliGRAM(s) Oral three times a day  tiotropium 18 MICROgram(s) Capsule 1 Capsule(s) Inhalation daily    MEDICATIONS  (PRN):  heparin  Injectable 8000 Unit(s) IV Push every 6 hours PRN For aPTT less than 40  heparin  Injectable 4000 Unit(s) IV Push every 6 hours PRN For aPTT between 40 - 57  ondansetron Injectable 4 milliGRAM(s) IV Push every 6 hours PRN Nausea  oxyCODONE    5 mG/acetaminophen 325 mG 1 Tablet(s) Oral every 4 hours PRN Moderate Pain (4 - 6)      __________________________________________________  REVIEW OF SYSTEMS:    CONSTITUTIONAL: No fever,   EYES: no acute visual disturbances  NECK: No pain or stiffness  RESPIRATORY: No cough; No shortness of breath  CARDIOVASCULAR: No chest pain, no palpitations  GASTROINTESTINAL: No pain. No nausea or vomiting; No diarrhea   NEUROLOGICAL: No headache or numbness, no tremors  MUSCULOSKELETAL: No joint pain, no muscle pain  GENITOURINARY: no dysuria, no frequency, no hesitancy  PSYCHIATRY: no depression , no anxiety  ALL OTHER  ROS negative        Vital Signs Last 24 Hrs  T(C): 37.1 (20 Sep 2018 05:26), Max: 37.1 (19 Sep 2018 17:47)  T(F): 98.7 (20 Sep 2018 05:26), Max: 98.7 (19 Sep 2018 17:47)  HR: 66 (20 Sep 2018 05:26) (59 - 87)  BP: 128/72 (20 Sep 2018 05:26) (103/41 - 134/60)  BP(mean): --  RR: 17 (20 Sep 2018 05:26) (17 - 18)  SpO2: 94% (20 Sep 2018 05:26) (94% - 98%)    ________________________________________________  PHYSICAL EXAM:  GENERAL: NAD  HEENT: Normocephalic;  conjunctivae and sclerae clear; moist mucous membranes;   NECK : supple  CHEST/LUNG: Clear to auscultation bilaterally with good air entry   HEART: S1 S2  regular; no murmurs, gallops or rubs  ABDOMEN: Soft, Nontender, Nondistended; Bowel sounds present  EXTREMITIES: no cyanosis; no edema; no calf tenderness  SKIN: warm and dry; no rash  NERVOUS SYSTEM:  Awake and alert; Oriented  to place, person and time ; no new deficits    _________________________________________________  LABS:                        11.2   9.9   )-----------( 190      ( 20 Sep 2018 05:38 )             34.4     09-20    138  |  101  |  30<H>  ----------------------------<  217<H>  4.0   |  27  |  1.86<H>    Ca    8.3<L>      20 Sep 2018 05:38  Phos  3.0     09-19  Mg     2.1     09-19      PTT - ( 20 Sep 2018 05:37 )  PTT:>200.0 sec    CAPILLARY BLOOD GLUCOSE      POCT Blood Glucose.: 117 mg/dL (19 Sep 2018 21:11)        RADIOLOGY & ADDITIONAL TESTS:    Imaging Personally Reviewed:  YES    Consultant(s) Notes Reviewed:   YES    Care Discussed with Consultants : YES     Plan of care was discussed with patient and /or primary care giver; all questions and concerns were addressed and care was aligned with patient's wishes.
PGY 1 Note discussed with supervising resident and primary attending    Patient is a 78y old  Female who presents with a chief complaint of abnormal CTA finding as outpatient (17 Sep 2018 17:28)      INTERVAL HPI/OVERNIGHT EVENTS: Patient is sitting comfortably on chair and complains of constipation. Denies hematuria, bleeding.  MEDICATIONS  (STANDING):  cholecalciferol 1000 Unit(s) Oral daily  dextrose 5% + sodium chloride 0.45%. 1000 milliLiter(s) (75 mL/Hr) IV Continuous <Continuous>  docusate sodium 100 milliGRAM(s) Oral daily  folic acid 1 milliGRAM(s) Oral daily  heparin  Infusion. 1000 Unit(s)/Hr (10 mL/Hr) IV Continuous <Continuous>  metoprolol tartrate 25 milliGRAM(s) Oral two times a day  multivitamin/minerals 1 Tablet(s) Oral daily  senna 2 Tablet(s) Oral at bedtime  sevelamer hydrochloride 800 milliGRAM(s) Oral three times a day  tiotropium 18 MICROgram(s) Capsule 1 Capsule(s) Inhalation daily    MEDICATIONS  (PRN):  heparin  Injectable 8000 Unit(s) IV Push every 6 hours PRN For aPTT less than 40  heparin  Injectable 4000 Unit(s) IV Push every 6 hours PRN For aPTT between 40 - 57      __________________________________________________  REVIEW OF SYSTEMS:    CONSTITUTIONAL: No fever,   EYES: no acute visual disturbances  NECK: No pain or stiffness  RESPIRATORY: No cough; No shortness of breath  CARDIOVASCULAR: No chest pain, no palpitations  GASTROINTESTINAL: No pain. No nausea or vomiting; No diarrhea   NEUROLOGICAL: No headache or numbness, no tremors  MUSCULOSKELETAL: No joint pain, no muscle pain  GENITOURINARY: no dysuria, no frequency, no hesitancy  PSYCHIATRY: no depression , no anxiety  ALL OTHER  ROS negative        Vital Signs Last 24 Hrs  T(C): 36.7 (18 Sep 2018 05:19), Max: 36.8 (17 Sep 2018 11:25)  T(F): 98 (18 Sep 2018 05:19), Max: 98.2 (17 Sep 2018 11:25)  HR: 64 (18 Sep 2018 05:19) (64 - 96)  BP: 128/62 (18 Sep 2018 05:19) (96/62 - 145/72)  BP(mean): 98 (17 Sep 2018 18:01) (91 - 98)  RR: 18 (18 Sep 2018 05:19) (17 - 20)  SpO2: 95% (18 Sep 2018 05:19) (95% - 100%)    ________________________________________________  PHYSICAL EXAM:  GENERAL: NAD  HEENT: Normocephalic;  conjunctivae and sclerae clear; moist mucous membranes;   NECK : supple  CHEST/LUNG: Clear to auscultation bilaterally with good air entry   HEART: S1 S2  regular; no murmurs, gallops or rubs  ABDOMEN: Soft, tender, distended;  EXTREMITIES: no cyanosis; no edema; no calf tenderness  SKIN: warm and dry; no rash  NERVOUS SYSTEM:  Awake and alert; Oriented  to place, person and time ; no new deficits    _________________________________________________  LABS:                        11.2   8.7   )-----------( 181      ( 18 Sep 2018 06:17 )             34.4     09-17    141  |  104  |  28<H>  ----------------------------<  96  4.1   |  29  |  1.53<H>    Ca    8.7      17 Sep 2018 06:16  Phos  3.4     09-17  Mg     2.2     09-17      PT/INR - ( 18 Sep 2018 06:17 )   PT: 11.0 sec;   INR: 1.01 ratio         PTT - ( 18 Sep 2018 06:17 )  PTT:59.2 sec  Urinalysis Basic - ( 16 Sep 2018 12:39 )    Color: x / Appearance: x / SG: x / pH: x  Gluc: x / Ketone: x  / Bili: x / Urobili: x   Blood: x / Protein: x / Nitrite: x   Leuk Esterase: x / RBC: >50 /HPF / WBC 6-10 /HPF   Sq Epi: x / Non Sq Epi: x / Bacteria: Moderate /HPF      CAPILLARY BLOOD GLUCOSE            RADIOLOGY & ADDITIONAL TESTS:    Imaging Personally Reviewed:  YES    Consultant(s) Notes Reviewed:   YES    Care Discussed with Consultants : YES     Plan of care was discussed with patient and /or primary care giver; all questions and concerns were addressed and care was aligned with patient's wishes.
POSTOP NOTE    Patient is doing okay.        Vital Signs Last 24 Hrs  T(C): 36.8 (18 Sep 2018 20:49), Max: 36.8 (18 Sep 2018 20:49)  T(F): 98.2 (18 Sep 2018 20:49), Max: 98.2 (18 Sep 2018 20:49)  HR: 66 (18 Sep 2018 20:49) (58 - 77)  BP: 128/74 (18 Sep 2018 20:49) (119/57 - 159/76)  BP(mean): --  RR: 18 (18 Sep 2018 20:49) (18 - 22)  SpO2: 98% (18 Sep 2018 20:49) (94% - 98%)    Daily     Daily     I&O's Detail    17 Sep 2018 07:01  -  18 Sep 2018 07:00  --------------------------------------------------------  IN:    dextrose 5% + sodium chloride 0.45%.: 94 mL    lactated ringers.: 400 mL  Total IN: 494 mL    OUT:  Total OUT: 0 mL    Total NET: 494 mL      18 Sep 2018 07:01  -  18 Sep 2018 21:41  --------------------------------------------------------  IN:    Lactated Ringers IV Bolus: 500 mL  Total IN: 500 mL    OUT:  Total OUT: 0 mL    Total NET: 500 mL          MEDICATIONS  (STANDING):  cholecalciferol 1000 Unit(s) Oral daily  cholecalciferol 1000 Unit(s) Oral daily  ciprofloxacin   IVPB      dextrose 5% + sodium chloride 0.45%. 1000 milliLiter(s) (75 mL/Hr) IV Continuous <Continuous>  docusate sodium 100 milliGRAM(s) Oral daily  docusate sodium 100 milliGRAM(s) Oral daily  folic acid 1 milliGRAM(s) Oral daily  folic acid 1 milliGRAM(s) Oral daily  heparin  Infusion.  Unit(s)/Hr (18 mL/Hr) IV Continuous <Continuous>  metoprolol tartrate 25 milliGRAM(s) Oral two times a day  metoprolol tartrate 25 milliGRAM(s) Oral two times a day  multivitamin/minerals 1 Tablet(s) Oral daily  multivitamin/minerals 1 Tablet(s) Oral daily  senna 2 Tablet(s) Oral at bedtime  senna 2 Tablet(s) Oral at bedtime  sevelamer hydrochloride 800 milliGRAM(s) Oral three times a day  sevelamer hydrochloride 800 milliGRAM(s) Oral three times a day  tiotropium 18 MICROgram(s) Capsule 1 Capsule(s) Inhalation daily  tiotropium 18 MICROgram(s) Capsule 1 Capsule(s) Inhalation daily    MEDICATIONS  (PRN):  heparin  Injectable 8000 Unit(s) IV Push every 6 hours PRN For aPTT less than 40  heparin  Injectable 4000 Unit(s) IV Push every 6 hours PRN For aPTT between 40 - 57  ondansetron Injectable 4 milliGRAM(s) IV Push every 6 hours PRN Nausea  oxyCODONE    5 mG/acetaminophen 325 mG 1 Tablet(s) Oral every 4 hours PRN Moderate Pain (4 - 6)      PHYSICAL EXAM:  GENERAL: In no acute distress  CHEST/LUNG: Clear to percussion bilaterally; Normal respiratory effort  HEART: Regular rate and rhythm  ABDOMEN: Soft, Nontender, Nondistended; Bowel sounds present  EXTREMITIES:  No clubbing, cyanosis, or edema; Lt arteriotomy site hemostatic  : normal genitalia      LABS:                        11.5   8.9   )-----------( 187      ( 18 Sep 2018 19:59 )             35.2     Neutrophil %:   09-18    138  |  103  |  26<H>  ----------------------------<  157<H>  4.5   |  28  |  1.80<H>    Ca    8.8      18 Sep 2018 19:59  Phos  3.4     09-17  Mg     2.2     09-17      PT/INR - ( 18 Sep 2018 06:17 )   PT: 11.0 sec;   INR: 1.01 ratio         PTT - ( 18 Sep 2018 06:17 )  PTT:59.2 sec      A/P-    S/p Cysto, Chalo Stent exchange, bladder laser lithotripsy  left ureteral laser lithotripsy.  S/P Lt groin Insertion of IVC Filter.  Doing well.  Pain control  Postop care.
Patient is a 78y old  Female who presents with a chief complaint of abnormal CTA finding as outpatient (15 Sep 2018 19:38)    PATIENT IS SEEN AND EXAMINED IN MEDICAL FLOOR.    ALLERGIES:  penicillin (Rash)      VITALS:    Vital Signs Last 24 Hrs  T(C): 37.1 (16 Sep 2018 11:30), Max: 37.1 (16 Sep 2018 11:30)  T(F): 98.7 (16 Sep 2018 11:30), Max: 98.7 (16 Sep 2018 11:30)  HR: 70 (16 Sep 2018 11:30) (60 - 84)  BP: 142/66 (16 Sep 2018 11:30) (126/68 - 143/71)  BP(mean): --  RR: 16 (16 Sep 2018 11:30) (16 - 18)  SpO2: 98% (16 Sep 2018 11:30) (97% - 100%)    LABS:  CBC Full  -  ( 16 Sep 2018 14:11 )  WBC Count : 9.5 K/uL  Hemoglobin : 13.0 g/dL  Hematocrit : 40.7 %  Platelet Count - Automated : 233 K/uL  Mean Cell Volume : 89.6 fl  Mean Cell Hemoglobin : 28.8 pg  Mean Cell Hemoglobin Concentration : 32.1 gm/dL  Auto Neutrophil # : x  Auto Lymphocyte # : x  Auto Monocyte # : x  Auto Eosinophil # : x  Auto Basophil # : x  Auto Neutrophil % : x  Auto Lymphocyte % : x  Auto Monocyte % : x  Auto Eosinophil % : x  Auto Basophil % : x    PT/INR - ( 15 Sep 2018 12:55 )   PT: 10.9 sec;   INR: 1.00 ratio         PTT - ( 15 Sep 2018 12:55 )  PTT:28.6 sec  09-16    142  |  105  |  30<H>  ----------------------------<  97  4.2   |  29  |  1.53<H>    Ca    9.2      16 Sep 2018 05:55  Phos  4.1     09-16  Mg     2.3     09-16    TPro  7.8  /  Alb  3.6  /  TBili  0.5  /  DBili  x   /  AST  46<H>  /  ALT  29  /  AlkPhos  90  09-15      CARDIAC MARKERS ( 15 Sep 2018 12:35 )  <0.015 ng/mL / x     / 220 U/L / x     / <1.0 ng/mL      LIVER FUNCTIONS - ( 15 Sep 2018 12:35 )  Alb: 3.6 g/dL / Pro: 7.8 g/dL / ALK PHOS: 90 U/L / ALT: 29 U/L DA / AST: 46 U/L / GGT: x             .Urine Clean Catch (Midstream)  08-04 @ 10:36   No growth  --  --      .Urine Bladder (from O.R.)  07-29 @ 00:34   No growth at 48 hours  --  --      .Urine Catheterized  07-29 @ 00:15   No growth  --  --        MEDICATIONS:    MEDICATIONS  (STANDING):  cholecalciferol 1000 Unit(s) Oral daily  folic acid 1 milliGRAM(s) Oral daily  heparin  Infusion.  Unit(s)/Hr (18 mL/Hr) IV Continuous <Continuous>  metoprolol tartrate 25 milliGRAM(s) Oral two times a day  multivitamin/minerals 1 Tablet(s) Oral daily  sevelamer hydrochloride 800 milliGRAM(s) Oral three times a day  tiotropium 18 MICROgram(s) Capsule 1 Capsule(s) Inhalation daily      MEDICATIONS  (PRN):      REVIEW OF SYSTEMS:                           ALL ROS DONE [ X   ]    CONSTITUTIONAL:  LETHARGIC [   ], FEVER [   ], UNRESPONSIVE [   ]  CVS:  CP  [   ], SOB, [   ], PALPITATIONS [   ], DIZZYNESS [   ]  RS: COUGH [   ], SPUTUM [   ]  GI: ABDOMINAL PAIN [   ], NAUSEA [   ], VOMITINGS [   ], DIARRHEA [   ], CONSTIPATION [   ]  :  DYSURIA [   ], NOCTURIA [   ], INCREASED FREQUENCY [   ], DRIBLING [   ],  SKELETAL: PAINFUL JOINTS [   ], SWOLLEN JOINTS [   ], NECK ACHE [   ], LOW BACK ACHE [   ],  SKIN : ULCERS [   ], RASH [   ], ITCHING [   ]  CNS: HEAD ACHE [   ], DOUBLE VISION [   ], BLURRED VISION [   ], AMS / CONFUSION [   ], SEIZURES [   ], WEAKNESS [   ],TINGLING / NUMBNESS [   ]    PHYSICAL EXAMINATION:  GENERAL APPEARANCE: NO DISTRESS  HEENT:  NO PALLOR, NO  JVD,  NO   NODES, NECK SUPPLE  CVS: S1 +, S2 +,   RS: AEEB,  OCCASIONAL  RALES +,   NO RONCHI  ABD: SOFT, NT, NO, BS +  EXT:  PE +  SKIN: WARM,   SKELETAL:  ROM ACCEPTABLE  CNS:  AAO X  3  , NO  DEFICITS    RADIOLOGY :    < from: CT Abdomen and Pelvis No Cont (08.04.18 @ 16:34) >  Interpretation: Liver: No focal lesions.      Biliary tree: Not dilated. Calcified gallstones are present in the   collapsed gallbladder.      Solid organs: Similar left renal atrophy.      Retroperitoneum: Bilateral double-J ureteral stents extending from the   renal pelves to the urinary bladder. There is a 10 mm stone at the left   ureteropelvic junction as well as an 8.5 mm stone in the lower pole of   the right collecting system.    Bowel: No evidence of ascites, bowel obstruction or free air.      Appendix: Normal    Urinary bladder: Contains a small amount of air and similar large bladder   calculus.      Pelvis: Shows similar calcified uterine mass likely indicating exophytic   fibroid.      The inguinal regions are unremarkable.      The lung bases are clear.      IMPRESSION: Ureteral stents and stones as described.      < end of copied text >      ASSESSMENT :     Other pulmonary embolism without acute cor pulmonale  Breast cancer, right  DVT (deep venous thrombosis)  Calculus in bladder  Calculus, ureter  Kidney stones  HTN (hypertension)  History of total knee replacement, right  History of cystoscopy  History of right cataract extraction  History of left cataract extraction  History of lumpectomy of right breast    PLAN:  HPI:  77 y/o F pt with PMHx of breast CA s/p lumpectomy (13 years ago), kidney stones, kidney stent (scheduled to be removed next week) bilateral DVT, HTN and PSHx of R TKA sent to ED by primary medical doctor due to abnormal CTA chest finding of pulmonary emboslim.. Pt went to her PMD yesterday for medical clearance for removal of kidney stent and was told CT reveal abnormal finding in chest. Pt was originally scheduled for CT after CXR showed abnormal finding. Per Dr. Mcclain who contacted PMD, patient has stable left lower lobe pulmonary embolism, however now has new left upper lobe pulmonary embolism as well. Patient was called by PMD to come into ED for treatment. Patient denies shortness of breath, chest pain, calf pain, or any other complaints. (15 Sep 2018 19:38)    - B/L LOWER EXTREMITY DVT AND PULMONARY EMBOLISM - ON IV HEPARIN. RECENT IMAGING STUDIES FROM PMD NEEDS TO BE OBTAINED  - ARF / CKD   - B/L RENAL AND URETERIC CALCULII WITH URETERIC STENTS IN PLACE - DR. HOWELL , UROLOGIST TO EVAL PATIENT IN AM  - MONITOR H/H WITH SERIAL CBC - D/W STAFF  - GI PROPHYLAXIS  - DR. FLORES
Pre Op Note    Diagnosis: Bilateral Ureteral and Urinary Bladder stones   Procedure: Cystoscopy, bilateral ureteroscopy with laser lithotripsy, litholapaxy, bilateral ureteral stent exchange   Surgeon: Dr Caldwell                           12.0   8.1   )-----------( 209      ( 17 Sep 2018 11:41 )             36.9     09-17    141  |  104  |  28<H>  ----------------------------<  96  4.1   |  29  |  1.53<H>    Ca    8.7      17 Sep 2018 06:16  Phos  3.4     09-17  Mg     2.2     09-17      PT/INR - ( 17 Sep 2018 06:16 )   PT: 11.8 sec;   INR: 1.08 ratio         PTT - ( 17 Sep 2018 11:41 )  PTT:53.9 sec  Urinalysis Basic - ( 16 Sep 2018 12:39 )    Color: x / Appearance: x / SG: x / pH: x  Gluc: x / Ketone: x  / Bili: x / Urobili: x   Blood: x / Protein: x / Nitrite: x   Leuk Esterase: x / RBC: >50 /HPF / WBC 6-10 /HPF   Sq Epi: x / Non Sq Epi: x / Bacteria: Moderate /HPF        RADIOLOGY & ADDITIONAL STUDIES:  < from: CT Abdomen and Pelvis No Cont (08.04.18 @ 16:34) >  Biliary tree: Not dilated. Calcified gallstones are present in the   collapsed gallbladder.      Solid organs: Similar left renal atrophy.      Retroperitoneum: Bilateral double-J ureteral stents extending from the   renal pelves to the urinary bladder. There is a 10 mm stone at the left   ureteropelvic junction as well as an 8.5 mm stone in the lower pole of   the right collecting system.    Bowel: No evidence of ascites, bowel obstruction or free air.      Appendix: Normal    Urinary bladder: Contains a small amount of air and similar large bladder   calculus.      Pelvis: Shows similar calcified uterine mass likely indicating exophytic   fibroid.      The inguinal regions are unremarkable.      The lung bases are clear.      IMPRESSION: Ureteral stents and stones as described.    < end of copied text >    Culture - Urine (09.16.18 @ 15:11)    Specimen Source: .Urine Clean Catch (Midstream)    Culture Results:   <10,000 CFU/ml Normal Urogenital naldo present
s/p cysto, bilateral stent exchange, bladder laser litho, L ureteral laser litho POD#1  Patient examined at bedside, no complaints.   Voiding blood tinged urine without clots  No nausea, no vomiting  Tolerating diet    T(C): 36.7 (09-19-18 @ 05:37), Max: 36.8 (09-18-18 @ 20:49)  HR: 59 (09-19-18 @ 06:50) (57 - 77)  BP: 140/61 (09-19-18 @ 05:37) (128/74 - 159/76)  RR: 17 (09-19-18 @ 05:37) (17 - 22)  SpO2: 97% (09-19-18 @ 05:37) (94% - 98%)  Wt(kg): --      09-18 @ 07:01  -  09-19 @ 07:00  --------------------------------------------------------  IN: 680 mL / OUT: 0 mL / NET: 680 mL      Physical Exam  General: AAOx3, No acute distress  Skin: No jaundice, no icterus  Abdomen: soft, nontender, nondistended  : Normal external genitalia, bilateral stents visible and taped to skin  Extremities: non edematous, no calf pain bilaterally                          11.5   12.6  )-----------( 206      ( 19 Sep 2018 09:20 )             34.8   09-19    138  |  102  |  26<H>  ----------------------------<  134<H>  4.4   |  29  |  1.84<H>    Ca    8.9      19 Sep 2018 09:20  Phos  3.0     09-19  Mg     2.1     09-19
PGY 1 Note discussed with supervising resident and primary attending    Patient is a 78y old  Female who presents with a chief complaint of abnormal CTA finding as outpatient (17 Sep 2018 12:14)      INTERVAL HPI/OVERNIGHT EVENTS: She complains of bleeding while passing urine and bed was soaked with blood. She complains of constipation since 4 days.    MEDICATIONS  (STANDING):  cholecalciferol 1000 Unit(s) Oral daily  dextrose 5% + sodium chloride 0.45%. 1000 milliLiter(s) (60 mL/Hr) IV Continuous <Continuous>  folic acid 1 milliGRAM(s) Oral daily  metoprolol tartrate 25 milliGRAM(s) Oral two times a day  multivitamin/minerals 1 Tablet(s) Oral daily  sevelamer hydrochloride 800 milliGRAM(s) Oral three times a day  tiotropium 18 MICROgram(s) Capsule 1 Capsule(s) Inhalation daily    MEDICATIONS  (PRN):      __________________________________________________  REVIEW OF SYSTEMS:    CONSTITUTIONAL: No fever,   EYES: no acute visual disturbances  NECK: No pain or stiffness  RESPIRATORY: No cough; No shortness of breath  CARDIOVASCULAR: No chest pain, no palpitations  GASTROINTESTINAL: No pain. No nausea or vomiting; No diarrhea   NEUROLOGICAL: No headache or numbness, no tremors  MUSCULOSKELETAL: No joint pain, no muscle pain  GENITOURINARY: no dysuria, no frequency, no hesitancy  PSYCHIATRY: no depression , no anxiety  ALL OTHER  ROS negative        Vital Signs Last 24 Hrs  T(C): 36.8 (17 Sep 2018 11:25), Max: 37.1 (17 Sep 2018 05:20)  T(F): 98.2 (17 Sep 2018 11:25), Max: 98.8 (17 Sep 2018 05:20)  HR: 68 (17 Sep 2018 11:25) (57 - 73)  BP: 120/85 (17 Sep 2018 11:25) (120/85 - 160/80)  BP(mean): --  RR: 18 (17 Sep 2018 11:25) (14 - 18)  SpO2: 97% (17 Sep 2018 11:25) (96% - 99%)    ________________________________________________  PHYSICAL EXAM:  GENERAL: NAD  HEENT: Normocephalic;  conjunctivae and sclerae clear; moist mucous membranes;   NECK : supple  CHEST/LUNG: Clear to auscultation bilaterally with good air entry   HEART: S1 S2  regular; no murmurs, gallops or rubs  ABDOMEN: Soft, Nontender,distended; Bowel sounds present and it was hard on palpation.  EXTREMITIES: no cyanosis; no edema; no calf tenderness  SKIN: warm and dry; no rash  NERVOUS SYSTEM:  Awake and alert; Oriented  to place, person and time ; no new deficits    _________________________________________________  LABS:                        12.0   8.1   )-----------( 209      ( 17 Sep 2018 11:41 )             36.9     09-17    141  |  104  |  28<H>  ----------------------------<  96  4.1   |  29  |  1.53<H>    Ca    8.7      17 Sep 2018 06:16  Phos  3.4     09-17  Mg     2.2     09-17      PT/INR - ( 17 Sep 2018 06:16 )   PT: 11.8 sec;   INR: 1.08 ratio         PTT - ( 17 Sep 2018 11:41 )  PTT:53.9 sec  Urinalysis Basic - ( 16 Sep 2018 12:39 )    Color: x / Appearance: x / SG: x / pH: x  Gluc: x / Ketone: x  / Bili: x / Urobili: x   Blood: x / Protein: x / Nitrite: x   Leuk Esterase: x / RBC: >50 /HPF / WBC 6-10 /HPF   Sq Epi: x / Non Sq Epi: x / Bacteria: Moderate /HPF      CAPILLARY BLOOD GLUCOSE      POCT Blood Glucose.: 110 mg/dL (17 Sep 2018 08:23)        RADIOLOGY & ADDITIONAL TESTS:    Imaging Personally Reviewed:  YES    Consultant(s) Notes Reviewed:   YES    Care Discussed with Consultants : YES     Plan of care was discussed with patient and /or primary care giver; all questions and concerns were addressed and care was aligned with patient's wishes.

## 2018-09-20 NOTE — PROGRESS NOTE ADULT - PROBLEM SELECTOR PLAN 4
c/w home medications  continue to monitor BP
Patient is complaining of hematuria.  s/p stent removal  stent removal in AM
c/w home medications  continue to monitor BP

## 2018-09-20 NOTE — PROGRESS NOTE ADULT - PROVIDER SPECIALTY LIST ADULT
Internal Medicine
Surgery
Urology
Urology
Internal Medicine

## 2018-09-20 NOTE — PROGRESS NOTE ADULT - PROBLEM SELECTOR PLAN 1
s/p full dose lovenox  hold heparin for now.  NPO  Scheduled for IVC filter tonight.  Called PCP office for reports and they told they will fax the reports.
s/p IVC Filter placed  hold heparin in AM for the ureteral stent exchange  NPO  Called PCP office for reports and they told they will fax the reports.  H/H dropped from 11.7 to 11.2 likely due to procedure.
s/p ivc filter  Stop heparin at noon  start Eliquis bid  ECHO showed grade 1 diastolic dysfunction with EF >55%.    Called PCP office for reports and they told they will fax the reports.  H/H dropped from 11.7 to 11.2 likely due to procedure.

## 2018-09-20 NOTE — PHARMACOTHERAPY INTERVENTION NOTE - COMMENTS
Patients CrCl is 22. Recommended not to use Apixaban for this patient. Hematology, nephrology and/or specialists should be consulted.  If apixaban is to be used, then a dose of 2.5mg q12h be used without the 7 day initiation phase. Patient's CrCl should be carefully monitored and apixaban discontinued if CrCl falls below 15.

## 2018-09-21 VITALS — WEIGHT: 110.23 LBS

## 2018-09-21 LAB
HCT VFR BLD CALC: 34.1 % — LOW (ref 34.5–45)
HGB BLD-MCNC: 11.1 G/DL — LOW (ref 11.5–15.5)
MCHC RBC-ENTMCNC: 28.6 PG — SIGNIFICANT CHANGE UP (ref 27–34)
MCHC RBC-ENTMCNC: 32.5 GM/DL — SIGNIFICANT CHANGE UP (ref 32–36)
MCV RBC AUTO: 88.1 FL — SIGNIFICANT CHANGE UP (ref 80–100)
PLATELET # BLD AUTO: 177 K/UL — SIGNIFICANT CHANGE UP (ref 150–400)
RBC # BLD: 3.87 M/UL — SIGNIFICANT CHANGE UP (ref 3.8–5.2)
RBC # FLD: 12.9 % — SIGNIFICANT CHANGE UP (ref 10.3–14.5)
WBC # BLD: 9.6 K/UL — SIGNIFICANT CHANGE UP (ref 3.8–10.5)
WBC # FLD AUTO: 9.6 K/UL — SIGNIFICANT CHANGE UP (ref 3.8–10.5)

## 2018-09-21 RX ORDER — CHOLECALCIFEROL (VITAMIN D3) 125 MCG
1 CAPSULE ORAL
Qty: 30 | Refills: 0 | OUTPATIENT
Start: 2018-09-21 | End: 2018-10-20

## 2018-09-21 RX ORDER — SEVELAMER CARBONATE 2400 MG/1
1 POWDER, FOR SUSPENSION ORAL
Qty: 90 | Refills: 0 | OUTPATIENT
Start: 2018-09-21 | End: 2018-10-20

## 2018-09-21 RX ORDER — FOLIC ACID 0.8 MG
1 TABLET ORAL
Qty: 0 | Refills: 0 | COMMUNITY

## 2018-09-21 RX ORDER — ASPIRIN/CALCIUM CARB/MAGNESIUM 324 MG
1 TABLET ORAL
Qty: 0 | Refills: 0 | COMMUNITY

## 2018-09-21 RX ORDER — MULTIVIT-MIN/FERROUS GLUCONATE 9 MG/15 ML
1 LIQUID (ML) ORAL
Qty: 30 | Refills: 0 | OUTPATIENT
Start: 2018-09-21 | End: 2018-10-20

## 2018-09-21 RX ORDER — FOLIC ACID 0.8 MG
1 TABLET ORAL
Qty: 30 | Refills: 0 | OUTPATIENT
Start: 2018-09-21 | End: 2018-10-20

## 2018-09-21 RX ORDER — ASPIRIN/CALCIUM CARB/MAGNESIUM 324 MG
1 TABLET ORAL
Qty: 30 | Refills: 0 | OUTPATIENT
Start: 2018-09-21 | End: 2018-10-20

## 2018-09-21 RX ORDER — APIXABAN 2.5 MG/1
1 TABLET, FILM COATED ORAL
Qty: 60 | Refills: 0 | OUTPATIENT
Start: 2018-09-21 | End: 2018-10-20

## 2018-09-21 RX ORDER — METOPROLOL TARTRATE 50 MG
1 TABLET ORAL
Qty: 60 | Refills: 0 | OUTPATIENT
Start: 2018-09-21 | End: 2018-10-20

## 2018-09-21 RX ORDER — MULTIVIT-MIN/FERROUS GLUCONATE 9 MG/15 ML
1 LIQUID (ML) ORAL
Qty: 0 | Refills: 0 | COMMUNITY

## 2018-09-21 RX ORDER — CHOLECALCIFEROL (VITAMIN D3) 125 MCG
1 CAPSULE ORAL
Qty: 0 | Refills: 0 | COMMUNITY

## 2018-09-21 RX ORDER — TIOTROPIUM BROMIDE 18 UG/1
1 CAPSULE ORAL; RESPIRATORY (INHALATION)
Qty: 1 | Refills: 0 | OUTPATIENT
Start: 2018-09-21 | End: 2018-10-20

## 2018-09-21 RX ADMIN — Medication 100 MILLIGRAM(S): at 11:25

## 2018-09-21 RX ADMIN — SEVELAMER CARBONATE 800 MILLIGRAM(S): 2400 POWDER, FOR SUSPENSION ORAL at 13:23

## 2018-09-21 RX ADMIN — Medication 1 TABLET(S): at 11:26

## 2018-09-21 RX ADMIN — Medication 25 MILLIGRAM(S): at 05:46

## 2018-09-21 RX ADMIN — TIOTROPIUM BROMIDE 1 CAPSULE(S): 18 CAPSULE ORAL; RESPIRATORY (INHALATION) at 11:26

## 2018-09-21 RX ADMIN — SEVELAMER CARBONATE 800 MILLIGRAM(S): 2400 POWDER, FOR SUSPENSION ORAL at 05:46

## 2018-09-21 RX ADMIN — Medication 1000 UNIT(S): at 11:26

## 2018-09-21 RX ADMIN — Medication 200 MILLIGRAM(S): at 05:48

## 2018-09-21 RX ADMIN — APIXABAN 2.5 MILLIGRAM(S): 2.5 TABLET, FILM COATED ORAL at 05:48

## 2018-09-21 RX ADMIN — Medication 1 MILLIGRAM(S): at 11:26

## 2018-09-21 NOTE — CHART NOTE - NSCHARTNOTEFT_GEN_A_CORE
Upon Nutritional Assessment by the Registered Dietitian your patient was determined to meet criteria / has evidence of the following diagnosis/diagnoses:          [ ]  Mild Protein Calorie Malnutrition        [ ]  Moderate Protein Calorie Malnutrition        [ ] Severe Protein Calorie Malnutrition        [ ] Unspecified Protein Calorie Malnutrition        [ ] Underweight / BMI <19        [ x] Morbid Obesity / BMI > 40      Findings as based on:  •  Comprehensive nutrition assessment and consultation  •  Calorie counts (nutrient intake analysis)  •  Food acceptance and intake status from observations by staff  •  Follow up  •  Patient education  •  Intervention secondary to interdisciplinary rounds  •   concerns      Treatment:    The following diet has been recommended:      PROVIDER Section:     By signing this assessment you are acknowledging and agree with the diagnosis/diagnoses assigned by the Registered Dietitian    Comments:  change diet to renal, DASH/TLC, declined oral supplements

## 2018-09-21 NOTE — DIETITIAN INITIAL EVALUATION ADULT. - DIET TYPE
no concentrated phosphorus/no concentrated potassium/DASH/TLC (sodium and cholesterol restricted diet)

## 2018-09-21 NOTE — DIETITIAN INITIAL EVALUATION ADULT. - PERTINENT LABORATORY DATA
09-20 Na138 mmol/L Glu 217 mg/dL<H> K+ 4.0 mmol/L Cr  1.86 mg/dL<H> BUN 30 mg/dL<H> 09-19 Phos 3.0 mg/dL 09-15 Alb 3.6 g/dL

## 2018-09-21 NOTE — DIETITIAN INITIAL EVALUATION ADULT. - PROBLEM SELECTOR PLAN 6
IMPROVE VTE Individual Risk Assessment          RISK                                                          Points  [ x ] Previous VTE                                                3  [  ] Thrombophilia                                             2  [  ] Lower limb paralysis                                   2        (unable to hold up >15 seconds)    [  ] Current Cancer                                             2         (within 6 months)  [x  ] Immobilization > 24 hrs                              1  [  ] ICU/CCU stay > 24 hours                             1  [  x] Age > 60                                                         1    IMPROVE VTE Score: 5    c/w heparin drip for DVT/PE

## 2018-10-04 ENCOUNTER — APPOINTMENT (OUTPATIENT)
Dept: VASCULAR SURGERY | Facility: CLINIC | Age: 78
End: 2018-10-04
Payer: MEDICARE

## 2018-10-04 PROCEDURE — 99214 OFFICE O/P EST MOD 30 MIN: CPT

## 2018-10-04 PROCEDURE — 93970 EXTREMITY STUDY: CPT

## 2018-10-11 ENCOUNTER — RESULT REVIEW (OUTPATIENT)
Age: 78
End: 2018-10-11

## 2018-10-24 PROCEDURE — 85027 COMPLETE CBC AUTOMATED: CPT

## 2018-10-24 PROCEDURE — 82306 VITAMIN D 25 HYDROXY: CPT

## 2018-10-24 PROCEDURE — 84484 ASSAY OF TROPONIN QUANT: CPT

## 2018-10-24 PROCEDURE — 87086 URINE CULTURE/COLONY COUNT: CPT

## 2018-10-24 PROCEDURE — 86900 BLOOD TYPING SEROLOGIC ABO: CPT

## 2018-10-24 PROCEDURE — 82550 ASSAY OF CK (CPK): CPT

## 2018-10-24 PROCEDURE — 80053 COMPREHEN METABOLIC PANEL: CPT

## 2018-10-24 PROCEDURE — 93306 TTE W/DOPPLER COMPLETE: CPT

## 2018-10-24 PROCEDURE — 86850 RBC ANTIBODY SCREEN: CPT

## 2018-10-24 PROCEDURE — 82553 CREATINE MB FRACTION: CPT

## 2018-10-24 PROCEDURE — 99285 EMERGENCY DEPT VISIT HI MDM: CPT | Mod: 25

## 2018-10-24 PROCEDURE — C1894: CPT

## 2018-10-24 PROCEDURE — 86901 BLOOD TYPING SEROLOGIC RH(D): CPT

## 2018-10-24 PROCEDURE — 76000 FLUOROSCOPY <1 HR PHYS/QHP: CPT

## 2018-10-24 PROCEDURE — 93970 EXTREMITY STUDY: CPT

## 2018-10-24 PROCEDURE — 85379 FIBRIN DEGRADATION QUANT: CPT

## 2018-10-24 PROCEDURE — 84443 ASSAY THYROID STIM HORMONE: CPT

## 2018-10-24 PROCEDURE — 85730 THROMBOPLASTIN TIME PARTIAL: CPT

## 2018-10-24 PROCEDURE — 36415 COLL VENOUS BLD VENIPUNCTURE: CPT

## 2018-10-24 PROCEDURE — C1880: CPT

## 2018-10-24 PROCEDURE — C2617: CPT

## 2018-10-24 PROCEDURE — 82962 GLUCOSE BLOOD TEST: CPT

## 2018-10-24 PROCEDURE — C1769: CPT

## 2018-10-24 PROCEDURE — C1889: CPT

## 2018-10-24 PROCEDURE — C1758: CPT

## 2018-10-24 PROCEDURE — 94640 AIRWAY INHALATION TREATMENT: CPT

## 2018-10-24 PROCEDURE — G0463: CPT

## 2018-10-24 PROCEDURE — 93005 ELECTROCARDIOGRAM TRACING: CPT

## 2018-10-24 PROCEDURE — 84100 ASSAY OF PHOSPHORUS: CPT

## 2018-10-24 PROCEDURE — 85610 PROTHROMBIN TIME: CPT

## 2018-10-24 PROCEDURE — 83735 ASSAY OF MAGNESIUM: CPT

## 2018-10-24 PROCEDURE — 81001 URINALYSIS AUTO W/SCOPE: CPT

## 2018-10-24 PROCEDURE — 88300 SURGICAL PATH GROSS: CPT

## 2018-10-24 PROCEDURE — 80048 BASIC METABOLIC PNL TOTAL CA: CPT

## 2018-10-24 PROCEDURE — 82365 CALCULUS SPECTROSCOPY: CPT

## 2018-11-20 ENCOUNTER — APPOINTMENT (OUTPATIENT)
Dept: VASCULAR SURGERY | Facility: CLINIC | Age: 78
End: 2018-11-20
Payer: MEDICARE

## 2018-11-20 VITALS
HEART RATE: 66 BPM | SYSTOLIC BLOOD PRESSURE: 184 MMHG | BODY MASS INDEX: 40.48 KG/M2 | DIASTOLIC BLOOD PRESSURE: 90 MMHG | WEIGHT: 220 LBS | HEIGHT: 62 IN

## 2018-11-20 PROCEDURE — 99213 OFFICE O/P EST LOW 20 MIN: CPT

## 2018-11-20 PROCEDURE — 93970 EXTREMITY STUDY: CPT

## 2019-01-24 ENCOUNTER — RX RENEWAL (OUTPATIENT)
Age: 79
End: 2019-01-24

## 2019-01-31 ENCOUNTER — APPOINTMENT (OUTPATIENT)
Dept: PULMONOLOGY | Facility: CLINIC | Age: 79
End: 2019-01-31
Payer: MEDICARE

## 2019-01-31 VITALS
BODY MASS INDEX: 42.33 KG/M2 | HEART RATE: 87 BPM | RESPIRATION RATE: 16 BRPM | OXYGEN SATURATION: 96 % | HEIGHT: 62 IN | WEIGHT: 230 LBS | DIASTOLIC BLOOD PRESSURE: 77 MMHG | TEMPERATURE: 98.5 F | SYSTOLIC BLOOD PRESSURE: 148 MMHG

## 2019-01-31 LAB — POCT - HEMOGLOBIN (HGB), QUANTITATIVE, TRANSCUTANEOUS: 11.5

## 2019-01-31 PROCEDURE — 99204 OFFICE O/P NEW MOD 45 MIN: CPT | Mod: 25

## 2019-01-31 PROCEDURE — 88738 HGB QUANT TRANSCUTANEOUS: CPT

## 2019-01-31 PROCEDURE — 94729 DIFFUSING CAPACITY: CPT

## 2019-01-31 PROCEDURE — 94727 GAS DIL/WSHOT DETER LNG VOL: CPT

## 2019-01-31 PROCEDURE — 94060 EVALUATION OF WHEEZING: CPT

## 2019-01-31 RX ORDER — METHIMAZOLE 10 MG/1
10 TABLET ORAL
Refills: 0 | Status: ACTIVE | COMMUNITY

## 2019-01-31 RX ORDER — ALLOPURINOL 100 MG/1
100 TABLET ORAL
Refills: 0 | Status: ACTIVE | COMMUNITY

## 2019-01-31 RX ORDER — ASPIRIN 325 MG/1
325 TABLET, FILM COATED ORAL
Refills: 0 | Status: ACTIVE | COMMUNITY

## 2019-01-31 NOTE — HISTORY OF PRESENT ILLNESS
[FreeTextEntry1] : 78F with PE diagnosed 9/2018, on eliquis.\par \par Bilateral renal stones in July 2018 with bilateral stent placements. \par \par Diagnosed with enlarged thyroid with multiple nodules 10/2018, suggested biopsy but this has not yet been done.  \par \par Currently denies chest pain, sob, rare cough.  No fever/chills/night sweats.  Reports having gained weight recently.\par \par Former smoker, quit 20 years ago, 1pack per week x ~20 years.\par hx of breast cancer 2005 s/p lumpectomy, chemo/RT \par Had a polyp on a colonoscopy 4 years ago, due for repeat colonoscopy now.\par Denies abnormal vaginal bleeding, last gyn check up was about 5-6 years ago, reportedly normal.  \par

## 2019-01-31 NOTE — PROCEDURE
[FreeTextEntry1] : PFT: Normal spirometry without a significant bronchodilator response.  Lung volumes normal. DLCO normal.\par

## 2019-01-31 NOTE — ASSESSMENT
[FreeTextEntry1] : unclear what precipitated PE, possibly from being ill with renal failure from renal stones? underlying malignancy? thyroid nodules malignant? hx of colonic polyp due for repeat colonoscopy now..\par \par suggest continue full anticoagulation at this time, pt reports she has a follow up regarding thyroid nodules in a week.  would cont full ac pending results of thyroid biopsy first.\par \par will follow up in one month and consider repeat ct scan and decision on continuation of full ac.

## 2019-01-31 NOTE — CONSULT LETTER
[FreeTextEntry1] : Dear ,\par \par I had the pleasure of evaluating your patient, VICKY CHEN today in pulmonary consultation.  Please refer to my attached note for my findings and recommendations.\par \par \par Thank you for allowing me to participate in the care of your patient, please feel free to call with any questions or concerns.\par \par \par Sincerely,\par \par Kacy Hendrix MD\par [facility] \par [facility]\par \par

## 2019-02-05 ENCOUNTER — EMERGENCY (EMERGENCY)
Facility: HOSPITAL | Age: 79
LOS: 1 days | Discharge: HOME | End: 2019-02-05
Attending: EMERGENCY MEDICINE
Payer: MEDICARE

## 2019-02-05 VITALS — WEIGHT: 229.94 LBS | HEIGHT: 63 IN

## 2019-02-05 DIAGNOSIS — Z98.41 CATARACT EXTRACTION STATUS, RIGHT EYE: Chronic | ICD-10-CM

## 2019-02-05 DIAGNOSIS — Z98.42 CATARACT EXTRACTION STATUS, LEFT EYE: Chronic | ICD-10-CM

## 2019-02-05 DIAGNOSIS — Z98.890 OTHER SPECIFIED POSTPROCEDURAL STATES: Chronic | ICD-10-CM

## 2019-02-05 DIAGNOSIS — Z96.651 PRESENCE OF RIGHT ARTIFICIAL KNEE JOINT: Chronic | ICD-10-CM

## 2019-02-05 LAB
ALBUMIN SERPL ELPH-MCNC: 3.5 G/DL — SIGNIFICANT CHANGE UP (ref 3.5–5)
ALP SERPL-CCNC: 102 U/L — SIGNIFICANT CHANGE UP (ref 40–120)
ALT FLD-CCNC: 21 U/L DA — SIGNIFICANT CHANGE UP (ref 10–60)
ANION GAP SERPL CALC-SCNC: 7 MMOL/L — SIGNIFICANT CHANGE UP (ref 5–17)
APTT BLD: 32.9 SEC — SIGNIFICANT CHANGE UP (ref 27.5–36.3)
AST SERPL-CCNC: 19 U/L — SIGNIFICANT CHANGE UP (ref 10–40)
BASOPHILS # BLD AUTO: 0.1 K/UL — SIGNIFICANT CHANGE UP (ref 0–0.2)
BASOPHILS NFR BLD AUTO: 0.8 % — SIGNIFICANT CHANGE UP (ref 0–2)
BILIRUB SERPL-MCNC: 0.5 MG/DL — SIGNIFICANT CHANGE UP (ref 0.2–1.2)
BUN SERPL-MCNC: 34 MG/DL — HIGH (ref 7–18)
CALCIUM SERPL-MCNC: 9.4 MG/DL — SIGNIFICANT CHANGE UP (ref 8.4–10.5)
CHLORIDE SERPL-SCNC: 106 MMOL/L — SIGNIFICANT CHANGE UP (ref 96–108)
CO2 SERPL-SCNC: 28 MMOL/L — SIGNIFICANT CHANGE UP (ref 22–31)
CREAT SERPL-MCNC: 1.8 MG/DL — HIGH (ref 0.5–1.3)
EOSINOPHIL # BLD AUTO: 0.2 K/UL — SIGNIFICANT CHANGE UP (ref 0–0.5)
EOSINOPHIL NFR BLD AUTO: 1.5 % — SIGNIFICANT CHANGE UP (ref 0–6)
FLU A RESULT: SIGNIFICANT CHANGE UP
FLU A RESULT: SIGNIFICANT CHANGE UP
FLUAV AG NPH QL: SIGNIFICANT CHANGE UP
FLUBV AG NPH QL: SIGNIFICANT CHANGE UP
GLUCOSE SERPL-MCNC: 122 MG/DL — HIGH (ref 70–99)
HCT VFR BLD CALC: 38.4 % — SIGNIFICANT CHANGE UP (ref 34.5–45)
HGB BLD-MCNC: 12.3 G/DL — SIGNIFICANT CHANGE UP (ref 11.5–15.5)
INR BLD: 1.76 RATIO — HIGH (ref 0.88–1.16)
LACTATE SERPL-SCNC: 1.1 MMOL/L — SIGNIFICANT CHANGE UP (ref 0.7–2)
LYMPHOCYTES # BLD AUTO: 1.8 K/UL — SIGNIFICANT CHANGE UP (ref 1–3.3)
LYMPHOCYTES # BLD AUTO: 11.8 % — LOW (ref 13–44)
MCHC RBC-ENTMCNC: 27.7 PG — SIGNIFICANT CHANGE UP (ref 27–34)
MCHC RBC-ENTMCNC: 32 GM/DL — SIGNIFICANT CHANGE UP (ref 32–36)
MCV RBC AUTO: 86.6 FL — SIGNIFICANT CHANGE UP (ref 80–100)
MONOCYTES # BLD AUTO: 1.5 K/UL — HIGH (ref 0–0.9)
MONOCYTES NFR BLD AUTO: 9.8 % — SIGNIFICANT CHANGE UP (ref 2–14)
NEUTROPHILS # BLD AUTO: 11.5 K/UL — HIGH (ref 1.8–7.4)
NEUTROPHILS NFR BLD AUTO: 76.1 % — SIGNIFICANT CHANGE UP (ref 43–77)
PLATELET # BLD AUTO: 192 K/UL — SIGNIFICANT CHANGE UP (ref 150–400)
POTASSIUM SERPL-MCNC: 3.6 MMOL/L — SIGNIFICANT CHANGE UP (ref 3.5–5.3)
POTASSIUM SERPL-SCNC: 3.6 MMOL/L — SIGNIFICANT CHANGE UP (ref 3.5–5.3)
PROT SERPL-MCNC: 7.8 G/DL — SIGNIFICANT CHANGE UP (ref 6–8.3)
PROTHROM AB SERPL-ACNC: 19.9 SEC — HIGH (ref 10–12.9)
RBC # BLD: 4.43 M/UL — SIGNIFICANT CHANGE UP (ref 3.8–5.2)
RBC # FLD: 13.9 % — SIGNIFICANT CHANGE UP (ref 10.3–14.5)
RSV RESULT: SIGNIFICANT CHANGE UP
RSV RNA RESP QL NAA+PROBE: SIGNIFICANT CHANGE UP
SODIUM SERPL-SCNC: 141 MMOL/L — SIGNIFICANT CHANGE UP (ref 135–145)
WBC # BLD: 15.1 K/UL — HIGH (ref 3.8–10.5)
WBC # FLD AUTO: 15.1 K/UL — HIGH (ref 3.8–10.5)

## 2019-02-05 PROCEDURE — 71046 X-RAY EXAM CHEST 2 VIEWS: CPT | Mod: 26

## 2019-02-05 PROCEDURE — 99285 EMERGENCY DEPT VISIT HI MDM: CPT

## 2019-02-05 RX ORDER — ACETAMINOPHEN 500 MG
650 TABLET ORAL ONCE
Qty: 0 | Refills: 0 | Status: COMPLETED | OUTPATIENT
Start: 2019-02-05 | End: 2019-02-05

## 2019-02-05 RX ORDER — SODIUM CHLORIDE 9 MG/ML
2000 INJECTION INTRAMUSCULAR; INTRAVENOUS; SUBCUTANEOUS ONCE
Qty: 0 | Refills: 0 | Status: COMPLETED | OUTPATIENT
Start: 2019-02-05 | End: 2019-02-05

## 2019-02-05 RX ORDER — IBUPROFEN 200 MG
400 TABLET ORAL ONCE
Qty: 0 | Refills: 0 | Status: COMPLETED | OUTPATIENT
Start: 2019-02-05 | End: 2019-02-05

## 2019-02-05 RX ADMIN — Medication 650 MILLIGRAM(S): at 22:52

## 2019-02-05 RX ADMIN — SODIUM CHLORIDE 2000 MILLILITER(S): 9 INJECTION INTRAMUSCULAR; INTRAVENOUS; SUBCUTANEOUS at 22:52

## 2019-02-05 RX ADMIN — Medication 400 MILLIGRAM(S): at 22:55

## 2019-02-05 NOTE — ED PROVIDER NOTE - PROGRESS NOTE DETAILS
Work up pending. Will sign out to Dr Yusuf to f/u results, reassess, and make final disposition. patient signedout to me by Dr. Weaver at 10pm. Awaiting labs.  labs show wbc 15, CXR shows LLL infiltrate  discussed above with patient and family. On reeval patient wellappearing, reports improvement of symptoms. No evidence of resp distress/hypoxia. Pt stable for discharge with careful return to ED precautions. DARREL Yusuf MD

## 2019-02-05 NOTE — ED PROVIDER NOTE - OBJECTIVE STATEMENT
77 y/o F with a PMHx DVT, PE (Eliquis), HTN, Hyperthyroidism, kidney stone and a PSHx of knee replacement, cataract extraction, lumpectomy of right breast presents to ED c/o fever x 1 day, associated with throat pain, hoarse voice and non-productive cough with ear pressure. Reports  denying getting flu shot this year. Denies HA, neck pain/ stiffness, rash, SOB, chest pain, urinary complaints or any other acute complaints. Drug Allergy: Penicillin. 77 y/o F with a PMHx DVT, PE (Eliquis), HTN, Hyperthyroidism, kidney stone and a presents with fever x 1 day, associated with throat pain, hoarse voice and non-productive cough with ear pressure. Did not get flu shot this year. Denies HA, neck pain/ stiffness, rash, SOB, chest pain, urinary complaints or any other acute complaints.

## 2019-02-05 NOTE — ED ADULT NURSE NOTE - NSIMPLEMENTINTERV_GEN_ALL_ED
Implemented All Universal Safety Interventions:  [place] to call system. Call bell, personal items and telephone within reach. Instruct patient to call for assistance. Room bathroom lighting operational. Non-slip footwear when patient is off stretcher. Physically safe environment: no spills, clutter or unnecessary equipment. Stretcher in lowest position, wheels locked, appropriate side rails in place.

## 2019-02-05 NOTE — ED PROVIDER NOTE - MEDICAL DECISION MAKING DETAILS
77 y/o F with fever and URI symptoms. Pt meets SIRS criteria. Will initiate septic workup and reassess.

## 2019-02-05 NOTE — ED PROVIDER NOTE - NS ED ROS FT
CONSTITUTIONAL: +fever, no chills   EYES: no visual changes, no eye pain, +discharge    ENMT: no nasal congestion, +throat pain  CARDIOVASCULAR: no chest pain, no edema, no palpitations   RESPIRATORY: no shortness of breath, +cough   GASTROINTESTINAL: no abdominal pain, no nausea, no vomiting, no diarrhea, no constipation   GENITOURINARY: no dysuria, no frequency  MUSCULOSKELETAL: no joint pains, no myalgias, no back pain   SKIN: no rashes  NEUROLOGICAL: no weakness, no headache, no dizziness, no slurred speech, no syncope   PSYCHIATRIC: no known mental health illness   HEME/LYMPH: no lymphadenopathy      All other ROS negative except as per HPI

## 2019-02-05 NOTE — ED PROVIDER NOTE - PHYSICAL EXAMINATION
GENERAL: wells appearing, no acute distress   HEAD: atraumatic   EYES: EOMI, pink conjunctiva, +L eye with green discharge   ENT: moist oral mucosa, no pharyngeal erythema or exudates, TMs non bulging and without erythema   CARDIAC: RRR, no edema, distal pulses present   RESPIRATORY: lungs CTAB, no increased work of breathing   GASTROINTESTINAL: no abdominal tenderness, no rebound or guarding, bowel sounds presents  GENITOURINARY: no CVA tenderness   MUSCULOSKELETAL: no deformity   NEUROLOGICAL: AAOx3, spontaneous movement of extremities   SKIN: intact   PSYCHIATRIC: cooperative  HEME LYMPH: no lymphadenopathy

## 2019-02-05 NOTE — ED PROVIDER NOTE - NSFOLLOWUPINSTRUCTIONS_ED_ALL_ED_FT
Take medications as prescribed.   followup with PMD for reevaluation.    Return to ED if you develop difficulty breathing.

## 2019-02-05 NOTE — ED PROVIDER NOTE - PMH
Breast cancer, right  radiation therapy and chemotherapy  Calculus in bladder    Calculus, ureter    DVT (deep venous thrombosis)  right and left lower extremity  HTN (hypertension)    Kidney stones    PE (pulmonary thromboembolism)

## 2019-02-06 VITALS
DIASTOLIC BLOOD PRESSURE: 52 MMHG | OXYGEN SATURATION: 97 % | TEMPERATURE: 101 F | RESPIRATION RATE: 20 BRPM | HEART RATE: 87 BPM | SYSTOLIC BLOOD PRESSURE: 118 MMHG

## 2019-02-06 PROCEDURE — 80053 COMPREHEN METABOLIC PANEL: CPT

## 2019-02-06 PROCEDURE — 87631 RESP VIRUS 3-5 TARGETS: CPT

## 2019-02-06 PROCEDURE — 85730 THROMBOPLASTIN TIME PARTIAL: CPT

## 2019-02-06 PROCEDURE — 96365 THER/PROPH/DIAG IV INF INIT: CPT

## 2019-02-06 PROCEDURE — 87040 BLOOD CULTURE FOR BACTERIA: CPT

## 2019-02-06 PROCEDURE — 96366 THER/PROPH/DIAG IV INF ADDON: CPT

## 2019-02-06 PROCEDURE — 93005 ELECTROCARDIOGRAM TRACING: CPT

## 2019-02-06 PROCEDURE — 71046 X-RAY EXAM CHEST 2 VIEWS: CPT

## 2019-02-06 PROCEDURE — 85027 COMPLETE CBC AUTOMATED: CPT

## 2019-02-06 PROCEDURE — 96361 HYDRATE IV INFUSION ADD-ON: CPT

## 2019-02-06 PROCEDURE — 36415 COLL VENOUS BLD VENIPUNCTURE: CPT

## 2019-02-06 PROCEDURE — 99284 EMERGENCY DEPT VISIT MOD MDM: CPT | Mod: 25

## 2019-02-06 PROCEDURE — 83605 ASSAY OF LACTIC ACID: CPT

## 2019-02-06 PROCEDURE — 85610 PROTHROMBIN TIME: CPT

## 2019-02-06 RX ORDER — POLYMYXIN B SULF/TRIMETHOPRIM 10000-1/ML
1 DROPS OPHTHALMIC (EYE)
Qty: 10 | Refills: 0 | OUTPATIENT
Start: 2019-02-06 | End: 2019-02-12

## 2019-02-06 RX ORDER — ACETAMINOPHEN 500 MG
2 TABLET ORAL
Qty: 40 | Refills: 0 | OUTPATIENT
Start: 2019-02-06 | End: 2019-02-10

## 2019-02-06 RX ADMIN — Medication 650 MILLIGRAM(S): at 01:09

## 2019-02-06 RX ADMIN — Medication 400 MILLIGRAM(S): at 01:09

## 2019-02-06 RX ADMIN — SODIUM CHLORIDE 2000 MILLILITER(S): 9 INJECTION INTRAMUSCULAR; INTRAVENOUS; SUBCUTANEOUS at 01:38

## 2019-02-11 LAB
CULTURE RESULTS: SIGNIFICANT CHANGE UP
CULTURE RESULTS: SIGNIFICANT CHANGE UP
SPECIMEN SOURCE: SIGNIFICANT CHANGE UP
SPECIMEN SOURCE: SIGNIFICANT CHANGE UP

## 2019-02-19 ENCOUNTER — APPOINTMENT (OUTPATIENT)
Dept: VASCULAR SURGERY | Facility: CLINIC | Age: 79
End: 2019-02-19
Payer: MEDICARE

## 2019-02-19 VITALS
DIASTOLIC BLOOD PRESSURE: 86 MMHG | WEIGHT: 230 LBS | HEIGHT: 62 IN | HEART RATE: 67 BPM | SYSTOLIC BLOOD PRESSURE: 157 MMHG | BODY MASS INDEX: 42.33 KG/M2

## 2019-02-19 PROCEDURE — 93970 EXTREMITY STUDY: CPT

## 2019-02-19 PROCEDURE — 99214 OFFICE O/P EST MOD 30 MIN: CPT

## 2019-02-19 NOTE — PHYSICAL EXAM
[JVD] : no jugular venous distention  [Normal Breath Sounds] : Normal breath sounds [Normal Rate and Rhythm] : normal rate and rhythm [2+] : left 2+ [Ankle Swelling (On Exam)] : present [Ankle Swelling Bilaterally] : bilaterally  [Ankle Swelling On The Left] : moderate [Varicose Veins Of Lower Extremities] : not present [] : not present [Abdomen Tenderness] : ~T ~M No abdominal tenderness [No Rash or Lesion] : No rash or lesion [Skin Ulcer] : no ulcer [Alert] : alert [Calm] : calm [de-identified] : appears well [de-identified] : sensorimotor intact

## 2019-02-19 NOTE — HISTORY OF PRESENT ILLNESS
[FreeTextEntry1] : 78 yo female with history of kidney stones  s/p stent placement was found to have pt dvt was started on asa 375mg daily.  pt reports continued hematuria.  She recently presented with shortness of breath and underwent CT PA which showed pulmonary embolus. She was sent to our [facility] and underwent to admission. She was placed on anticoagulation with worsening hematuria. An IVC filter was placed and patient underwent stone extraction. She currently is doing well and is on anticoagulation. She has a retrievable IVC filter in place

## 2019-02-19 NOTE — ASSESSMENT
[FreeTextEntry1] : 77 yo female with history of kidney stones s/p stent placement Now status post placement of IVC filter. In the office the patient underwent a repeat venous duplex which shows chronic DVT. Patient should remain on Eliquis for 1 more month. She states that hematuria has now subsided. Would continue with IVC filter in place at this time and have pt evaluated by IR for possible retrieval\par \par

## 2019-03-01 ENCOUNTER — APPOINTMENT (OUTPATIENT)
Dept: PULMONOLOGY | Facility: CLINIC | Age: 79
End: 2019-03-01
Payer: MEDICARE

## 2019-03-01 VITALS
OXYGEN SATURATION: 94 % | TEMPERATURE: 98 F | HEART RATE: 72 BPM | SYSTOLIC BLOOD PRESSURE: 148 MMHG | DIASTOLIC BLOOD PRESSURE: 78 MMHG

## 2019-03-01 PROBLEM — I26.99 OTHER PULMONARY EMBOLISM WITHOUT ACUTE COR PULMONALE: Chronic | Status: ACTIVE | Noted: 2019-02-05

## 2019-03-01 PROCEDURE — 99214 OFFICE O/P EST MOD 30 MIN: CPT

## 2019-03-01 NOTE — HISTORY OF PRESENT ILLNESS
[FreeTextEntry1] : 78F with PE diagnosed 9/2018, on eliquis, here for follow up.  Currently denies chest pain, sob, cough.  No fever/chills/night sweats.  \par \par Hx:\par Bilateral renal stones in July 2018 with bilateral stent placements (precipitating event to PE?)\par \par PE september 2018--[facility]-acute another appeared chronic\par \par Diagnosed with enlarged thyroid with multiple nodules 10/2018, suggested biopsy but this has not yet been done but patient reports now being on methimazole, there was a suggestion of surgery at some point but she is not clear why.\par \par CT abd pelvis: calcifeid gallstones, kidney stones/ureteral stents, calcified exophytic uterine mass likely fibroid.  Denies abnormal vaginal bleeding, last gyn check up was about 5-6 years ago, reportedly normal\par \par hx of breast cancer 2005 s/p lumpectomy, chemo/RT \par \par Had a polyp on a colonoscopy 4 years ago, due for repeat colonoscopy now, cannot go bc on full ac.\par \par Former smoker, quit 20 years ago, 1pack per week x ~20 years.\par \par \par

## 2019-03-01 NOTE — ASSESSMENT
[FreeTextEntry1] : Multiple possible etiologies for PE, also evidence of chronic PE, may need life long ac regardless.  would like to see echo to eval pulm htn.\par \par pt with breast ca hx/abnormal thyroid/colonic polyp.  would need to r/o active malignancy before discontinuing full ac.  will need to contact endocrinologist to understand thyroid disease/plan.\par \par Needs mammo/gyn eval for uterine mass. \par \par pt cannot undergo colonoscopy until off ac.   \par \par will touch base with PCP to discuss.

## 2019-04-05 ENCOUNTER — APPOINTMENT (OUTPATIENT)
Dept: PULMONOLOGY | Facility: CLINIC | Age: 79
End: 2019-04-05
Payer: MEDICARE

## 2019-04-05 VITALS
HEART RATE: 78 BPM | SYSTOLIC BLOOD PRESSURE: 130 MMHG | RESPIRATION RATE: 16 BRPM | OXYGEN SATURATION: 95 % | TEMPERATURE: 98.2 F | DIASTOLIC BLOOD PRESSURE: 76 MMHG

## 2019-04-05 DIAGNOSIS — R09.82 POSTNASAL DRIP: ICD-10-CM

## 2019-04-05 PROCEDURE — 94060 EVALUATION OF WHEEZING: CPT

## 2019-04-05 PROCEDURE — 99214 OFFICE O/P EST MOD 30 MIN: CPT

## 2019-04-05 RX ORDER — FLUTICASONE PROPIONATE 50 UG/1
50 SPRAY, METERED NASAL DAILY
Qty: 1 | Refills: 3 | Status: ACTIVE | COMMUNITY
Start: 2019-04-05 | End: 1900-01-01

## 2019-04-05 NOTE — ASSESSMENT
[FreeTextEntry1] : cont eliquis until we are sure no thyroid or gyn malignancy.  Fu echo.  Patient should have IVC filter removed, being arranged by her vascular dr.\par \par FU with .\par \par

## 2019-04-05 NOTE — HISTORY OF PRESENT ILLNESS
[FreeTextEntry1] : 78F with PE diagnosed 9/2018, on eliquis, here for follow up.  Reports some SZYMANSKI.  has cough and pnd.  After last visit spoke with PCP , she was awaiting results from endo/ENT surgeon in regards to possibility of thyroid ca.  She was also going to arrange for echo (patient hasn't had one since diagnosis of PE).  Pt reports she also recently saw gyn who did a transvaginal sono and pap...awaiting results.\par \par Hx:\par Bilateral renal stones in July 2018 with bilateral stent placements (precipitating event to PE?)\par \par PE september 2018--[facility]-acute another appeared chronic\par \par Diagnosed with enlarged thyroid with multiple nodules 10/2018, suggested biopsy but this has not yet been done but patient reports now being on methimazole, there was a suggestion of surgery at some point but she is not clear why.\par \par CT abd pelvis: calcifeid gallstones, kidney stones/ureteral stents, calcified exophytic uterine mass likely fibroid.  Denies abnormal vaginal bleeding, last gyn check up was about 5-6 years ago, reportedly normal\par \par hx of breast cancer 2005 s/p lumpectomy, chemo/RT \par \par Had a polyp on a colonoscopy 4 years ago, due for repeat colonoscopy now, cannot go bc on full ac.\par \par Former smoker, quit 20 years ago, 1pack per week x ~20 years.\par \par \par  Melolabial Interpolation Flap Division And Inset Text: Division and inset of the melolabial interpolation flap was performed to achieve optimal aesthetic result, restore normal anatomic appearance and avoid distortion of normal anatomy, expedite and facilitate wound healing, achieve optimal functional result and because linear closure either not possible or would produce suboptimal result. The patient was prepped and draped in the usual manner. The pedicle was infiltrated with local anesthesia. The pedicle was sectioned with a #15 blade. The pedicle was de-bulked and trimmed to match the shape of the defect. Hemostasis was achieved. The flap donor site and free margin of the flap were secured with deep buried sutures and the wound edges were re-approximated.

## 2019-04-09 ENCOUNTER — RX CHANGE (OUTPATIENT)
Age: 79
End: 2019-04-09

## 2019-04-09 RX ORDER — MOMETASONE 50 UG/1
50 SPRAY, METERED NASAL DAILY
Qty: 3 | Refills: 1 | Status: ACTIVE | COMMUNITY
Start: 2019-04-09 | End: 1900-01-01

## 2019-04-15 NOTE — PROGRESS NOTE ADULT - PROBLEM/PLAN-5
Ostomy Nurse Visit (35 minutes)  See Epic Ostomy Flowsheet for site assessment.     Reason for visit: Patient complains of peristoma pain and irriaition.  Patients visiting nurse tried crusting and that didn't help.  Patient states Convatec stoma paste (purple top) burned so she stopped using that and was using diaper rash cream.  This was hard to remove at todays visit and had to use adhesive remover wipes.  Patient also states appliance leaked when she used a gabi ring in the past.     Assessment:     Peristoma assessment: painful, red, superficial skin breakdown     Effluent: liquid light brown     Appliance evaluation: intact - patient states she has to change daily due to pain and itching around edges     Interventions:             Ileostomy Appliance changed.     Recommendations:  Appliance: 1 3/4 surfit convex with melgisorb to peristoma area     Frequency of change: every 4-7 days and prn for leakage.     Peristomal skin care: cleansed with warm water only, advised patient not to use diaper rash cream or barrier wipes to see if this helps.        Patient states [employer] was out of network with her insurance so order was sent to [facility].  Patient given 3 wafers, melgisorb sheets, medium belt and cohesive paste (blue top) to try.  
DISPLAY PLAN FREE TEXT

## 2019-06-18 ENCOUNTER — LABORATORY RESULT (OUTPATIENT)
Age: 79
End: 2019-06-18

## 2019-06-18 ENCOUNTER — APPOINTMENT (OUTPATIENT)
Age: 79
End: 2019-06-18
Payer: MEDICARE

## 2019-06-18 VITALS
BODY MASS INDEX: 42.33 KG/M2 | TEMPERATURE: 98 F | DIASTOLIC BLOOD PRESSURE: 86 MMHG | OXYGEN SATURATION: 92 % | HEART RATE: 73 BPM | SYSTOLIC BLOOD PRESSURE: 147 MMHG | WEIGHT: 230 LBS | RESPIRATION RATE: 14 BRPM | HEIGHT: 62 IN

## 2019-06-18 PROCEDURE — 36415 COLL VENOUS BLD VENIPUNCTURE: CPT

## 2019-06-18 PROCEDURE — 99214 OFFICE O/P EST MOD 30 MIN: CPT | Mod: 25

## 2019-06-18 NOTE — PHYSICAL EXAM
[General Appearance - In No Acute Distress] : no acute distress [Well Groomed] : well groomed [Heart Sounds] : normal S1 and S2 [] : no respiratory distress [Auscultation Breath Sounds / Voice Sounds] : lungs were clear to auscultation bilaterally

## 2019-06-19 LAB
BASOPHILS # BLD AUTO: 0.04 K/UL
BASOPHILS NFR BLD AUTO: 0.4 %
EOSINOPHIL # BLD AUTO: 0.48 K/UL
EOSINOPHIL NFR BLD AUTO: 5.3 %
HCT VFR BLD CALC: 38.1 %
HGB BLD-MCNC: 11.8 G/DL
IMM GRANULOCYTES NFR BLD AUTO: 1.1 %
LYMPHOCYTES # BLD AUTO: 1.59 K/UL
LYMPHOCYTES NFR BLD AUTO: 17.5 %
MAN DIFF?: NORMAL
MCHC RBC-ENTMCNC: 28 PG
MCHC RBC-ENTMCNC: 31 GM/DL
MCV RBC AUTO: 90.3 FL
MONOCYTES # BLD AUTO: 0.71 K/UL
MONOCYTES NFR BLD AUTO: 7.8 %
NEUTROPHILS # BLD AUTO: 6.14 K/UL
NEUTROPHILS NFR BLD AUTO: 67.9 %
PLATELET # BLD AUTO: 262 K/UL
RBC # BLD: 4.22 M/UL
RBC # FLD: 14.5 %
WBC # FLD AUTO: 9.06 K/UL

## 2019-06-19 NOTE — ADDENDUM
[FreeTextEntry1] : labs returned, cr clearance low, will send for VQ scan instead of CTA, discussed with pt.
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
## 2019-06-24 ENCOUNTER — FORM ENCOUNTER (OUTPATIENT)
Age: 79
End: 2019-06-24

## 2019-06-25 ENCOUNTER — OUTPATIENT (OUTPATIENT)
Dept: OUTPATIENT SERVICES | Facility: HOSPITAL | Age: 79
LOS: 1 days | End: 2019-06-25

## 2019-06-25 ENCOUNTER — OUTPATIENT (OUTPATIENT)
Dept: OUTPATIENT SERVICES | Facility: HOSPITAL | Age: 79
LOS: 1 days | End: 2019-06-25
Payer: MEDICARE

## 2019-06-25 ENCOUNTER — APPOINTMENT (OUTPATIENT)
Dept: RADIOLOGY | Facility: HOSPITAL | Age: 79
End: 2019-06-25
Payer: MEDICARE

## 2019-06-25 ENCOUNTER — APPOINTMENT (OUTPATIENT)
Dept: NUCLEAR MEDICINE | Facility: HOSPITAL | Age: 79
End: 2019-06-25
Payer: MEDICARE

## 2019-06-25 DIAGNOSIS — Z98.890 OTHER SPECIFIED POSTPROCEDURAL STATES: Chronic | ICD-10-CM

## 2019-06-25 DIAGNOSIS — Z96.651 PRESENCE OF RIGHT ARTIFICIAL KNEE JOINT: Chronic | ICD-10-CM

## 2019-06-25 DIAGNOSIS — I26.99 OTHER PULMONARY EMBOLISM WITHOUT ACUTE COR PULMONALE: ICD-10-CM

## 2019-06-25 DIAGNOSIS — Z98.41 CATARACT EXTRACTION STATUS, RIGHT EYE: Chronic | ICD-10-CM

## 2019-06-25 DIAGNOSIS — Z98.42 CATARACT EXTRACTION STATUS, LEFT EYE: Chronic | ICD-10-CM

## 2019-06-25 PROCEDURE — 78582 LUNG VENTILAT&PERFUS IMAGING: CPT | Mod: 26,GC

## 2019-06-25 PROCEDURE — 71046 X-RAY EXAM CHEST 2 VIEWS: CPT | Mod: 26

## 2019-07-25 ENCOUNTER — APPOINTMENT (OUTPATIENT)
Dept: VASCULAR SURGERY | Facility: CLINIC | Age: 79
End: 2019-07-25
Payer: MEDICARE

## 2019-07-25 VITALS
HEART RATE: 72 BPM | DIASTOLIC BLOOD PRESSURE: 80 MMHG | SYSTOLIC BLOOD PRESSURE: 145 MMHG | BODY MASS INDEX: 42.33 KG/M2 | HEIGHT: 62 IN | WEIGHT: 230 LBS

## 2019-07-25 PROCEDURE — 93970 EXTREMITY STUDY: CPT

## 2019-07-25 PROCEDURE — 99213 OFFICE O/P EST LOW 20 MIN: CPT

## 2019-07-25 NOTE — PHYSICAL EXAM
[JVD] : no jugular venous distention  [Normal Breath Sounds] : Normal breath sounds [Normal Rate and Rhythm] : normal rate and rhythm [2+] : left 2+ [Ankle Swelling (On Exam)] : present [Ankle Swelling Bilaterally] : bilaterally  [Ankle Swelling On The Left] : moderate [Varicose Veins Of Lower Extremities] : not present [] : not present [Abdomen Tenderness] : ~T ~M No abdominal tenderness [No Rash or Lesion] : No rash or lesion [Skin Ulcer] : no ulcer [Alert] : alert [Calm] : calm [de-identified] : appears well [de-identified] : sensorimotor intact

## 2019-07-25 NOTE — HISTORY OF PRESENT ILLNESS
[FreeTextEntry1] : 78 yo female with history of kidney stones  s/p stent placement was found to have pt dvt was started on asa 375mg daily.  pt reports continued hematuria.  She recently presented with shortness of breath and underwent CT PA which showed pulmonary embolus. She was sent to our [facility] and underwent to admission. She was placed on anticoagulation with worsening hematuria. An IVC filter was placed and patient underwent stone extraction. She currently is doing well and is on anticoagulation. She has a retrievable IVC filter in place

## 2019-07-25 NOTE — ASSESSMENT
[FreeTextEntry1] : 79 yo female with history of kidney stones s/p stent placement Now status post placement of IVC filter. In the office the patient underwent a repeat venous duplex which shows chronic DVT. Patient should remain on Eliquis for 1 more month. She states that hematuria has now subsided.  pt evaluated by IR for possible retrieval\par \par

## 2019-08-07 ENCOUNTER — APPOINTMENT (OUTPATIENT)
Dept: INTERVENTIONAL RADIOLOGY/VASCULAR | Facility: CLINIC | Age: 79
End: 2019-08-07
Payer: MEDICARE

## 2019-08-07 VITALS
OXYGEN SATURATION: 98 % | HEIGHT: 62 IN | TEMPERATURE: 97.6 F | BODY MASS INDEX: 42.33 KG/M2 | WEIGHT: 230 LBS | SYSTOLIC BLOOD PRESSURE: 149 MMHG | HEART RATE: 76 BPM | DIASTOLIC BLOOD PRESSURE: 81 MMHG

## 2019-08-07 DIAGNOSIS — J30.2 OTHER SEASONAL ALLERGIC RHINITIS: ICD-10-CM

## 2019-08-07 DIAGNOSIS — Z78.9 OTHER SPECIFIED HEALTH STATUS: ICD-10-CM

## 2019-08-07 DIAGNOSIS — M17.12 UNILATERAL PRIMARY OSTEOARTHRITIS, LEFT KNEE: ICD-10-CM

## 2019-08-07 DIAGNOSIS — Z86.39 PERSONAL HISTORY OF OTHER ENDOCRINE, NUTRITIONAL AND METABOLIC DISEASE: ICD-10-CM

## 2019-08-07 DIAGNOSIS — Z85.3 PERSONAL HISTORY OF MALIGNANT NEOPLASM OF BREAST: ICD-10-CM

## 2019-08-07 DIAGNOSIS — Z63.4 DISAPPEARANCE AND DEATH OF FAMILY MEMBER: ICD-10-CM

## 2019-08-07 PROCEDURE — 99204 OFFICE O/P NEW MOD 45 MIN: CPT

## 2019-08-07 RX ORDER — MULTIVITAMIN
CAPSULE ORAL
Refills: 0 | Status: ACTIVE | COMMUNITY

## 2019-08-07 RX ORDER — SPIRONOLACTONE 25 MG/1
25 TABLET ORAL
Refills: 0 | Status: ACTIVE | COMMUNITY

## 2019-08-07 RX ORDER — METOPROLOL TARTRATE 25 MG/1
25 TABLET, FILM COATED ORAL
Refills: 0 | Status: ACTIVE | COMMUNITY

## 2019-08-07 SDOH — SOCIAL STABILITY - SOCIAL INSECURITY: DISSAPEARANCE AND DEATH OF FAMILY MEMBER: Z63.4
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
## 2019-08-07 NOTE — PHYSICAL EXAM
[Restricted in physically strenuous activity but ambulatory and able to carry out work of a light or sedentary nature] : Restricted in physically strenuous activity but ambulatory and able to carry out work of a light or sedentary nature, e.g., light house work, office work [No Acute Distress] : no acute distress [Alert] : alert [Well Nourished] : well nourished [Well Developed] : well developed [Healthy Appearance] : healthy appearance [Normal Voice/Communication] : normal voice communication [Normal Gait] : normal gait [Oriented x3] : oriented to person, place, and time [Normal Affect] : the affect was normal [Normal Insight/Judgement] : insight and judgment were intact [Recent Memory Normal] : recent memory was not impaired [Normal Mood] : the mood was normal [Remote Memory Normal] : remote memory was not impaired [de-identified] : ambulates with a cane

## 2019-08-07 NOTE — ASSESSMENT
[Other: _____] : [unfilled] [FreeTextEntry1] : This is an 79 year old female with hx of renal calculi s/p cysto with stent placement 2018 who developed a RLE DVT & PE, s/p retrievable IVC filter placement 9/2018 @ [facility] with Dr Das. Per pulmonologist Dr Hendrix on 6/26/19, ok to remove filter & eliquis was discontinued 2 weeks ago. BLE dopplers performed on 7/25/19 revealed no BLE DVTs or superficial phlebitis.\par \par The pt denies any future surgery plans & states she has been ambulatory, inclusive of exercising.\par \par Based upon this review I have recommended that the patient move forward with retrievable IVC filter removal.\par Procedural risks, benefits & alternatives were discussed, as well, with their comprehension confirmed. All of their questions were asked & answered to their satisfaction.\par The patient has expressed that she would like to go forward with the procedure.\par \par IR contact information was provided to the pt should there be any additional  questions or concerns that may be addressed & I informed them that the booking office will call to schedule the procedure. \par \par

## 2019-08-07 NOTE — REASON FOR VISIT
[Consultation] : a consultation visit [Source: ______] : History obtained from [unfilled] [Other: _____] : [unfilled] [FreeTextEntry1] : Retrievable IVC Filter Removal

## 2019-08-07 NOTE — REVIEW OF SYSTEMS
[Difficulty Walking] : difficulty walking [Negative] : Heme/Lymph [Limb Pain] : no limb pain [Limb Swelling] : no limb swelling [FreeTextEntry3] : wears eyeglasses [FreeTextEntry9] : left knee pain attributed to OA [de-identified] : secondary to left knee OA

## 2019-08-14 LAB
ANION GAP SERPL CALC-SCNC: 16 MMOL/L
APTT BLD: 28.6 SEC
BASOPHILS # BLD AUTO: 0.07 K/UL
BASOPHILS NFR BLD AUTO: 0.7 %
BUN SERPL-MCNC: 24 MG/DL
CALCIUM SERPL-MCNC: 10.1 MG/DL
CHLORIDE SERPL-SCNC: 98 MMOL/L
CO2 SERPL-SCNC: 28 MMOL/L
CREAT SERPL-MCNC: 1.42 MG/DL
EOSINOPHIL # BLD AUTO: 0.43 K/UL
EOSINOPHIL NFR BLD AUTO: 4.1 %
GLUCOSE SERPL-MCNC: 145 MG/DL
HCT VFR BLD CALC: 39.8 %
HGB BLD-MCNC: 12.9 G/DL
IMM GRANULOCYTES NFR BLD AUTO: 1.3 %
INR PPP: 1.04 RATIO
LYMPHOCYTES # BLD AUTO: 1.64 K/UL
LYMPHOCYTES NFR BLD AUTO: 15.6 %
MAN DIFF?: NORMAL
MCHC RBC-ENTMCNC: 28.3 PG
MCHC RBC-ENTMCNC: 32.4 GM/DL
MCV RBC AUTO: 87.3 FL
MONOCYTES # BLD AUTO: 0.9 K/UL
MONOCYTES NFR BLD AUTO: 8.6 %
NEUTROPHILS # BLD AUTO: 7.3 K/UL
NEUTROPHILS NFR BLD AUTO: 69.7 %
PLATELET # BLD AUTO: 215 K/UL
POTASSIUM SERPL-SCNC: 4 MMOL/L
PT BLD: 11.9 SEC
RBC # BLD: 4.56 M/UL
RBC # FLD: 14.8 %
SODIUM SERPL-SCNC: 141 MMOL/L
WBC # FLD AUTO: 10.48 K/UL

## 2019-08-20 ENCOUNTER — OUTPATIENT (OUTPATIENT)
Dept: OUTPATIENT SERVICES | Facility: HOSPITAL | Age: 79
LOS: 1 days | End: 2019-08-20
Payer: MEDICARE

## 2019-08-20 ENCOUNTER — RX RENEWAL (OUTPATIENT)
Age: 79
End: 2019-08-20

## 2019-08-20 VITALS
RESPIRATION RATE: 16 BRPM | TEMPERATURE: 98 F | HEART RATE: 66 BPM | OXYGEN SATURATION: 98 % | DIASTOLIC BLOOD PRESSURE: 75 MMHG | SYSTOLIC BLOOD PRESSURE: 161 MMHG

## 2019-08-20 VITALS
SYSTOLIC BLOOD PRESSURE: 130 MMHG | HEART RATE: 74 BPM | DIASTOLIC BLOOD PRESSURE: 65 MMHG | RESPIRATION RATE: 16 BRPM | OXYGEN SATURATION: 95 %

## 2019-08-20 DIAGNOSIS — Z98.890 OTHER SPECIFIED POSTPROCEDURAL STATES: Chronic | ICD-10-CM

## 2019-08-20 DIAGNOSIS — Z96.651 PRESENCE OF RIGHT ARTIFICIAL KNEE JOINT: Chronic | ICD-10-CM

## 2019-08-20 DIAGNOSIS — Z98.42 CATARACT EXTRACTION STATUS, LEFT EYE: Chronic | ICD-10-CM

## 2019-08-20 DIAGNOSIS — R60.0 LOCALIZED EDEMA: ICD-10-CM

## 2019-08-20 DIAGNOSIS — Z98.41 CATARACT EXTRACTION STATUS, RIGHT EYE: Chronic | ICD-10-CM

## 2019-08-20 PROCEDURE — C1894: CPT

## 2019-08-20 PROCEDURE — 36010 PLACE CATHETER IN VEIN: CPT

## 2019-08-20 PROCEDURE — C1887: CPT

## 2019-08-20 PROCEDURE — 75825 VEIN X-RAY TRUNK: CPT | Mod: 26,59

## 2019-08-20 PROCEDURE — 75825 VEIN X-RAY TRUNK: CPT

## 2019-08-20 PROCEDURE — C1769: CPT

## 2019-08-20 RX ORDER — FENTANYL CITRATE 50 UG/ML
25 INJECTION INTRAVENOUS
Refills: 0 | Status: DISCONTINUED | OUTPATIENT
Start: 2019-08-20 | End: 2019-08-20

## 2019-08-20 RX ORDER — ACETAMINOPHEN 500 MG
1000 TABLET ORAL ONCE
Refills: 0 | Status: DISCONTINUED | OUTPATIENT
Start: 2019-08-20 | End: 2019-08-20

## 2019-08-20 RX ORDER — ONDANSETRON 8 MG/1
4 TABLET, FILM COATED ORAL ONCE
Refills: 0 | Status: DISCONTINUED | OUTPATIENT
Start: 2019-08-20 | End: 2019-08-20

## 2019-09-03 DIAGNOSIS — T82.868A THROMBOSIS DUE TO VASCULAR PROSTHETIC DEVICES, IMPLANTS AND GRAFTS, INITIAL ENCOUNTER: ICD-10-CM

## 2019-09-19 RX ORDER — APIXABAN 5 MG/1
5 TABLET, FILM COATED ORAL
Qty: 60 | Refills: 3 | Status: ACTIVE | COMMUNITY
Start: 2018-11-20 | End: 1900-01-01

## 2019-10-28 ENCOUNTER — APPOINTMENT (OUTPATIENT)
Dept: GASTROENTEROLOGY | Facility: CLINIC | Age: 79
End: 2019-10-28
Payer: MEDICARE

## 2019-10-28 VITALS
TEMPERATURE: 97.8 F | HEIGHT: 62 IN | BODY MASS INDEX: 41.96 KG/M2 | DIASTOLIC BLOOD PRESSURE: 70 MMHG | HEART RATE: 78 BPM | OXYGEN SATURATION: 96 % | SYSTOLIC BLOOD PRESSURE: 120 MMHG | WEIGHT: 228 LBS

## 2019-10-28 DIAGNOSIS — Z79.01 LONG TERM (CURRENT) USE OF ANTICOAGULANTS: ICD-10-CM

## 2019-10-28 DIAGNOSIS — Z12.11 ENCOUNTER FOR SCREENING FOR MALIGNANT NEOPLASM OF COLON: ICD-10-CM

## 2019-10-28 DIAGNOSIS — Z86.19 PERSONAL HISTORY OF OTHER INFECTIOUS AND PARASITIC DISEASES: ICD-10-CM

## 2019-10-28 DIAGNOSIS — Z12.12 ENCOUNTER FOR SCREENING FOR MALIGNANT NEOPLASM OF COLON: ICD-10-CM

## 2019-10-28 DIAGNOSIS — Z00.00 ENCOUNTER FOR GENERAL ADULT MEDICAL EXAMINATION W/OUT ABNORMAL FINDINGS: ICD-10-CM

## 2019-10-28 DIAGNOSIS — E66.01 MORBID (SEVERE) OBESITY DUE TO EXCESS CALORIES: ICD-10-CM

## 2019-10-28 PROCEDURE — 99204 OFFICE O/P NEW MOD 45 MIN: CPT

## 2019-10-28 NOTE — PHYSICAL EXAM
[General Appearance - Alert] : alert [General Appearance - In No Acute Distress] : in no acute distress [Sclera] : the sclera and conjunctiva were normal [PERRL With Normal Accommodation] : pupils were equal in size, round, and reactive to light [Extraocular Movements] : extraocular movements were intact [Outer Ear] : the ears and nose were normal in appearance [Oropharynx] : the oropharynx was normal [Neck Appearance] : the appearance of the neck was normal [Neck Cervical Mass (___cm)] : no neck mass was observed [Jugular Venous Distention Increased] : there was no jugular-venous distention [Thyroid Diffuse Enlargement] : the thyroid was not enlarged [Thyroid Nodule] : there were no palpable thyroid nodules [Auscultation Breath Sounds / Voice Sounds] : lungs were clear to auscultation bilaterally [Heart Rate And Rhythm] : heart rate was normal and rhythm regular [Heart Sounds] : normal S1 and S2 [Heart Sounds Gallop] : no gallops [Murmurs] : no murmurs [Heart Sounds Pericardial Friction Rub] : no pericardial rub [Bowel Sounds] : normal bowel sounds [Abdomen Soft] : soft [Abdomen Tenderness] : non-tender [] : no hepato-splenomegaly [Abdomen Mass (___ Cm)] : no abdominal mass palpated [Normal Sphincter Tone] : normal sphincter tone [No Rectal Mass] : no rectal mass [Occult Blood Positive] : stool was negative for occult blood [Cervical Lymph Nodes Enlarged Posterior Bilaterally] : posterior cervical [Cervical Lymph Nodes Enlarged Anterior Bilaterally] : anterior cervical [Supraclavicular Lymph Nodes Enlarged Bilaterally] : supraclavicular [No CVA Tenderness] : no ~M costovertebral angle tenderness [No Spinal Tenderness] : no spinal tenderness [Abnormal Walk] : normal gait [Nail Clubbing] : no clubbing  or cyanosis of the fingernails [Musculoskeletal - Swelling] : no joint swelling seen [Motor Tone] : muscle strength and tone were normal [Deep Tendon Reflexes (DTR)] : deep tendon reflexes were 2+ and symmetric [Sensation] : the sensory exam was normal to light touch and pinprick [No Focal Deficits] : no focal deficits [Oriented To Time, Place, And Person] : oriented to person, place, and time [Impaired Insight] : insight and judgment were intact [Affect] : the affect was normal

## 2019-10-28 NOTE — CONSULT LETTER
[Dear  ___] : Dear  [unfilled], [Consult Letter:] : I had the pleasure of evaluating your patient, [unfilled]. [Please see my note below.] : Please see my note below. [Consult Closing:] : Thank you very much for allowing me to participate in the care of this patient.  If you have any questions, please do not hesitate to contact me. [Sincerely,] : Sincerely, [FreeTextEntry3] : Frankie Thomas MD, FACG\par

## 2019-10-28 NOTE — ASSESSMENT
[FreeTextEntry1] : As long a patient needs to be on the Eliquis she is not able to undergo a coloscnoy. However, she is 79, her recent colonoscopy was negative or had diminutive colon polyp removed, and her Cologuard testing id negative , there is no urgency in her undergoing the procedure at this time.

## 2019-10-28 NOTE — HISTORY OF PRESENT ILLNESS
[FreeTextEntry1] : Ms. Galan is a 24 year-old woman with a history of migraine headache, idiopathic intracranial hypertension, anxiety presenting for follow-up of elevated prolactin. I saw her for an initial visit in October 2018.\par \par Elevated prolactin.\par She noted an increase in breast size and bilateral expressible milky nipple discharge starting in January 2018. She had regular menstrual periods every 28 days.\par Prolactin was noted to be elevated on two occasions in March and June 2018,  78.2 ng/mL (normal 4.8-23.3) and 88.3 ng/mL, respectively.\par MRI pituitary in August 2018 demonstrated a partially empty sella but no pituitary masses. \par Laboratory evaluation in October 2018 showed an elevated prolactin at 87.2 pg/mL (normal: 3.4-24.1) with no significant macroprolactin and otherwise normal pituitary function.\par She was not taking any medications at the time of her initial visit. She has a history of depression and anxiety, although she had not required any psychotropic medications since the fall of 2017.\par Her maternal grandmother has a history of type 2 diabetes mellitus. No other known history of endocrine disease or tumors.\par \par Interim History \par Laboratory evaluation from last visit as below. 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 \par She finished her degree and has a new job. She is working overnight. \par She has had a return of her migraine headaches and recent anxiety she attributes her her new job. She went to the emergency department for migraine headache and had a dystonic reaction to compazine. She is seeing a neurologist for migraine headache and will be having a repeat MRI later this week. She is seeing a therapist and psychiatrist for her anxiety.\par She continues to have expressible galactorrhea and breast fullness. Her menstrual period was late last month and she had a shorter duration of bleeding this month. Her last menstrual period started March 16th.\par She notes acne, currently under good control. She denies headaches, change in weight, change in vision, diarrhea/constipation, hair/skin changes. 10-point review of systems otherwise negative in detail.\par Medical and surgical history, medications, allergies, social and family history reviewed and updated as needed. [Heartburn] : denies heartburn [Nausea] : denies nausea [Vomiting] : denies vomiting [Diarrhea] : denies diarrhea [Constipation] : stable constipation [Yellow Skin Or Eyes (Jaundice)] : denies jaundice [Abdominal Pain] : denies abdominal pain [Abdominal Swelling] : denies abdominal swelling [Rectal Pain] : denies rectal pain [Kidney Stone] : kidney stone [Wt Gain ___ Lbs] : no recent weight gain [Wt Loss ___ Lbs] : no recent weight loss [GERD] : no gastroesophageal reflux disease [Hiatus Hernia] : no hiatus hernia [Peptic Ulcer Disease] : no peptic ulcer disease [Pancreatitis] : no pancreatitis [Cholelithiasis] : no cholelithiasis [Inflammatory Bowel Disease] : no inflammatory bowel disease [Irritable Bowel Syndrome] : no irritable bowel syndrome [Diverticulitis] : no diverticulitis [Alcohol Abuse] : no alcohol abuse [Malignancy] : no malignancy [Abdominal Surgery] : no abdominal surgery [Appendectomy] : no appendectomy [Cholecystectomy] : no cholecystectomy [de-identified] : 79 year old woman presents for a screening colonoscopy. She has a past history  of colon polyps. Her most recent colonoscopy in 2104 she had a diminutive rectal polyp removed. She had a recent Cologuard test that was negative. She is currently on Eliquis for a PE. She underwent renal  stenting for kidney stones in 2018 and this was complicated by the development of DVT and PE. She had an IVC filter placed in 9/2018. Recently IR attempted to remove the filter but this was unsuccessful due to retained clot. She has therefore remained on the Eliquis for an indefinite time period. She denies rectal bleeding, melena or hematemesis.

## 2019-11-12 ENCOUNTER — APPOINTMENT (OUTPATIENT)
Dept: PULMONOLOGY | Facility: CLINIC | Age: 79
End: 2019-11-12

## 2019-11-27 ENCOUNTER — APPOINTMENT (OUTPATIENT)
Dept: PULMONOLOGY | Facility: CLINIC | Age: 79
End: 2019-11-27
Payer: MEDICARE

## 2019-11-27 VITALS
SYSTOLIC BLOOD PRESSURE: 121 MMHG | DIASTOLIC BLOOD PRESSURE: 70 MMHG | OXYGEN SATURATION: 92 % | HEART RATE: 70 BPM | TEMPERATURE: 97.6 F

## 2019-11-27 DIAGNOSIS — I26.99 OTHER PULMONARY EMBOLISM W/OUT ACUTE COR PULMONALE: ICD-10-CM

## 2019-11-27 DIAGNOSIS — Z95.828 PRESENCE OF OTHER VASCULAR IMPLANTS AND GRAFTS: ICD-10-CM

## 2019-11-27 DIAGNOSIS — I82.491 ACUTE EMBOLISM AND THROMBOSIS OF OTHER SPECIFIED DEEP VEIN OF RIGHT LOWER EXTREMITY: ICD-10-CM

## 2019-11-27 PROCEDURE — 99214 OFFICE O/P EST MOD 30 MIN: CPT

## 2019-11-27 NOTE — HISTORY OF PRESENT ILLNESS
[FreeTextEntry1] : went for IVC filter removal but procedure aborted due to remaining clot on filter.  No pulmonary complaints.  Now on eliquis for life. \par \par Other hx:\par Bilateral renal stones in July 2018 with bilateral stent placements (precipitating event to PE?)\par \par PE September 2018--RUL-acute another appeared chronic\par \par Diagnosed with enlarged thyroid with multiple nodules 10/2018, suggested biopsy but this has not yet been done but patient reports now being on methimazole, there was a suggestion of surgery and she is to fu with ENT surgeon in feb 2020.\par \par CT abd pelvis: calcified gallstones, kidney stones/ureteral stents, calcified exophytic uterine mass likely fibroid. Denies abnormal vaginal bleeding, last gyn check up was about 5-6 years ago, reportedly normal\par \par hx of breast cancer 2005 s/p lumpectomy, chemo/RT \par \par Had a polyp on a colonoscopy 4 years ago, due for repeat colonoscopy but bc of anticoagulation this is nonurgent, cologuard was neg, had fu with GI last month.\par \par Former smoker, quit 20 years ago, 1pack per week x ~20 years.

## 2019-11-27 NOTE — PROCEDURE
[FreeTextEntry1] : PFT: Normal spirometry without a significant bronchodilator response.  Lung volumes normal. DLCO normal.\par \par \par Impression: \par Inferior venacavogram demonstrates clot within the IVC filter. Dr. Das \par was informed of the findings immediately following the procedure and \par anticoagulation therapy will be initiated. The findings and implications \par were discussed with the patient and her son. \par \par \par EXAM: NM PULM VENTILATION PERFUS IMG \par \par \par PROCEDURE DATE: Jun 25 2019 \par \par \par INTERPRETATION: CLINICAL INFORMATION: 79-year-old female with cough, \par reported history of prior PE, evaluate for pulmonary embolism. History of \par breast cancer \par \par RADIOPHARMACEUTICAL: 1 mCi Tc-99m-DTPA by inhalation; 6.1 mCi Tc-99m-MAA, \par I.V. \par \par TECHNIQUE: Ventilation and perfusion images of the lungs were obtained \par following administration of Tc-99m-DTPA and Tc-99m-MAA. Images were obtained \par in the anterior, posterior, both lateral, and all 4 oblique projections. \par This study was interpreted in conjunction with a chest radiograph of \par 20/5/2019. \par \par COMPARISON: No prior lung V/Q scan available. \par \par FINDINGS: There is a nonsegmental defect in the posteromedial aspects of of \par both mid lungs. There is heterogeneous radiotracer distribution in the \par remainder of both lungs. There are no segmental perfusion defects. \par \par IMPRESSION: Ventilation/perfusion lung scan: Very low probability of \par pulmonary embolus. \par

## 2019-11-27 NOTE — ASSESSMENT
[FreeTextEntry1] : PE resolved but with chronic DVT/clot around IVC filter, to remain on eliquis indefinitely.\par no active pulmonary disease at this time. \par cont fu with endo/ent surgeon\par vascular\par

## 2020-01-06 ENCOUNTER — RESULT REVIEW (OUTPATIENT)
Age: 80
End: 2020-01-06

## 2020-02-04 NOTE — H&P ADULT - NSHPPOADEEPVENOUSTHROMB_GEN_A_CORE
[address], [place] [address] [facility]  [address] [place] [id number] [phone]  [address]  [facility] [address] [place] [address]    February 4, 2020       To Whom It May Concern:    Lizandro Perez was seen at our office on 2/4/2020 for an exam with Nayana Davila MD.  Excuse her for illness today and then as needed this week if fevers persist.    Sincerely,         ____________________________________________  Nayana Davila MD
2/4/2020      PATIENT:  Mary Vega  PARENT: Neo Landers    To Whom it May Concern: This is to certify that this parent is to be excused from work today due to his child's medical appointment. Mary Vega was seen on 2/4/2020. Please excuse Karen  from work today.      Thank you,         SIGNATURE:___________________________________________________________                                                   Tanna Medina MD        [id number] [place] [address] Noelle Keane [address] [phone]
2/4/2020      PATIENT:  Verner Robes  PARENT: Jon Cheng    To Whom it May Concern: This is to certify that this parent is to be excused from work today due to her child's medical appointment. Verner Robes was seen on 2/4/2020. Please excuse Cristobal Patterson  from work today.      Thank you,         SIGNATURE:___________________________________________________________                                                   Margret Bell MD        [id number] [place] [address] [place] [address] [phone]
suspected

## 2020-02-26 NOTE — CONSULT NOTE ADULT - NEGATIVE GENERAL SYMPTOMS
Additional views negative on right - benign calcifications   Recheck one yr  Copy via [employer]
no sweating/no chills/no anorexia/no fever

## 2020-06-29 NOTE — PROGRESS NOTE ADULT - PROVIDER SPECIALTY LIST ADULT
I discussed with the patient by phone. She has been started on Methylprednisolone for her autoimmune issues. Will repeat the CBC after she has been on the meds for 2 weeks. If improving, will plan to continue to follow.   If there is no improvement, we w
Internal Medicine
Nephrology
Urology
Nephrology
Urology
Nephrology
Infectious Disease
Nephrology
Internal Medicine
Internal Medicine

## 2020-12-23 PROBLEM — Z12.11 ENCOUNTER FOR COLORECTAL CANCER SCREENING: Status: RESOLVED | Noted: 2019-10-28 | Resolved: 2020-12-23

## 2021-01-01 NOTE — DIETITIAN INITIAL EVALUATION ADULT. - WEIGHT IN KG
Name: Jan Valdes  (female B)  52 days old, CGA 39w2d  Birth: 2023 at 3:31 PM    Gestational Age: 31w6d, 3 lb 15.1 oz (1790 g)                                                               2021     Mat Hx: 27 yrs old, , GBS +,PPROM >3 days, C/S,Di-Di twin gestation. Parents had them tested and the girls are identical.      Last 3 weights:  Vitals:    21 1800 21 1800 21 1800   Weight: 3.135 kg (6 lb 14.6 oz) 3.18 kg (7 lb 0.2 oz) 3.195 kg (7 lb 0.7 oz)                                            Weight change: 0.015 kg (0.5 oz)   Vital signs (past 24 hours)   Temp:  [98.1  F (36.7  C)-98.5  F (36.9  C)] 98.5  F (36.9  C)  Pulse:  [146-168] 160  Resp:  [44-56] 56  BP: ()/(57-73) 101/73  SpO2:  [99 %] 99 %  Intake: 495   Output  x8   Stool x 5   Em/asp  Ml/kg/day    155   goal ml/kg   160   kcal/kg/day   124                     Lines/Tubes:NG    Diet: BM/DBM 24kcal + Emanuel 24  /42/63   Mom is pumping and bottling        FRS              PO 63% (55)       LABS/RESULTS/MEDS PLAN   FEN:   Lab Results   Component Value Date    ALKPHOS 325 (H) 2021    ALKPHOS 399 (H) 2021    VITDT 71 2021    discontinued )   Vit D level 3/8 = 71 (75, 22)        Resp: RA   A/B: 0    Aminophylline 2/kg/dose TID 2/15-                      CV: Intermittent Murmur   Echo:  PFO with left to Right flow. There is a tiny PDA-left to right shunt No follow up planned  [] higher systolic BP noted, monitor BP in upper extremities   ID: Date Cultures/Labs Treatment (# of days)   3/5 Covid - screening NEG    1/15 bld cx     Oral thrush       COVID screen Q Friday     Heme:   Lab Results   Component Value Date    HGB 8.8 (L) 2021    HGB 9.2 (L) 2021    TAMIR 126 2021    RETP 4.4 (H) 2021       FeSO4 4 mg/kg  daily      GI/  Jaundice:  resolved    Neuro: HUS:  nL           HUS 2/15 Nl    Endo: NMS: .   normal    2.  normal   3.  -  Nl    Exam: General: Normally responsive to exam.  HEENT:  Anterior fontanel soft and flat, sutures approx. No thrush in mouth.  Resp:  Breath sounds clear and equal bilaterally. No increased work of breathing.   CV:  RRR, Soft murmur appreciated today LLSB. Brisk capillary refill.  GI:  Abdomen soft and flat, audible bowel sounds.  Neuro: Tone symmetric and AGA.  Skin: Pink, warm, intact.    Parents update Mother updated by phone after rounds by NNP    ROP/  HCM: Immunization History   Administered Date(s) Administered     Hep B, Peds or Adolescent 2021      ROP exam 2/11:  Zone 2, stage 0 follow up week of 3/4   3/4 ROP: ROP Zone 3, Stage 0 - Nicely developed. Follow up in 6 months.    CCHD passed 1/29    CST ____     Hearing _passed 2/19  F/U after discharge:  []ROP F/U in 6 months   [] NICU F/U at 4 months    PCP: Anastacia Linder  [facility]   [address] [facility] [address]  Telephone [phone]  Fax [phone]     JENNIFER Borges CNP 2021 3:18 PM   50

## 2021-04-01 NOTE — PATIENT PROFILE ADULT. - NS PRO PT REFERRAL QUES 2 YN
DISCHARGE INSTRUCTIONS GIVEN TO PATIENT AT BEDSIDE AND ENCOURAGED TO CONTINUE MEDICATIONS 
AND WOUND CARE AT HOME. INSTRUCTED TO SEEK MEDICAL HELP INCASE OF EMERGENCIES, INSTRUCTED TO 
FOLLOW UP WITH PCP INCASE OF WORSENING WOUND CONDITION. REMOVED ID BANDS AND IV INTACT AND 
NO BLEEDING. CHANGED PT OWN CLOTHES AND ESCORTED TO FRONT LOBBY VIA WHEELCHAIR. PT IS GOING 
HOME PT IS STABLE. no

## 2021-05-07 NOTE — CONSULT NOTE ADULT - ASSESSMENT
PAUL obstructive uroapthy- Nephrolithiasis.  Atrophy of left kidney.    Follow intake and output.  Follow lab in am, with PO4 and magnesium  Urine for culture.    CHD due to PAUL - follow fluid status in the next 24 hours.    Stone workup as per Urology. Cyclophosphamide Pregnancy And Lactation Text: This medication is Pregnancy Category D and it isn't considered safe during pregnancy. This medication is excreted in breast milk.

## 2021-06-16 ENCOUNTER — APPOINTMENT (OUTPATIENT)
Dept: UROLOGY | Facility: CLINIC | Age: 81
End: 2021-06-16
Payer: MEDICARE

## 2021-06-16 PROCEDURE — 99214 OFFICE O/P EST MOD 30 MIN: CPT

## 2021-06-21 LAB — BACTERIA UR CULT: ABNORMAL

## 2021-06-22 LAB — URINE CYTOLOGY: NORMAL

## 2021-06-24 ENCOUNTER — EMERGENCY (EMERGENCY)
Facility: HOSPITAL | Age: 81
LOS: 1 days | Discharge: HOME | End: 2021-06-24
Attending: EMERGENCY MEDICINE
Payer: MEDICARE

## 2021-06-24 VITALS
HEART RATE: 78 BPM | DIASTOLIC BLOOD PRESSURE: 79 MMHG | WEIGHT: 227.52 LBS | TEMPERATURE: 98 F | SYSTOLIC BLOOD PRESSURE: 136 MMHG | RESPIRATION RATE: 16 BRPM | HEIGHT: 59.84 IN | OXYGEN SATURATION: 97 %

## 2021-06-24 VITALS
RESPIRATION RATE: 18 BRPM | DIASTOLIC BLOOD PRESSURE: 77 MMHG | OXYGEN SATURATION: 97 % | HEART RATE: 87 BPM | TEMPERATURE: 99 F | SYSTOLIC BLOOD PRESSURE: 131 MMHG

## 2021-06-24 DIAGNOSIS — Z98.890 OTHER SPECIFIED POSTPROCEDURAL STATES: Chronic | ICD-10-CM

## 2021-06-24 DIAGNOSIS — Z98.41 CATARACT EXTRACTION STATUS, RIGHT EYE: Chronic | ICD-10-CM

## 2021-06-24 DIAGNOSIS — Z96.651 PRESENCE OF RIGHT ARTIFICIAL KNEE JOINT: Chronic | ICD-10-CM

## 2021-06-24 DIAGNOSIS — Z98.42 CATARACT EXTRACTION STATUS, LEFT EYE: Chronic | ICD-10-CM

## 2021-06-24 LAB
ALBUMIN SERPL ELPH-MCNC: 3.5 G/DL — SIGNIFICANT CHANGE UP (ref 3.5–5)
ALP SERPL-CCNC: 58 U/L — SIGNIFICANT CHANGE UP (ref 40–120)
ALT FLD-CCNC: 19 U/L DA — SIGNIFICANT CHANGE UP (ref 10–60)
ANION GAP SERPL CALC-SCNC: 6 MMOL/L — SIGNIFICANT CHANGE UP (ref 5–17)
AST SERPL-CCNC: 18 U/L — SIGNIFICANT CHANGE UP (ref 10–40)
BILIRUB SERPL-MCNC: 0.4 MG/DL — SIGNIFICANT CHANGE UP (ref 0.2–1.2)
BUN SERPL-MCNC: 32 MG/DL — HIGH (ref 7–18)
CALCIUM SERPL-MCNC: 8.9 MG/DL — SIGNIFICANT CHANGE UP (ref 8.4–10.5)
CHLORIDE SERPL-SCNC: 103 MMOL/L — SIGNIFICANT CHANGE UP (ref 96–108)
CK SERPL-CCNC: 52 U/L — SIGNIFICANT CHANGE UP (ref 21–215)
CO2 SERPL-SCNC: 28 MMOL/L — SIGNIFICANT CHANGE UP (ref 22–31)
CREAT SERPL-MCNC: 1.47 MG/DL — HIGH (ref 0.5–1.3)
GLUCOSE SERPL-MCNC: 109 MG/DL — HIGH (ref 70–99)
HCT VFR BLD CALC: 36.7 % — SIGNIFICANT CHANGE UP (ref 34.5–45)
HGB BLD-MCNC: 11.9 G/DL — SIGNIFICANT CHANGE UP (ref 11.5–15.5)
MCHC RBC-ENTMCNC: 28.1 PG — SIGNIFICANT CHANGE UP (ref 27–34)
MCHC RBC-ENTMCNC: 32.4 GM/DL — SIGNIFICANT CHANGE UP (ref 32–36)
MCV RBC AUTO: 86.6 FL — SIGNIFICANT CHANGE UP (ref 80–100)
NRBC # BLD: 0 /100 WBCS — SIGNIFICANT CHANGE UP (ref 0–0)
PLATELET # BLD AUTO: 206 K/UL — SIGNIFICANT CHANGE UP (ref 150–400)
POTASSIUM SERPL-MCNC: 3.7 MMOL/L — SIGNIFICANT CHANGE UP (ref 3.5–5.3)
POTASSIUM SERPL-SCNC: 3.7 MMOL/L — SIGNIFICANT CHANGE UP (ref 3.5–5.3)
PROT SERPL-MCNC: 7.6 G/DL — SIGNIFICANT CHANGE UP (ref 6–8.3)
RBC # BLD: 4.24 M/UL — SIGNIFICANT CHANGE UP (ref 3.8–5.2)
RBC # FLD: 13.8 % — SIGNIFICANT CHANGE UP (ref 10.3–14.5)
SODIUM SERPL-SCNC: 137 MMOL/L — SIGNIFICANT CHANGE UP (ref 135–145)
TROPONIN I SERPL-MCNC: <0.015 NG/ML — SIGNIFICANT CHANGE UP (ref 0–0.04)
WBC # BLD: 10.78 K/UL — HIGH (ref 3.8–10.5)
WBC # FLD AUTO: 10.78 K/UL — HIGH (ref 3.8–10.5)

## 2021-06-24 PROCEDURE — 85027 COMPLETE CBC AUTOMATED: CPT

## 2021-06-24 PROCEDURE — 70450 CT HEAD/BRAIN W/O DYE: CPT | Mod: 26,MA

## 2021-06-24 PROCEDURE — 93005 ELECTROCARDIOGRAM TRACING: CPT

## 2021-06-24 PROCEDURE — 90715 TDAP VACCINE 7 YRS/> IM: CPT

## 2021-06-24 PROCEDURE — 82962 GLUCOSE BLOOD TEST: CPT

## 2021-06-24 PROCEDURE — 36415 COLL VENOUS BLD VENIPUNCTURE: CPT

## 2021-06-24 PROCEDURE — 84484 ASSAY OF TROPONIN QUANT: CPT

## 2021-06-24 PROCEDURE — 99285 EMERGENCY DEPT VISIT HI MDM: CPT

## 2021-06-24 PROCEDURE — 70450 CT HEAD/BRAIN W/O DYE: CPT

## 2021-06-24 PROCEDURE — 80053 COMPREHEN METABOLIC PANEL: CPT

## 2021-06-24 PROCEDURE — 82550 ASSAY OF CK (CPK): CPT

## 2021-06-24 PROCEDURE — 93010 ELECTROCARDIOGRAM REPORT: CPT

## 2021-06-24 PROCEDURE — 90471 IMMUNIZATION ADMIN: CPT

## 2021-06-24 PROCEDURE — 99284 EMERGENCY DEPT VISIT MOD MDM: CPT | Mod: 25

## 2021-06-24 RX ORDER — TETANUS TOXOID, REDUCED DIPHTHERIA TOXOID AND ACELLULAR PERTUSSIS VACCINE, ADSORBED 5; 2.5; 8; 8; 2.5 [IU]/.5ML; [IU]/.5ML; UG/.5ML; UG/.5ML; UG/.5ML
0.5 SUSPENSION INTRAMUSCULAR ONCE
Refills: 0 | Status: COMPLETED | OUTPATIENT
Start: 2021-06-24 | End: 2021-06-24

## 2021-06-24 RX ADMIN — TETANUS TOXOID, REDUCED DIPHTHERIA TOXOID AND ACELLULAR PERTUSSIS VACCINE, ADSORBED 0.5 MILLILITER(S): 5; 2.5; 8; 8; 2.5 SUSPENSION INTRAMUSCULAR at 23:06

## 2021-06-24 NOTE — ED ADULT NURSE NOTE - CAS EDN DISCHARGE ASSESSMENT
PMH of lupus nephritis, reports lower leg/pedal edema, back pain, and HTN at home worsening since yesterday. Denies blurred vision or headache, was previously on HTN medications, not currently. Denies other PMH
Alert and oriented to person, place and time

## 2021-06-24 NOTE — ED PROVIDER NOTE - ENMT, MLM
1 cm laceration on occipital scalp. Airway patent, Nasal mucosa clear. Mouth with normal mucosa. Throat has no vesicles, no oropharyngeal exudates and uvula is midline.

## 2021-06-24 NOTE — ED PROVIDER NOTE - OBJECTIVE STATEMENT
80 y/o female on Eliquis for blood clots, comes in with fall in kitchen. She fell and hit the back of her head on a sharp corner. Patient denies any loss of consciousness or other injuries. Patient allergic to penicillin (rash). 82 y/o female on Eliquis for blood clots, IVC filter, comes in with fall in kitchen. She is not sure why she fell but she got dizzy and realized she was falling, no loss of consciousness. She fell and hit the back of her head on a sharp corner. Patient denies any loss of consciousness or other injuries. Patient allergic to penicillin (rash).

## 2021-06-24 NOTE — ED PROVIDER NOTE - PATIENT PORTAL LINK FT
You can access the FollowMyHealth Patient Portal offered by [facility] by registering at the following website: [email or web address]. By joining [facility]’s FollowMyHealth portal, you will also be able to view your health information using other applications (apps) compatible with our system.

## 2021-06-24 NOTE — ED PROVIDER NOTE - MUSCULOSKELETAL, MLM
No rib tenderness to palpation. Spine appears normal, range of motion is not limited, no muscle or joint tenderness

## 2021-06-24 NOTE — ED ADULT TRIAGE NOTE - CHIEF COMPLAINT QUOTE
c/o s/p fall and hit the head to the corner of the wall due to dizziness with laceration to the back of the head , onset 6 pm as per patient

## 2021-06-24 NOTE — ED PROVIDER NOTE - CLINICAL SUMMARY MEDICAL DECISION MAKING FREE TEXT BOX
Patient with fall. CAT scan is normal. Update tetanus and close laceration. Home with PCP followup. Patient with fall. CAT scan is normal. Update tetanus and close laceration. Home with PCP followup. well appearing, ate a tray in the Emergency Department.  home with son. patient instructed to return for staple removal in 10-14 days

## 2021-06-24 NOTE — ED PROVIDER NOTE - NSFOLLOWUPINSTRUCTIONS_ED_ALL_ED_FT
Staple Care    WHAT YOU NEED TO KNOW:    Staples are often used to close a wound. Your staples may be placed for 3 to 14 days, depending on the location of your wound.    DISCHARGE INSTRUCTIONS:    Care for your wound:   •Clean: ?You may be able to shower in 24 hours. Do not soak your wound under water.      ?Gently wash your wound with soap and warm water daily. Lightly pat it dry. Do not cover your wound unless your healthcare provider tells you to.       ?You may also need to clean your wound with a mixture of hydrogen peroxide and water. Ask how to do this.      ?Do not apply ointment or cream to the wound unless your healthcare provider tells you to.      •Elevate: ?Rest any arm or leg that has a wound on pillows above the level of your heart. Do this as often as possible for 2 days. This will help decrease swelling and pain, and help you heal faster.           •Minimize scarring: ?Avoid sunshine on your wound to reduce scarring.             Follow up with your healthcare provider as directed: You may need to return for a wound checkup 3 days after your staples are placed. Ask when you should return to get your staples removed.    Staple removal:   •A medical staple remover will be used to take out your staples. Your healthcare provider will slide the tool under each staple, squeeze the handle, and gently pull the staple out.      •Medical tape will be placed on your wound once your staples are removed. This will help keep your wound closed. The medical tape will fall off on its own after several days.      Contact your healthcare provider if:   •You have redness, pain, swelling, or pus draining from your wound.      •Your pain medicine does not relieve your pain.      •You have a fever of 101°F (38.5°C) or higher.      •You have an odor coming from your wound.      •You have questions or concerns about your condition or care.      Seek care immediately if:   •Your wound reopens.      •You have red streaks on your skin that spread out from your wound.      •You have severe pain or vomiting.

## 2021-06-28 NOTE — HISTORY OF PRESENT ILLNESS
[FreeTextEntry1] : cc blood in urine \par 82 yo fem h/o stones c/o blood in urine last 3 days \par on eliquis \par no dysuria \par h/o kidney and bladder stones

## 2021-06-30 ENCOUNTER — APPOINTMENT (OUTPATIENT)
Dept: UROLOGY | Facility: CLINIC | Age: 81
End: 2021-06-30
Payer: MEDICARE

## 2021-06-30 DIAGNOSIS — R31.0 GROSS HEMATURIA: ICD-10-CM

## 2021-06-30 PROCEDURE — 99213 OFFICE O/P EST LOW 20 MIN: CPT | Mod: 25

## 2021-06-30 PROCEDURE — 52000 CYSTOURETHROSCOPY: CPT

## 2021-07-05 NOTE — ED ADULT TRIAGE NOTE - TEMPERATURE IN CELSIUS (DEGREES C)
DISPLAY PLAN FREE TEXT DISPLAY PLAN FREE TEXT DISPLAY PLAN FREE TEXT DISPLAY PLAN FREE TEXT DISPLAY PLAN FREE TEXT 36.8 DISPLAY PLAN FREE TEXT

## 2021-07-12 PROBLEM — R31.0 GROSS HEMATURIA: Status: ACTIVE | Noted: 2021-06-16

## 2021-07-12 NOTE — ED PROVIDER NOTE - RATE
Chief Complaint:  Chief Complaint   Patient presents with    Nasal Congestion     Has had a runny nose for about 4 days. She also developed a croupy cough (she is only coughing every now and then) yesterday. Mom says she is also tugging on her ear.  Cough       HPI  [address] arrives to office today for evaluation of cough and runny nose. Mom provides history. She states Sharron began having a runny nose and cough starting about 4-5 days ago. Her cough sounds wet. Mom has been trying to suction her nose and giving benadryl before bed which helps somewhat. Did give motrin as well. Has not had any fevers. Yesterday, Sharron began tugging on her ears, so mom wants to make sure there is not an ear infection. Otherwise, Urmila Reese has continued to be active and playful. Drinking well but appetite is lessened. Still having normal voids and stools. REVIEW OF SYSTEMS    Review of Systems   ROS: A comprehensive 12 system review of systems was negative except for where noted in the HPI      PAST MEDICAL HISTORY    Past Medical History:   Diagnosis Date    Term birth of infant 2022       FAMILYHISTORY    No family history on file. SURGICAL HISTORY    No past surgical history on file. CURRENT MEDICATIONS    Current Outpatient Medications   Medication Sig Dispense Refill    amoxicillin (AMOXIL) 400 MG/5ML suspension Take 5.6 mLs by mouth 2 times daily for 10 days 112 mL 0    Lactobacillus (PROBIOTIC CHILDRENS) PACK Take by mouth (Patient not taking: Reported on 6/21/2021)      ibuprofen (ADVIL;MOTRIN) 100 MG/5ML suspension Take by mouth every 4 hours as needed for Fever (Patient not taking: Reported on 6/21/2021)      diphenhydrAMINE (BENYLIN) 12.5 MG/5ML liquid Take by mouth 4 times daily as needed for Allergies (Patient not taking: Reported on 6/21/2021)       No current facility-administered medications for this visit.        ALLERGIES    No Known Allergies    PHYSICAL EXAM   Vitals:    07/12/21 1646   Temp: 97.9 °F (36.6 °C)   Weight: 21 lb 12.8 oz (9.888 kg)   Height: 31.5\" (80 cm)     Physical Exam   VitalSigns:  Temperature 97.9 °F (36.6 °C), height 31.5\" (80 cm), weight 21 lb 12.8 oz (9.888 kg). 64 %ile (Z= 0.36) based on WHO (Girls, 0-2 years) weight-for-age data using vitals from 7/12/2021. 88 %ile (Z= 1.19) based on WHO (Girls, 0-2 years) Length-for-age data based on Length recorded on 7/12/2021. GEN: well-developed, well-nourished, no acute distress, smiling and active  HEAD: normocephalic, atraumatic  EYES: no injection or discharge, PERRL, EOMI  ENT: left TM erythematous, right TM bulging with erythema and purulent fluid behind TM, nasal congestion present, MMM, no lesions  NECK: supple without lymphadenopathy  RESP: clear to auscultation bilaterally, no respiratory distress, no retractions  CVS: regular rate and rhythm, no murmurs, palpable pulses, well perfused  GI: soft, non-tender, non-distended  EXT: peripheral pulses normal, no cyanosis or edema  SKIN: warm, dry, no rashes or lesions    ASSESSMENT AND PLAN   Diagnosis Orders   1. Non-recurrent acute serous otitis media of right ear  amoxicillin (AMOXIL) 400 MG/5ML suspension   2. Viral URI     - Symptoms likely related to viral illness with secondary right OM  - Should begin amoxicillin BID x 10 days  - Continue symptomatic treatment such as motrin, nasal saline and suctioning  - Encourage good hydration  - Patient has appointment in 1 month for well check, will assess ears at that time  - Mom to call with any worsening symptoms. Parent understands and agrees with plan with all questions answered.     Patient Instructions   Begin amoxicillin twice daily for 10 days  Nasal saline and suctioning as needed  May continue benadryl as needed  May use tylenol or motrin as needed for pain or fever 88

## 2021-07-26 NOTE — CONSULT NOTE ADULT - GENERAL GENITOURINARY SYMPTOMS
7/21 1st attempt.  Unable to LVM for patient to reschedule their WM appointments with Dr. Serna.  Dr. Serna is no longer practicing out of  and has moved to the [facility] in [place].  Patient can see Dr. Arshad or Dr. Orozco here in .  If they would like to stay with Dr. Serna, please have them call [phone] to schedule there.    Please assist patient in rescheduling their appointments.      Thanks,    Lawanda Hilliard  Pediatric Specialty /Adult Endocrinology  [facility]  
7/26 2nd attempt.  Unable to LVM for patient to reschedule their WM appointments with Dr. Serna.  Dr. Serna is no longer practicing out of  and has moved to the [facility] in [place].  Patient can see Dr. Arshad or Dr. Orozco here in .  If they would like to stay with Dr. Serna, please have them call [phone] to schedule there.     Please assist patient in rescheduling their appointments.        Thanks,    Lawanda Hilliard  Pediatric Specialty /Adult Endocrinology  [facility]  
dysuria/incontinence/increased urinary frequency

## 2021-08-04 ENCOUNTER — APPOINTMENT (OUTPATIENT)
Dept: UROLOGY | Facility: CLINIC | Age: 81
End: 2021-08-04
Payer: MEDICARE

## 2021-08-04 PROCEDURE — 99213 OFFICE O/P EST LOW 20 MIN: CPT

## 2021-08-05 LAB
APPEARANCE: ABNORMAL
BACTERIA: ABNORMAL
BILIRUBIN URINE: NEGATIVE
BLOOD URINE: ABNORMAL
COLOR: YELLOW
GLUCOSE QUALITATIVE U: NEGATIVE
HYALINE CASTS: 1 /LPF
KETONES URINE: NEGATIVE
LEUKOCYTE ESTERASE URINE: ABNORMAL
MICROSCOPIC-UA: NORMAL
NITRITE URINE: POSITIVE
PH URINE: 6
PROTEIN URINE: ABNORMAL
RED BLOOD CELLS URINE: 44 /HPF
SPECIFIC GRAVITY URINE: 1.01
SQUAMOUS EPITHELIAL CELLS: 1 /HPF
URINE COMMENTS: NORMAL
UROBILINOGEN URINE: NORMAL
WHITE BLOOD CELLS URINE: >720 /HPF

## 2021-08-06 ENCOUNTER — NON-APPOINTMENT (OUTPATIENT)
Age: 81
End: 2021-08-06

## 2021-08-06 RX ORDER — CIPROFLOXACIN HYDROCHLORIDE 500 MG/1
500 TABLET, FILM COATED ORAL
Qty: 14 | Refills: 0 | Status: ACTIVE | COMMUNITY
Start: 2021-06-21 | End: 1900-01-01

## 2021-08-09 LAB — BACTERIA UR CULT: ABNORMAL

## 2021-08-19 NOTE — HISTORY OF PRESENT ILLNESS
[FreeTextEntry1] : cc dysuria \par 80 yo fem h/o stones\par on eliquis \par h/o kidney and bladder stones\par reports dysuria treated for uti by pmd

## 2021-09-03 ENCOUNTER — APPOINTMENT (OUTPATIENT)
Dept: UROLOGY | Facility: CLINIC | Age: 81
End: 2021-09-03
Payer: MEDICARE

## 2021-09-03 DIAGNOSIS — R30.0 DYSURIA: ICD-10-CM

## 2021-09-03 DIAGNOSIS — N20.9 URINARY CALCULUS, UNSPECIFIED: ICD-10-CM

## 2021-09-03 PROCEDURE — 99213 OFFICE O/P EST LOW 20 MIN: CPT

## 2021-09-06 LAB — BACTERIA UR CULT: ABNORMAL

## 2021-09-06 RX ORDER — CIPROFLOXACIN HYDROCHLORIDE 500 MG/1
500 TABLET, FILM COATED ORAL TWICE DAILY
Qty: 10 | Refills: 0 | Status: ACTIVE | COMMUNITY
Start: 2021-09-06 | End: 1900-01-01

## 2021-09-09 ENCOUNTER — NON-APPOINTMENT (OUTPATIENT)
Age: 81
End: 2021-09-09

## 2021-10-06 PROBLEM — R30.0 DYSURIA: Status: ACTIVE | Noted: 2021-08-04

## 2021-10-06 PROBLEM — N20.9 UROLITHIASIS: Status: ACTIVE | Noted: 2018-07-24

## 2021-10-06 NOTE — PHYSICAL EXAM
[General Appearance - Well Developed] : well developed [General Appearance - Well Nourished] : well nourished [Normal Appearance] : normal appearance [Well Groomed] : well groomed [General Appearance - In No Acute Distress] : no acute distress [Abdomen Soft] : soft [Abdomen Tenderness] : non-tender [Costovertebral Angle Tenderness] : no ~M costovertebral angle tenderness [Urinary Bladder Findings] : the bladder was normal on palpation [Edema] : no peripheral edema [] : no respiratory distress [Exaggerated Use Of Accessory Muscles For Inspiration] : no accessory muscle use [Respiration, Rhythm And Depth] : normal respiratory rhythm and effort [Oriented To Time, Place, And Person] : oriented to person, place, and time [Affect] : the affect was normal [Mood] : the mood was normal [Normal Station and Gait] : the gait and station were normal for the patient's age [Not Anxious] : not anxious [No Palpable Adenopathy] : no palpable adenopathy [No Focal Deficits] : no focal deficits

## 2021-10-14 NOTE — ASSESSMENT
[FreeTextEntry1] : repeat ucx \par ct reviewed \par would like to observe nonobstructing stones \par advise risk of sepsis if stone obstructs with infectiion \par to er asap for fever flank pain

## 2021-10-14 NOTE — HISTORY OF PRESENT ILLNESS
[FreeTextEntry1] : cc f/u uti/stones\par 80 yo fem h/o stones\par on eliquis \par h/o kidney and bladder stones\par reports dysuria treated for uti by pmd \par treated for uti last visit \par \par CT SMALL NONOBSTRUCTING STONES BOTH KIDNEYS

## 2021-10-28 ENCOUNTER — APPOINTMENT (OUTPATIENT)
Dept: VASCULAR SURGERY | Facility: CLINIC | Age: 81
End: 2021-10-28
Payer: MEDICARE

## 2021-10-28 VITALS — OXYGEN SATURATION: 92 % | HEART RATE: 72 BPM | DIASTOLIC BLOOD PRESSURE: 70 MMHG | SYSTOLIC BLOOD PRESSURE: 130 MMHG

## 2021-10-28 DIAGNOSIS — I89.0 LYMPHEDEMA, NOT ELSEWHERE CLASSIFIED: ICD-10-CM

## 2021-10-28 DIAGNOSIS — I87.2 VENOUS INSUFFICIENCY (CHRONIC) (PERIPHERAL): ICD-10-CM

## 2021-10-28 DIAGNOSIS — Z86.711 PERSONAL HISTORY OF PULMONARY EMBOLISM: ICD-10-CM

## 2021-10-28 DIAGNOSIS — Z86.718 PERSONAL HISTORY OF OTHER VENOUS THROMBOSIS AND EMBOLISM: ICD-10-CM

## 2021-10-28 DIAGNOSIS — Z87.891 PERSONAL HISTORY OF NICOTINE DEPENDENCE: ICD-10-CM

## 2021-10-28 PROCEDURE — 99213 OFFICE O/P EST LOW 20 MIN: CPT

## 2021-11-09 PROBLEM — Z86.718 HISTORY OF DVT (DEEP VEIN THROMBOSIS): Status: RESOLVED | Noted: 2021-11-09 | Resolved: 2021-11-09

## 2021-11-09 PROBLEM — I87.2 CHRONIC VENOUS INSUFFICIENCY: Status: ACTIVE | Noted: 2021-11-09

## 2021-11-09 PROBLEM — I89.0 LYMPHEDEMA OF BOTH LOWER EXTREMITIES: Status: ACTIVE | Noted: 2021-11-09

## 2021-11-09 PROBLEM — Z86.711 HISTORY OF PULMONARY EMBOLISM: Status: RESOLVED | Noted: 2021-11-09 | Resolved: 2021-11-09

## 2021-12-20 NOTE — H&P ADULT - PROBLEM/PLAN-2
Consult    Subjective:     Ximena Rhodes is a 78 y. o.right-handed  female seen today for evaluation at the request of Dr. Rod Cardenas of new complaint of some unsteadiness and balance difficulty, that caused her to have a fall recently, but she did not seem to injure herself other than just bruised her hip, but her friends note that she seems unsteady walking, but she denies any weakness or numbness in her feet, just occasionally tends to lose her balance. Her carotid Doppler studies done 1 year ago showed mild disease only and we will repeat those again just to make sure in about 2 weeks, and her MRI scan done in 2009 just showed some mild microvascular disease and age-related changes, and we may need to repeat that again if she does not get better with physical therapy. She has not had any seizures either, and is currently on Vimpat 100 mg twice a day and tolerating it well. She thinks it may be partly the medication that makes her unsteady. She does have some irritability and a little hyper but is better on the Lexapro 10 mg a day and she might have to have that increased but we will defer that to Dr. Rod Cardenas her PCP. Patient had a normal 24-hour ambulatory EEG 3 years ago. We discussed the pros and cons of coming off the medication the patient decided she wants to keep on the medication she is doing well, no seizures, no side effects, she has gained some weight, but her irritability is markedly improved on the Lexapro she wants to continue that so her Vimpat and her Lexapro are both renewed for the patient today. She has had no new neurologic problems either, no focal weakness, just the weight gain a little bit, and advised her to try to exercise more watch her diet, could possibly be from the Lexapro but usually that is weight neutral, we will see how she does. She is currently on Vimpat 100 mg twice a day tolerating it well.   She has had no side effects on the medication, and is tolerating it well otherwise, and still able to get it from her mail order pharmacy at a reasonable price. She's had no new numbness or weakness in her arms and legs, no headache, no visual problems, no other bulbar symptoms, or neck pain or back pain. She does try to exercise regularly, and does take vitamins. Her R04 and folic acid levels were normal in 2012. She has no signs of neuropathy. She actually walks fairly well and has only a very mild difficulty with tandem walking, but has a negative Romberg. She had EEGs in the past that showed a left temporal abnormality. She  Is thought to have complex partial seizures. She had an MRI scan that showed microvascular disease only that was somewhat prominent. Her Dopplers done 2 year ago have shown mild less than 50% disease bilaterally. She will continue a baby aspirin for her microvascular disease, but she has had no strokelike symptoms  Patient's  unfortunately passed away recently from pneumonia. A complete review of systems in symptoms was negative for any other new medical problems, complications or illnesses. She has a past history of goiter, allergies, osteoporosis, SVT, DJD, GERD, reduced hearing, hyperlipidemia, hysterectomy, detached retina repair, colonoscopy, rectocele surgery esophageal dilatation and lip surgery for squamous cell cancer.     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    Right lower lip, s/p moh's surgery (10/30/12)     Seizures ([address]     last seizure 2015    SVT (supraventricular tachycardia) ([address]     as of 10/14/14 pt unaware she has ever had this and does not see a cardiologist    Tinnitus of both ears 3/2/2020      Past Surgical History:   Procedure Laterality Date    HX CATARACT REMOVAL Right 2016    HX CATARACT REMOVAL Left 2016    HX COLONOSCOPY  10/7/2010         HX ENDOSCOPY  10/7/2010         HX HYSTERECTOMY      NIDIA 1974 - ovaries in,vaginal repair    HX MOHS PROCEDURES  - bx of right lower lip, 10/12- s/p mohs for SCC    HX RETINAL DETACHMENT REPAIR Left     detached retina, with laser repair    [place] REPAIR OF RECTOCELE      surgery for rectocele and prolapse  - Dr Elicia Peraza     Family History   Problem Relation Age of Onset    Heart Attack Mother 64    Lung Cancer Father     Colon Cancer Sister     No Known Problems Son     No Known Problems Daughter     Anesth Problems Neg Hx       Social History     Tobacco Use    Smoking status: Former Smoker     Packs/day: 0.50     Years: 20.00     Pack years: 10.00     Types: Cigarettes     Quit date: 10/30/1969     Years since quittin.1    Smokeless tobacco: Never Used   Substance Use Topics    Alcohol use: No       Current Outpatient Medications   Medication Sig Dispense Refill    escitalopram oxalate (LEXAPRO) 10 mg tablet TAKE ONE TABLET BY MOUTH ONE TIME DAILY  90 Tablet 0    lacosamide (VIMPAT) 100 mg tab tablet Take 1 Tablet by mouth two (2) times a day. Max Daily Amount: 200 mg. 180 Tablet 5    omeprazole (PRILOSEC) 40 mg capsule TAKE 1 CAPSULE DAILY 90 Cap 1    montelukast (SINGULAIR) 10 mg tablet Take 1 Tab by mouth daily. 90 Tab 1    simvastatin (ZOCOR) 40 mg tablet TAKE 1 TABLET BY MOUTH NIGHTLY 90 Tab 1    CHOLECALCIFEROL, VITAMIN D3, (VITAMIN D3 PO) Take  by mouth.  Denosumab (PROLIA) 60 mg/mL injection 60 mg by SubCUTAneous route. Every 6 months      calcium-vitamin D (CALCIUM 500+D) 500 mg(1,250mg) -200 unit per tablet Take 1 Tablet by mouth two (2) times daily (with meals).           Allergies   Allergen Reactions    Fosamax [Alendronate] Other (comments)     GERD    Keppra [Levetiracetam] Other (comments)     \"IT DIDN'T WORK\"    Lyrica [Pregabalin] Unknown (comments)    Pravastatin Other (comments)     Leg pain        Review of Systems:  A comprehensive review of systems was negative except for: Ears, nose, mouth, throat, and face: positive for hearing loss and sore mouth  Musculoskeletal: positive for myalgias, arthralgias and stiff joints  Neurological: positive for dizziness, seizures, coordination problems and gait problems     Objective:     I      NEUROLOGICAL EXAM:    Appearance: The patient is well developed, well nourished, provides a coherent history and is in no acute distress. Mental Status: Oriented to time, place and person, and the president, fund of knowledge and cognitive function seems normal, speech is without aphasia or dysarthria. Mood and affect appropriate but slightly anxious and depressed. Cranial Nerves:   Intact visual fields. Fundi are benign, but poorly seen. JUSTIN, EOM's full, no nystagmus, no ptosis. Facial sensation is normal. Corneal reflexes are not tested. Facial movement is symmetric. Hearing is reduced bilaterally. Palate is midline with normal sternocleidomastoid and trapezius muscles are normal. Tongue is midline. Neck without meningismus or bruits  Temporal arteries are not tender or enlarged   Motor:  5/5 strength in upper and lower proximal and distal muscles. Normal bulk and tone. No fasciculations. Rapid alternating movement is symmetric and essentially normal   Reflexes:   Deep tendon reflexes 1+/4 and symmetrical. No Babinski or clonus present   Sensory:   Normal to touch, pinprick and DSS, and vibration mildly decreased in both feet. Gait:  Abnormal gait as patient is a little unsteady on Romberg and tandem. Tremor:   No tremor noted.    Cerebellar:   Mildly abnormal cerebellar signs present on tandem walking, and Romberg testing, finger-nose-finger examination shows no tremor   Neurovascular:  Normal heart sounds and regular rhythm, peripheral pulses mildly reduced in both feet, and no carotid bruits. 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 Because of her seizures in the past she will continue her Vimpat 100 mg twice a day, and that was just renewed for her, and her last levels were therapeutic so we will just continue that dose. Patient with mild unsteadiness walking, will try physical therapy, and if she does not get better we may need to check a B12 level and repeat her MRI of the brain again to make sure there is no other structural cause. For her microvascular disease we will repeat a carotid Doppler study since this 1 year since her last study should do that in 2 weeks. We encouraged her to stay active and exercise regularly and call us if there is any change in her condition. [facility] we advised her to stay mentally and physically active and try to exercise 3-3 and half hours a week, and stay mentally active also. Gait disorder most likely secondary to mild senile gait apraxia  Patient does not want to increase her medications because she had side effects at higher doses in the past  Patient has relatively prominent microvascular disease on MRI   Patient to continue taking her vitamins and vitamin D every day, start exercising more and see if she improves. Patient is to continue Vimpat 100 mg b.i.d.    Followup in 12 months time or earlier as needed, and patient to call if any problem, and to get her test results    Signed By: Tanika Blackwell MD     December 20, 2021 DISPLAY PLAN FREE TEXT

## 2022-10-11 NOTE — H&P ADULT - LYMPH NODES
Tazorac Counseling:  Patient advised that medication is irritating and drying.  Patient may need to apply sparingly and wash off after an hour before eventually leaving it on overnight.  The patient verbalized understanding of the proper use and possible adverse effects of tazorac.  All of the patient's questions and concerns were addressed. detailed exam

## 2022-11-07 NOTE — H&P PST ADULT - AS O2 DELIVERY
There were no signs of an emergency medical condition on completion of today's workup.  You will need further medical care and evaluation. A presumptive diagnosis of COVID-19 has been made, however further evaluation may be required by your primary care doctor or specialist for a definitive diagnosis.      Follow up with your medical doctor in 2-3 days or call our clinic at [phone] and state you were seen in the Emergency Department and would like to be seen in clinic. You may also call [phone] to speak with a representative to assist follow up care with medicine, surgery, or specialists.    If you are having pain, take Tylenol/acetaminophen 1 g every six hours and supplement (if allowed by your physician, and if you're not having gastric/gastrointestinal/stomach/intestinal problems) with ibuprofen 600 mg, with food or milk/maalox, every six hours which can be taken three hours apart from the Tylenol to have a layered effect.     Be sure to take no more than 4000mg or 4g of Tylenol/acetaminophen in a 24 hour period. Be sure to check your other medications to see if they include Tylenol/acetaminophen and include them in your calculations to ensure you do not take more than 4000mg or 4g of Tylenol/acetaminophen a day.    Drink at least 2 Liters or 64 Ounces of water each day (UNLESS you are supposed to restrict fluids or have a history of congestive heart failure (CHF)).    Return for any persistent, worsening symptoms, or ANY concerns at all. room air

## 2023-06-30 NOTE — ED PROVIDER NOTE - NS ED ATTENDING STATEMENT MOD
Anesthesia Post Evaluation    Patient: Emre Marquez    Procedure(s) Performed: Procedure(s) (LRB):  COLONOSCOPY (N/A)    Final Anesthesia Type: general      Patient location during evaluation: GI PACU  Patient participation: Yes- Able to Participate  Level of consciousness: awake and alert and oriented  Post-procedure vital signs: reviewed and stable  Pain management: adequate  Airway patency: patent    PONV status at discharge: No PONV  Anesthetic complications: no      Cardiovascular status: blood pressure returned to baseline and hemodynamically stable  Respiratory status: unassisted, spontaneous ventilation and room air  Hydration status: euvolemic  Follow-up not needed.          Vitals Value Taken Time   /69 06/30/23 0940   Temp 36.7 °C (98.1 °F) 06/30/23 0910   Pulse 64 06/30/23 0940   Resp 20 06/30/23 0940   SpO2 99 % 06/30/23 0940         Event Time   Out of Recovery 09:41:08         Pain/Pia Score: Pia Score: 9 (6/30/2023  9:10 AM)         Attending Only

## 2023-07-08 NOTE — DISCHARGE NOTE ADULT - FUNCTIONAL SCREEN CURRENT LEVEL: BATHING, MLM
(0) independent 37yo female, domiciled with mother; unemployed; no dependents, past psychiatric history of psychotic depression with catatonia and post-traumatic stress disorder, one prior psych admission Nov 2021, follows at [facility] outpatient, no hx suicide attempt, hx SIB by cutting, no known hx of violence, no known substance use, hx trauma, BIB EMS activated by mother with concern for catatonia. 9.39    On interview in 2W: patient acknowledges not feeling well days leading up to hospitalization. She is unable to state when she was last at her usual state of health, but states that she was doing fine after last hospitalization, that she was working and able to maintain relationships. She states over the last several weeks, she began “disassociating” that she would find herself on ‘autopilot’ for 40-50 min interavls where she was unable to control herself, would find that she was walking in circles. She also reported worsening anxiety over this period of time. She endorses having “thoughts” and differentiates this from hallucinations. She denies VH. Patient denies currently feeling depressed. Denies symptoms of claudia. She states she is adherent to her prescribed medications. She denies substance use. Patient is able to tell me the names and doses of all of her medications and tells me after ED switched her clonazepam to lorazepam, that she felt “much better,” and is hoping to remain on Lorazepam. Patient reports past instances of fleeting Si without intent or plan; she currently denies and is able to contract for safety. Denies HI.      Per ED assessment:     Patient is observed to stare at the floor sitting still, not moving extremities. Makes intermittent eye contact, staring at times, marked speech latency, very slow to respond. Patient is often unable to complete a thought to answer questions. At times appears internally preoccupied and will agree with something even though nothing was asked. After repeating question several times, pt states she is here for chest pain. Reports having anxiety for one month. States she has no desire to eat and has not eaten in many days. Admits to missing medication at least 2-3 days. Denies hearing voices or being fearful. Denies suicidal ideation. Denies using drugs or alcohol. Patient states she has "a lot" of allergies but cannot state what they are (confirmed with mother, no allergies).    Scored 10 on Ribeiro Deyvi - for immobility, mutism, staring, catalepsy, negativism, withdrawal and autonomic abnormality (HTN).   (2) assistive person

## 2024-04-26 NOTE — H&P PST ADULT - NSSUBSTANCEUSE_GEN_ALL_CORE_SD
Patient notified of LUIS Hall's response below via [employer] message. Medication sent to pharmacy. TSH level ordered for patient.    caffeine

## 2024-06-06 NOTE — PROGRESS NOTE ADULT - PROBLEM/PLAN-6
Detail Level: Simple
Instructions: This plan will send the code FBSE to the PM system.  DO NOT or CHANGE the price.
Price (Do Not Change): 0.00
DISPLAY PLAN FREE TEXT

## 2024-08-01 ENCOUNTER — APPOINTMENT (OUTPATIENT)
Dept: UROLOGY | Facility: CLINIC | Age: 84
End: 2024-08-01
Payer: MEDICARE

## 2024-08-01 VITALS
OXYGEN SATURATION: 91 % | SYSTOLIC BLOOD PRESSURE: 139 MMHG | HEIGHT: 62 IN | DIASTOLIC BLOOD PRESSURE: 76 MMHG | TEMPERATURE: 100.04 F | HEART RATE: 78 BPM

## 2024-08-01 DIAGNOSIS — N20.9 URINARY CALCULUS, UNSPECIFIED: ICD-10-CM

## 2024-08-01 DIAGNOSIS — R82.71 BACTERIURIA: ICD-10-CM

## 2024-08-01 PROCEDURE — 99213 OFFICE O/P EST LOW 20 MIN: CPT

## 2024-08-02 LAB
APPEARANCE: ABNORMAL
BACTERIA: ABNORMAL /HPF
BILIRUBIN URINE: NEGATIVE
BLOOD URINE: ABNORMAL
CAST: 6 /LPF
COARSE GRANULAR CASTS: PRESENT
COLOR: YELLOW
EPITHELIAL CELLS: 8 /HPF
FINE GRANULAR CASTS: PRESENT
GLUCOSE QUALITATIVE U: NEGATIVE MG/DL
HYALINE CASTS: PRESENT
KETONES URINE: NEGATIVE MG/DL
LEUKOCYTE ESTERASE URINE: ABNORMAL
MICROSCOPIC-UA: NORMAL
NITRITE URINE: POSITIVE
PH URINE: 5.5
PROTEIN URINE: NORMAL MG/DL
RED BLOOD CELLS URINE: 53 /HPF
REVIEW: NORMAL
SPECIFIC GRAVITY URINE: 1.01
UROBILINOGEN URINE: 0.2 MG/DL
WHITE BLOOD CELLS URINE: 228 /HPF

## 2024-08-05 PROBLEM — R82.71 BACTERIURIA: Status: ACTIVE | Noted: 2024-08-05

## 2024-08-05 NOTE — HISTORY OF PRESENT ILLNESS
[FreeTextEntry1] : 09/03/2021 cc f/u uti/stones  82 yo fem h/o stones on eliquis h/o kidney and bladder stones reports dysuria treated for uti by pmd treated for uti last visit  CT SMALL NONOBSTRUCTING STONES BOTH KIDNEYS  08/01/2024 cc:UTI  84 year old female presents with UTI. She reports her PCP stated she had a UTI and was given abx states she experienced no improvement and still had bacteria in urine was then referred to urologist. Denies dysuria and hematuria.  PSHx:knee surgery

## 2024-08-05 NOTE — ASSESSMENT
[FreeTextEntry1] : 84 year old female with UTI.  Urinalysis and culture done today. will get f/u ct

## 2024-08-05 NOTE — END OF VISIT
[FreeTextEntry4] : This note was written by Bruna Tracey on 08/01/2024 actively solely Nir Barillas M.D. I, Bruna Tracey, am scribing for and in the presence of Nir Barillas M.D. in the following sections HISTORY OF PRESENT ILLNESS, PAST MEDICAL/FAMILY/SOCIAL HISTORY; REVIEW OF SYSTEMS; VITAL SIGNS; PHYSICAL EXAM; ASSESSMENT/PLAN.  All medical record entries made by this scribe at my, Nir Barillas M.D. direction and personally dictated by me on 08/01/2024. I personally performed the services described in the documentation, reviewed the documentation recorded by the scribe in my presence, and it accurately and completely records my words and actions.

## 2024-08-05 NOTE — HISTORY OF PRESENT ILLNESS
[FreeTextEntry1] : 09/03/2021 cc f/u uti/stones  80 yo fem h/o stones on eliquis h/o kidney and bladder stones reports dysuria treated for uti by pmd treated for uti last visit  CT SMALL NONOBSTRUCTING STONES BOTH KIDNEYS  08/01/2024 cc:UTI  84 year old female presents with UTI. She reports her PCP stated she had a UTI and was given abx states she experienced no improvement and still had bacteria in urine was then referred to urologist. Denies dysuria and hematuria.  PSHx:knee surgery

## 2024-08-05 NOTE — END OF VISIT
[FreeTextEntry4] : This note was written by Bruan Tracey on 08/01/2024 actively solely Nir Barillas M.D. I, Bruna Tracey, am scribing for and in the presence of Nir Barillas M.D. in the following sections HISTORY OF PRESENT ILLNESS, PAST MEDICAL/FAMILY/SOCIAL HISTORY; REVIEW OF SYSTEMS; VITAL SIGNS; PHYSICAL EXAM; ASSESSMENT/PLAN.  All medical record entries made by this scribe at my, Nir Barillas M.D. direction and personally dictated by me on 08/01/2024. I personally performed the services described in the documentation, reviewed the documentation recorded by the scribe in my presence, and it accurately and completely records my words and actions.

## 2024-08-07 ENCOUNTER — APPOINTMENT (OUTPATIENT)
Dept: CT IMAGING | Facility: CLINIC | Age: 84
End: 2024-08-07

## 2024-08-07 PROCEDURE — 74176 CT ABD & PELVIS W/O CONTRAST: CPT | Mod: 26,76

## 2024-08-08 LAB — BACTERIA UR CULT: ABNORMAL

## 2024-08-13 ENCOUNTER — APPOINTMENT (OUTPATIENT)
Dept: UROLOGY | Facility: CLINIC | Age: 84
End: 2024-08-13

## 2024-08-29 ENCOUNTER — APPOINTMENT (OUTPATIENT)
Dept: UROLOGY | Facility: CLINIC | Age: 84
End: 2024-08-29

## 2024-08-29 VITALS
BODY MASS INDEX: 41.96 KG/M2 | HEART RATE: 73 BPM | WEIGHT: 228 LBS | DIASTOLIC BLOOD PRESSURE: 60 MMHG | HEIGHT: 62 IN | OXYGEN SATURATION: 96 % | SYSTOLIC BLOOD PRESSURE: 140 MMHG

## 2024-08-29 PROCEDURE — 99213 OFFICE O/P EST LOW 20 MIN: CPT

## 2024-11-06 NOTE — H&P PST ADULT - VENOUS THROMBOEMBOLISM AGE
Refill Routing Note   Medication(s) are not appropriate for processing by [facility] for the following reason(s):        Outside of protocol    [facility] action(s):  Route             Appointments  past 12m or future 3m with PCP    Date Provider   Last Visit   10/1/2024 Robbin Schmidt MD   Next Visit   1/7/2025 Robbin Schmidt MD   ED visits in past 90 days: 0        Note composed:7:26 AM 11/06/2024                           over 75 yrs

## 2024-11-26 NOTE — PATIENT PROFILE ADULT. - TEACHING/LEARNING FACTORS INFLUENCE READINESS TO LEARN
Date: 2024    To: Kat Crook  : 3/11/1970    I hope this letter finds you doing well.  I am writing to inform you of the following:     The biopsies obtained at the time of your recent endoscopic procedure were benign and showed no evidence of infection or malignancy.       Please call the office at [phone] if there are any questions.    Regards,    Morales Tiwari M.D. none

## 2025-02-12 ENCOUNTER — APPOINTMENT (OUTPATIENT)
Dept: PULMONOLOGY | Facility: CLINIC | Age: 85
End: 2025-02-12
Payer: MEDICARE

## 2025-02-12 VITALS
SYSTOLIC BLOOD PRESSURE: 123 MMHG | WEIGHT: 200 LBS | BODY MASS INDEX: 36.8 KG/M2 | HEIGHT: 62 IN | HEART RATE: 72 BPM | TEMPERATURE: 97.5 F | OXYGEN SATURATION: 90 % | DIASTOLIC BLOOD PRESSURE: 72 MMHG

## 2025-02-12 DIAGNOSIS — J45.40 MODERATE PERSISTENT ASTHMA, UNCOMPLICATED: ICD-10-CM

## 2025-02-12 DIAGNOSIS — R06.02 SHORTNESS OF BREATH: ICD-10-CM

## 2025-02-12 DIAGNOSIS — J44.9 CHRONIC OBSTRUCTIVE PULMONARY DISEASE, UNSPECIFIED: ICD-10-CM

## 2025-02-12 DIAGNOSIS — R06.2 WHEEZING: ICD-10-CM

## 2025-02-12 PROCEDURE — 94060 EVALUATION OF WHEEZING: CPT

## 2025-02-12 PROCEDURE — 71046 X-RAY EXAM CHEST 2 VIEWS: CPT

## 2025-02-12 PROCEDURE — 99205 OFFICE O/P NEW HI 60 MIN: CPT | Mod: 25

## 2025-02-12 RX ORDER — IPRATROPIUM BROMIDE AND ALBUTEROL SULFATE 2.5; .5 MG/3ML; MG/3ML
0.5-2.5 (3) SOLUTION RESPIRATORY (INHALATION)
Qty: 1 | Refills: 5 | Status: ACTIVE | COMMUNITY
Start: 2025-02-12 | End: 1900-01-01

## 2025-02-12 RX ORDER — LOSARTAN POTASSIUM 25 MG/1
25 TABLET, FILM COATED ORAL
Refills: 0 | Status: ACTIVE | COMMUNITY
Start: 2025-02-12

## 2025-02-12 RX ORDER — BUDESONIDE 0.5 MG/2ML
0.5 INHALANT ORAL
Qty: 60 | Refills: 5 | Status: ACTIVE | COMMUNITY
Start: 2025-02-12 | End: 1900-01-01

## 2025-02-12 RX ORDER — BLOOD-GLUCOSE METER
KIT MISCELLANEOUS
Qty: 1 | Refills: 0 | Status: ACTIVE | COMMUNITY
Start: 2025-02-12 | End: 1900-01-01

## 2025-02-12 RX ORDER — ALBUTEROL SULFATE 90 UG/1
108 (90 BASE) INHALANT RESPIRATORY (INHALATION)
Refills: 0 | Status: ACTIVE | COMMUNITY
Start: 2025-02-12

## 2025-03-26 ENCOUNTER — INPATIENT (INPATIENT)
Facility: HOSPITAL | Age: 85
LOS: 4 days | Discharge: NURSING, REHAB OR LONG-TERM CARE | DRG: 179 | End: 2025-03-31
Attending: HOSPITALIST | Admitting: HOSPITALIST
Payer: MEDICARE

## 2025-03-26 VITALS
SYSTOLIC BLOOD PRESSURE: 150 MMHG | DIASTOLIC BLOOD PRESSURE: 78 MMHG | HEART RATE: 107 BPM | WEIGHT: 210.1 LBS | RESPIRATION RATE: 18 BRPM | OXYGEN SATURATION: 97 % | TEMPERATURE: 101 F | HEIGHT: 62 IN

## 2025-03-26 DIAGNOSIS — Z98.42 CATARACT EXTRACTION STATUS, LEFT EYE: Chronic | ICD-10-CM

## 2025-03-26 DIAGNOSIS — Z98.41 CATARACT EXTRACTION STATUS, RIGHT EYE: Chronic | ICD-10-CM

## 2025-03-26 DIAGNOSIS — Z98.890 OTHER SPECIFIED POSTPROCEDURAL STATES: Chronic | ICD-10-CM

## 2025-03-26 DIAGNOSIS — U07.1 COVID-19: ICD-10-CM

## 2025-03-26 DIAGNOSIS — Z96.651 PRESENCE OF RIGHT ARTIFICIAL KNEE JOINT: Chronic | ICD-10-CM

## 2025-03-26 LAB
ALBUMIN SERPL ELPH-MCNC: 2.9 G/DL — LOW (ref 3.5–5)
ALP SERPL-CCNC: 69 U/L — SIGNIFICANT CHANGE UP (ref 40–120)
ALT FLD-CCNC: 19 U/L DA — SIGNIFICANT CHANGE UP (ref 10–60)
ANION GAP SERPL CALC-SCNC: 4 MMOL/L — LOW (ref 5–17)
ANION GAP SERPL CALC-SCNC: 8 MMOL/L — SIGNIFICANT CHANGE UP (ref 5–17)
ANISOCYTOSIS BLD QL: SLIGHT — SIGNIFICANT CHANGE UP
APTT BLD: 33.3 SEC — SIGNIFICANT CHANGE UP (ref 24.5–35.6)
AST SERPL-CCNC: 20 U/L — SIGNIFICANT CHANGE UP (ref 10–40)
BASOPHILS # BLD AUTO: 0.06 K/UL — SIGNIFICANT CHANGE UP (ref 0–0.2)
BASOPHILS NFR BLD AUTO: 0.7 % — SIGNIFICANT CHANGE UP (ref 0–2)
BILIRUB SERPL-MCNC: 0.6 MG/DL — SIGNIFICANT CHANGE UP (ref 0.2–1.2)
BUN SERPL-MCNC: 19 MG/DL — HIGH (ref 7–18)
BUN SERPL-MCNC: 23 MG/DL — HIGH (ref 7–18)
CALCIUM SERPL-MCNC: 6.9 MG/DL — LOW (ref 8.4–10.5)
CALCIUM SERPL-MCNC: 7.7 MG/DL — LOW (ref 8.4–10.5)
CHLORIDE SERPL-SCNC: 105 MMOL/L — SIGNIFICANT CHANGE UP (ref 96–108)
CHLORIDE SERPL-SCNC: 113 MMOL/L — HIGH (ref 96–108)
CO2 SERPL-SCNC: 24 MMOL/L — SIGNIFICANT CHANGE UP (ref 22–31)
CO2 SERPL-SCNC: 27 MMOL/L — SIGNIFICANT CHANGE UP (ref 22–31)
CREAT SERPL-MCNC: 1.12 MG/DL — SIGNIFICANT CHANGE UP (ref 0.5–1.3)
CREAT SERPL-MCNC: 1.17 MG/DL — SIGNIFICANT CHANGE UP (ref 0.5–1.3)
EGFR: 45 ML/MIN/1.73M2 — LOW
EGFR: 45 ML/MIN/1.73M2 — LOW
EGFR: 48 ML/MIN/1.73M2 — LOW
EGFR: 48 ML/MIN/1.73M2 — LOW
EOSINOPHIL # BLD AUTO: 0.03 K/UL — SIGNIFICANT CHANGE UP (ref 0–0.5)
EOSINOPHIL NFR BLD AUTO: 0.4 % — SIGNIFICANT CHANGE UP (ref 0–6)
FLUAV AG NPH QL: SIGNIFICANT CHANGE UP
FLUBV AG NPH QL: SIGNIFICANT CHANGE UP
GLUCOSE SERPL-MCNC: 121 MG/DL — HIGH (ref 70–99)
GLUCOSE SERPL-MCNC: 148 MG/DL — HIGH (ref 70–99)
HCT VFR BLD CALC: 35 % — SIGNIFICANT CHANGE UP (ref 34.5–45)
HGB BLD-MCNC: 11.4 G/DL — LOW (ref 11.5–15.5)
HIV 1 & 2 AB SERPL IA.RAPID: SIGNIFICANT CHANGE UP
HYPOCHROMIA BLD QL: SLIGHT — SIGNIFICANT CHANGE UP
IMM GRANULOCYTES NFR BLD AUTO: 5 % — HIGH (ref 0–0.9)
INR BLD: 1.57 RATIO — HIGH (ref 0.85–1.16)
LACTATE SERPL-SCNC: 1.3 MMOL/L — SIGNIFICANT CHANGE UP (ref 0.7–2)
LYMPHOCYTES # BLD AUTO: 1.05 K/UL — SIGNIFICANT CHANGE UP (ref 1–3.3)
LYMPHOCYTES # BLD AUTO: 12.8 % — LOW (ref 13–44)
MAGNESIUM SERPL-MCNC: 1.3 MG/DL — LOW (ref 1.6–2.6)
MANUAL SMEAR VERIFICATION: SIGNIFICANT CHANGE UP
MCHC RBC-ENTMCNC: 27.9 PG — SIGNIFICANT CHANGE UP (ref 27–34)
MCHC RBC-ENTMCNC: 32.6 G/DL — SIGNIFICANT CHANGE UP (ref 32–36)
MCV RBC AUTO: 85.8 FL — SIGNIFICANT CHANGE UP (ref 80–100)
MICROCYTES BLD QL: SLIGHT — SIGNIFICANT CHANGE UP
MONOCYTES # BLD AUTO: 1.52 K/UL — HIGH (ref 0–0.9)
MONOCYTES NFR BLD AUTO: 18.5 % — HIGH (ref 2–14)
NEUTROPHILS # BLD AUTO: 5.13 K/UL — SIGNIFICANT CHANGE UP (ref 1.8–7.4)
NEUTROPHILS NFR BLD AUTO: 62.6 % — SIGNIFICANT CHANGE UP (ref 43–77)
NRBC BLD AUTO-RTO: 0 /100 WBCS — SIGNIFICANT CHANGE UP (ref 0–0)
PHOSPHATE SERPL-MCNC: 1.5 MG/DL — LOW (ref 2.5–4.5)
PLAT MORPH BLD: NORMAL — SIGNIFICANT CHANGE UP
PLATELET # BLD AUTO: 181 K/UL — SIGNIFICANT CHANGE UP (ref 150–400)
POIKILOCYTOSIS BLD QL AUTO: SLIGHT — SIGNIFICANT CHANGE UP
POTASSIUM SERPL-MCNC: 2.8 MMOL/L — CRITICAL LOW (ref 3.5–5.3)
POTASSIUM SERPL-MCNC: 3.3 MMOL/L — LOW (ref 3.5–5.3)
POTASSIUM SERPL-SCNC: 2.8 MMOL/L — CRITICAL LOW (ref 3.5–5.3)
POTASSIUM SERPL-SCNC: 3.3 MMOL/L — LOW (ref 3.5–5.3)
PROT SERPL-MCNC: 7 G/DL — SIGNIFICANT CHANGE UP (ref 6–8.3)
PROTHROM AB SERPL-ACNC: 18.3 SEC — HIGH (ref 9.9–13.4)
RBC # BLD: 4.08 M/UL — SIGNIFICANT CHANGE UP (ref 3.8–5.2)
RBC # FLD: 14.3 % — SIGNIFICANT CHANGE UP (ref 10.3–14.5)
RBC BLD AUTO: ABNORMAL
RSV RNA NPH QL NAA+NON-PROBE: SIGNIFICANT CHANGE UP
SARS-COV-2 RNA SPEC QL NAA+PROBE: DETECTED
SODIUM SERPL-SCNC: 140 MMOL/L — SIGNIFICANT CHANGE UP (ref 135–145)
SODIUM SERPL-SCNC: 141 MMOL/L — SIGNIFICANT CHANGE UP (ref 135–145)
SPHEROCYTES BLD QL SMEAR: SLIGHT — SIGNIFICANT CHANGE UP
TROPONIN I, HIGH SENSITIVITY RESULT: 8.2 NG/L — SIGNIFICANT CHANGE UP
WBC # BLD: 8.2 K/UL — SIGNIFICANT CHANGE UP (ref 3.8–10.5)
WBC # FLD AUTO: 8.2 K/UL — SIGNIFICANT CHANGE UP (ref 3.8–10.5)

## 2025-03-26 PROCEDURE — 73562 X-RAY EXAM OF KNEE 3: CPT | Mod: 26,LT

## 2025-03-26 PROCEDURE — 99285 EMERGENCY DEPT VISIT HI MDM: CPT

## 2025-03-26 PROCEDURE — 71045 X-RAY EXAM CHEST 1 VIEW: CPT | Mod: 26

## 2025-03-26 PROCEDURE — 99223 1ST HOSP IP/OBS HIGH 75: CPT

## 2025-03-26 PROCEDURE — 93970 EXTREMITY STUDY: CPT | Mod: 26

## 2025-03-26 PROCEDURE — 76705 ECHO EXAM OF ABDOMEN: CPT | Mod: 26

## 2025-03-26 RX ORDER — ACETAMINOPHEN 500 MG/5ML
650 LIQUID (ML) ORAL EVERY 6 HOURS
Refills: 0 | Status: DISCONTINUED | OUTPATIENT
Start: 2025-03-26 | End: 2025-03-27

## 2025-03-26 RX ORDER — FLUTICASONE PROPIONATE 50 UG/1
1 SPRAY, METERED NASAL
Refills: 0 | DISCHARGE

## 2025-03-26 RX ORDER — VANCOMYCIN HCL IN 5 % DEXTROSE 1.5G/250ML
1000 PLASTIC BAG, INJECTION (ML) INTRAVENOUS ONCE
Refills: 0 | Status: COMPLETED | OUTPATIENT
Start: 2025-03-26 | End: 2025-03-26

## 2025-03-26 RX ORDER — BUDESONIDE 90 UG/1
2 AEROSOL, POWDER RESPIRATORY (INHALATION)
Refills: 0 | DISCHARGE

## 2025-03-26 RX ORDER — POTASSIUM PHOSPHATE, MONOBASIC POTASSIUM PHOSPHATE, DIBASIC INJECTION, 236; 224 MG/ML; MG/ML
30 SOLUTION, CONCENTRATE INTRAVENOUS ONCE
Refills: 0 | Status: COMPLETED | OUTPATIENT
Start: 2025-03-26 | End: 2025-03-26

## 2025-03-26 RX ORDER — CEFTRIAXONE 500 MG/1
1000 INJECTION, POWDER, FOR SOLUTION INTRAMUSCULAR; INTRAVENOUS ONCE
Refills: 0 | Status: COMPLETED | OUTPATIENT
Start: 2025-03-26 | End: 2025-03-26

## 2025-03-26 RX ORDER — ALBUTEROL SULFATE 2.5 MG/3ML
2 VIAL, NEBULIZER (ML) INHALATION
Refills: 0 | DISCHARGE

## 2025-03-26 RX ORDER — ACETAMINOPHEN 500 MG/5ML
650 LIQUID (ML) ORAL ONCE
Refills: 0 | Status: COMPLETED | OUTPATIENT
Start: 2025-03-26 | End: 2025-03-26

## 2025-03-26 RX ORDER — IPRATROPIUM BROMIDE AND ALBUTEROL SULFATE .5; 2.5 MG/3ML; MG/3ML
3 SOLUTION RESPIRATORY (INHALATION)
Refills: 0 | DISCHARGE

## 2025-03-26 RX ORDER — METOPROLOL SUCCINATE 50 MG/1
1 TABLET, EXTENDED RELEASE ORAL
Refills: 0 | DISCHARGE

## 2025-03-26 RX ORDER — APIXABAN 2.5 MG/1
1 TABLET, FILM COATED ORAL
Refills: 0 | DISCHARGE

## 2025-03-26 RX ORDER — KETOROLAC TROMETHAMINE 30 MG/ML
15 INJECTION, SOLUTION INTRAMUSCULAR; INTRAVENOUS ONCE
Refills: 0 | Status: DISCONTINUED | OUTPATIENT
Start: 2025-03-26 | End: 2025-03-26

## 2025-03-26 RX ORDER — MAGNESIUM SULFATE 500 MG/ML
2 SYRINGE (ML) INJECTION ONCE
Refills: 0 | Status: COMPLETED | OUTPATIENT
Start: 2025-03-26 | End: 2025-03-26

## 2025-03-26 RX ORDER — LOSARTAN POTASSIUM 100 MG/1
0 TABLET, FILM COATED ORAL
Qty: 0 | Refills: 1 | DISCHARGE

## 2025-03-26 RX ORDER — LOSARTAN POTASSIUM 100 MG/1
1 TABLET, FILM COATED ORAL
Refills: 0 | DISCHARGE

## 2025-03-26 RX ADMIN — Medication 25 GRAM(S): at 22:38

## 2025-03-26 RX ADMIN — Medication 250 MILLIGRAM(S): at 16:54

## 2025-03-26 RX ADMIN — Medication 100 MILLIEQUIVALENT(S): at 20:44

## 2025-03-26 RX ADMIN — Medication 2000 MILLILITER(S): at 16:54

## 2025-03-26 RX ADMIN — Medication 2000 MILLILITER(S): at 17:54

## 2025-03-26 RX ADMIN — Medication 650 MILLIGRAM(S): at 16:54

## 2025-03-26 RX ADMIN — Medication 100 MILLIEQUIVALENT(S): at 21:52

## 2025-03-26 RX ADMIN — KETOROLAC TROMETHAMINE 15 MILLIGRAM(S): 30 INJECTION, SOLUTION INTRAMUSCULAR; INTRAVENOUS at 16:53

## 2025-03-26 RX ADMIN — Medication 40 MILLIEQUIVALENT(S): at 18:23

## 2025-03-26 RX ADMIN — CEFTRIAXONE 100 MILLIGRAM(S): 500 INJECTION, POWDER, FOR SOLUTION INTRAMUSCULAR; INTRAVENOUS at 18:23

## 2025-03-26 RX ADMIN — Medication 100 MILLIEQUIVALENT(S): at 19:40

## 2025-03-26 NOTE — ED PROVIDER NOTE - NSICDXPASTSURGICALHX_GEN_ALL_CORE_FT
PAST SURGICAL HISTORY:  History of cystoscopy bilateral ureteral stent placement    History of left cataract extraction 2010    History of lumpectomy of right breast 1/2005    History of right cataract extraction 2017    History of total knee replacement, right

## 2025-03-26 NOTE — CONSULT NOTE ADULT - SUBJECTIVE AND OBJECTIVE BOX
GENERAL SURG CONSULT  84 y/o female who lives with son however is independent in upstairs apt with past medical history of HTN, prior DVT/PE (on AC), COPD, breast CA s/p lumpectomy with radiation (15 years ago), cataracts and past surg hx of L TKR presents to the ED with severe LLE pain since yesterday. Pt states she was unable to move/walk all day yesterday and last night. Pain has not improved, it is mostly around her knee. Pt was code sepsis upon ED presentation for fever and tachycardia. Pt was unaware of fever, chills at home. Denies changes in breath. She is usually SOB with ambulation due to COPD hx and uses O2 at home. Pt denies recent sick contacts, however, was found to be COVID+ in ED. Gen surg consulted for findings of cholelithiasis on     PAST MEDICAL & SURGICAL HISTORY:  HTN (hypertension)      Kidney stones      Calculus, ureter      Calculus in bladder      DVT (deep venous thrombosis)  right and left lower extremity      Breast cancer, right  radiation therapy and chemotherapy      PE (pulmonary thromboembolism)      History of lumpectomy of right breast  1/2005      History of left cataract extraction  2010      History of right cataract extraction  2017      History of cystoscopy  bilateral ureteral stent placement      History of total knee replacement, right          MEDICATIONS  (STANDING):  potassium chloride  10 mEq/100 mL IVPB 10 milliEquivalent(s) IV Intermittent every 1 hour    MEDICATIONS  (PRN):      Allergies    penicillin (Rash)    Intolerances        Vital Signs Last 24 Hrs  T(C): 37.4 (26 Mar 2025 17:54), Max: 38.4 (26 Mar 2025 16:33)  T(F): 99.3 (26 Mar 2025 17:54), Max: 101.2 (26 Mar 2025 16:33)  HR: 93 (26 Mar 2025 17:54) (93 - 109)  BP: 116/63 (26 Mar 2025 17:54) (116/63 - 150/78)  BP(mean): 80 (26 Mar 2025 17:54) (80 - 80)  RR: 18 (26 Mar 2025 17:54) (18 - 18)  SpO2: 95% (26 Mar 2025 17:54) (95% - 97%)    Parameters below as of 26 Mar 2025 17:54  Patient On (Oxygen Delivery Method): room air        Physical:  Gen: A&Ox3. NAD  Abd: Soft ND, NT        I&O's Detail      LABS:                        11.4   8.20  )-----------( 181      ( 26 Mar 2025 16:35 )             35.0              03-26    140  |  105  |  23[H]  ----------------------------<  148[H]  2.8[LL]   |  27  |  1.17    Ca    7.7[L]      26 Mar 2025 16:35    TPro  7.0  /  Alb  2.9[L]  /  TBili  0.6  /  DBili  x   /  AST  20  /  ALT  19  /  AlkPhos  69  03-26            PT/INR - ( 26 Mar 2025 16:35 )   PT: 18.3 sec;   INR: 1.57 ratio         PTT - ( 26 Mar 2025 16:35 )  PTT:33.3 sec  Urinalysis Basic - ( 26 Mar 2025 16:35 )    Color: x / Appearance: x / SG: x / pH: x  Gluc: 148 mg/dL / Ketone: x  / Bili: x / Urobili: x   Blood: x / Protein: x / Nitrite: x   Leuk Esterase: x / RBC: x / WBC x   Sq Epi: x / Non Sq Epi: x / Bacteria: x        RADIOLOGY & ADDITIONAL STUDIES:  < from: US Abdomen Upper Quadrant Right (03.26.25 @ 17:34) >  ACC: [id number] EXAM:  US ABDOMEN RT UPR QUADRANT   ORDERED BY: REUBEN BAIG     PROCEDURE DATE:  03/26/2025          INTERPRETATION:  CLINICAL INFORMATION: Right upper quadrant tender    COMPARISON: None available.    TECHNIQUE: Sonography of the right upper quadrant.    FINDINGS:  Liver: Within normal limits.  Bile ducts: Normal caliber. Common bile duct measures 4 mm.  Gallbladder: Multiple gallstones. Borderline to mild gallbladder wall   thickening. No pericholecystic fluid. Correlate with symptomatology. If   there is concern for cholecystitis a HIDA scan may be of diagnostic value.  Pancreas: Visualized portions are within normal limits.  Right kidney: 12.3 cm. No hydronephrosis.  Ascites: None.  IVC: Visualized portions are within normal limits.    IMPRESSION:  Cholelithiasis with borderline to mild gallbladder wall thickening.   Correlate with symptomatology. If there is a persistent concern for   cholecystitis. HIDA scan can be obtained.  No biliary dilatation.        --- End of Report ---            JESSE JARQUIN MD; Attending Radiologist  This document has been electronically signed. Mar 26 2025  5:53PM    < end of copied text >   GENERAL SURG CONSULT  86 y/o female who lives with son however is independent in upstairs apt with past medical history of HTN, prior DVT/PE (on AC), COPD, breast CA s/p lumpectomy with radiation (15 years ago), cataracts and past surg hx of L TKR presents to the ED with severe LLE pain since yesterday. Pt states she was unable to move/walk all day yesterday and last night. Pain has not improved, it is mostly around her knee. Pt was code sepsis upon ED presentation for fever and tachycardia. Pt was unaware of fever, chills at home. Denies changes in breath. She is usually SOB with ambulation due to COPD hx and uses O2 at home. Pt denies recent sick contacts, however, was found to be COVID+ in ED. Gen surg consulted for findings of cholelithiasis w/ wall thickening on US. Denies abdominal pain today / within the last week following meals. Denies nausea, vomiting. Pt admits to vomiting episode last week. No fever, chills at home. Pt has felt well with good appetite. Pt admits to known gallstones in the past. Denies prior ED visits/hospitalizations for gallstones. Pt is currently on AC for DVT.     PAST MEDICAL & SURGICAL HISTORY:  HTN (hypertension)      Kidney stones      Calculus, ureter      Calculus in bladder      DVT (deep venous thrombosis)  right and left lower extremity      Breast cancer, right  radiation therapy and chemotherapy      PE (pulmonary thromboembolism)      History of lumpectomy of right breast  1/2005      History of left cataract extraction  2010      History of right cataract extraction  2017      History of cystoscopy  bilateral ureteral stent placement      History of total knee replacement, right          MEDICATIONS  (STANDING):  potassium chloride  10 mEq/100 mL IVPB 10 milliEquivalent(s) IV Intermittent every 1 hour    MEDICATIONS  (PRN):      Allergies    penicillin (Rash)    Intolerances        Vital Signs Last 24 Hrs  T(C): 37.4 (26 Mar 2025 17:54), Max: 38.4 (26 Mar 2025 16:33)  T(F): 99.3 (26 Mar 2025 17:54), Max: 101.2 (26 Mar 2025 16:33)  HR: 93 (26 Mar 2025 17:54) (93 - 109)  BP: 116/63 (26 Mar 2025 17:54) (116/63 - 150/78)  BP(mean): 80 (26 Mar 2025 17:54) (80 - 80)  RR: 18 (26 Mar 2025 17:54) (18 - 18)  SpO2: 95% (26 Mar 2025 17:54) (95% - 97%)    Parameters below as of 26 Mar 2025 17:54  Patient On (Oxygen Delivery Method): room air        Physical:  Gen: A&Ox3. NAD  Resp: Unlabored breathing. Equal chest rise bilaterally. On NC. Pt is short of breath, gasps with full sentences. No use of accessory muscles. No signs of resp distress- pt states this is her baseline.     Abd: obese, Soft ND, NT to palpation diffusely. Negative Shultz's sign. No voluntary guarding, no rebound tenderness, no palpable masses. No overlying skin changes.   Skin: warm and dry. Nonjaundiced.         I&O's Detail      LABS:                        11.4   8.20  )-----------( 181      ( 26 Mar 2025 16:35 )             35.0              03-26    140  |  105  |  23[H]  ----------------------------<  148[H]  2.8[LL]   |  27  |  1.17    Ca    7.7[L]      26 Mar 2025 16:35    TPro  7.0  /  Alb  2.9[L]  /  TBili  0.6  /  DBili  x   /  AST  20  /  ALT  19  /  AlkPhos  69  03-26            PT/INR - ( 26 Mar 2025 16:35 )   PT: 18.3 sec;   INR: 1.57 ratio         PTT - ( 26 Mar 2025 16:35 )  PTT:33.3 sec  Urinalysis Basic - ( 26 Mar 2025 16:35 )    Color: x / Appearance: x / SG: x / pH: x  Gluc: 148 mg/dL / Ketone: x  / Bili: x / Urobili: x   Blood: x / Protein: x / Nitrite: x   Leuk Esterase: x / RBC: x / WBC x   Sq Epi: x / Non Sq Epi: x / Bacteria: x        RADIOLOGY & ADDITIONAL STUDIES:  < from: US Abdomen Upper Quadrant Right (03.26.25 @ 17:34) >  ACC: [id number] EXAM:  US ABDOMEN RT UPR QUADRANT   ORDERED BY: REUBEN D   BAIG     PROCEDURE DATE:  03/26/2025          INTERPRETATION:  CLINICAL INFORMATION: Right upper quadrant tender    COMPARISON: None available.    TECHNIQUE: Sonography of the right upper quadrant.    FINDINGS:  Liver: Within normal limits.  Bile ducts: Normal caliber. Common bile duct measures 4 mm.  Gallbladder: Multiple gallstones. Borderline to mild gallbladder wall   thickening. No pericholecystic fluid. Correlate with symptomatology. If   there is concern for cholecystitis a HIDA scan may be of diagnostic value.  Pancreas: Visualized portions are within normal limits.  Right kidney: 12.3 cm. No hydronephrosis.  Ascites: None.  IVC: Visualized portions are within normal limits.    IMPRESSION:  Cholelithiasis with borderline to mild gallbladder wall thickening.   Correlate with symptomatology. If there is a persistent concern for   cholecystitis. HIDA scan can be obtained.  No biliary dilatation.        --- End of Report ---            JESSE JARQUIN MD; Attending Radiologist  This document has been electronically signed. Mar 26 2025  5:53PM    < end of copied text >

## 2025-03-26 NOTE — H&P ADULT - PROBLEM SELECTOR PLAN 7
hx of COPD   -denying SOB however tachypneic with increased WOB in ED  -saturating well on room air, no wheezing on exam, low suspicion for exacerbation  -f/u CXR read  -c/w home nebs and albuterol HFA PRN  -abx for possible PNA as above

## 2025-03-26 NOTE — ED PROVIDER NOTE - NSICDXPASTMEDICALHX_GEN_ALL_CORE_FT
PAST MEDICAL HISTORY:  Breast cancer, right radiation therapy and chemotherapy    Calculus in bladder     Calculus, ureter     DVT (deep venous thrombosis) right and left lower extremity    HTN (hypertension)     Kidney stones     PE (pulmonary thromboembolism)

## 2025-03-26 NOTE — ED ADULT TRIAGE NOTE - CHIEF COMPLAINT QUOTE
donovan with c/o left leg weakness and knee pain since two days/as per ems pt needs help at home /need to talk  / donovan with c/o left leg weakness and knee pain since two days/as per ems pt needs help at home/needs to talk with

## 2025-03-26 NOTE — ED ADULT NURSE NOTE - NS ED NOTE ABUSE SUSPICION NEGLECT YN
"Lakhwinder Negron's goals for this visit include: Return  She requests these members of her care team be copied on today's visit information: PCP    PCP: Jonna Cason    Referring Provider:  Ramon Rodriguez MD  [address]  [place] [address]    BP (!) 181/53 (BP Location: Right arm, Patient Position: Sitting, Cuff Size: Adult Small)   Pulse 63   Resp 16   Ht 1.422 m (4' 8\")   Wt 51.1 kg (112 lb 11.2 oz)   LMP  (LMP Unknown)   SpO2 98%   BMI 25.27 kg/m      Do you need any medication refills at today's visit? N    "
No

## 2025-03-26 NOTE — H&P ADULT - ASSESSMENT
85y F with PMH of COPD, HTN, nephroureteral lithiasis, gout, DVT on eliquis, osteoporosis, breast CA s/p lumpectomy, chronic b/l LE edema, and s/p right TKR, presenting with acute left knee pain and inability to bear weight since for 4 days. Incidentally found to be septic and covid positive in the ED with multiple electrolyte deficiencies. ED course also complicated by episode of unstable SVT requiring adenosine push. Admitted to telemetry for undifferentiated sepsis possibly 2/2 UTI vs. PNA and unstable SVT in s/o sepsis and multiple electrolyte abnormalities.

## 2025-03-26 NOTE — ED PROVIDER NOTE - OBJECTIVE STATEMENT
85 female with hx of HTN, prior kidney stones, prior DVT/PE on AC.   Patient presenting to the ED reporting generalized weakness & left knee pain with LLE swelling x2d.  Patient did not know that she was febrile.

## 2025-03-26 NOTE — H&P ADULT - PROBLEM SELECTOR PLAN 4
p/w phos 1.5  -s/p Kphos 30mM IV in ED  -f/u repeat phos  -monitor for refeeding syndrome in s/o recent poor po intake

## 2025-03-26 NOTE — H&P ADULT - PROBLEM SELECTOR PLAN 1
p/w fever, HR >90, RR>20  -WBC and lactate WNL  -UA: WBC 0, RBC >50, positive nitrite, large leuk esterase, moderate bacteria  -CXR: L pleural effusion concerning for PNA (wet read  -COVID positive-->not requiring O2-->holding off on remdesivir and steroids for now  -s/p 2L as boluses in ED  -c/w maintenance IVF  -c/w IV abx  -f/u BCx,, UCx  -ID consult in AM p/w fever 101.2F, HR >90, RR>20  -WBC and lactate WNL  -UA: WBC 0, RBC >50, positive nitrite, large leuk esterase, moderate bacteria  -CXR: L pleural effusion concerning for PNA (wet read  -COVID positive-->not requiring O2-->holding off on remdesivir and steroids for now  -s/p 2L as boluses in ED  -c/w maintenance IVF  -start ceftriaxone/azithromycin for CAP/UTI  -f/u BCx,, UCx  -ID consult in AM

## 2025-03-26 NOTE — H&P ADULT - HISTORY OF PRESENT ILLNESS
85y F with PMH of COPD, HTN, nephroureteral lithiasis, gout, DVT on eliquis, osteoporosis, breast CA s/p lumpectomy, chronic b/l LE edema, and s/p right TKR, presenting with acute left knee pain and inability to bear weight since for 4 days. Patient reports abrupt onset of left knee pain without preceding injury or fall, rendering them bedridden for the past two days. Unable to stand or put weight on affected leg. Patient notes longstanding issues with left knee, which has not been surgically addressed unlike the right knee. X-ray of left knee showed degenerative changes with joint space narrowing, no fractures observed. Incidental finding of COVID-19 with fever, though patient denies feeling feverish. s/p code sepsis in ED. Denies shortness of breath, chest pain, or palpitations. Noted to be breathing rapidly without subjective awareness. Denies orthopnea stating that she normally sleeps upright in recliner chair. Reports single episode of vomiting last Friday, attributed to something she ate, with no recurrence since. Patient reports not eating for two days due to distress, but managed small meal today. Acknowledges poor fluid intake, resulting in decreased urination. States that her PCP tells her that she has UTI and reports completing course of antibiotics recently (nitrofuantoin per surescripts). Denies fever, chills, lightheadedness, chest pain, palpitations, abdominal pain, diarrhea, constipation, recent weight loss, or night sweats.    In the ED:  T(F): , Max: 101.2 (16:33); HR:  (82 - 109); BP:  (113/65 - 150/78); RR:  (16 - 19); SpO2:  (95% - 98%)  WBC:8.20, Hb.4, PLT:181  Na:140, K:2.8, Cl:105, HCO3:27, BUN:23, sCr:1.17, Lactate: 1.3  Troponin:8.2, Magnesium 1.3, phosphorus 1.5\  COVID+  UA: WBC 0, RBC >50, positive nitrite, large leuk esterase, moderate bacteria  Episode of unstable SVT sustaining at 160bpm with SBP as low as 68 (patient denied symptoms)  s/p successful conversion to NSR with adenosine 6mg IV x1    s/p magnesium sulfate  IVPB 2 Gram(s); potassium chloride   Powder 40 milliEquivalent(s); potassium chloride  10 mEq/100 mL IVPB 10 milliEquivalent(s); potassium phosphate IVPB 30 milliMole(s) 85y F with PMH of COPD, HTN, nephroureteral lithiasis, gout, DVT on eliquis, osteoporosis, breast CA s/p lumpectomy, chronic b/l LE edema, and s/p right TKR, presenting with acute left knee pain and inability to bear weight since for 4 days. Patient reports abrupt onset of left knee pain without preceding injury or fall, rendering them bedridden for the past two days. Unable to stand or put weight on affected leg. Patient notes longstanding issues with left knee, which has not been surgically addressed unlike the right knee. X-ray of left knee showed degenerative changes with joint space narrowing, no fractures observed. Incidental finding of COVID-19 with fever, though patient denies feeling feverish. s/p code sepsis in ED. Denies shortness of breath, chest pain, or palpitations. Noted to be breathing rapidly without subjective awareness. Denies orthopnea stating that she normally sleeps upright in recliner chair. Reports single episode of vomiting last Friday, attributed to something she ate, with no recurrence since. Patient reports not eating for two days due to distress, but managed small meal today. Acknowledges poor fluid intake, resulting in decreased urination. States that her PCP tells her that she has UTI and reports completing course of antibiotics recently (nitrofuantoin per surescripts). Denies fever, chills, lightheadedness, chest pain, palpitations, abdominal pain, diarrhea, constipation, recent weight loss, or night sweats.    In the ED:  T(F): , Max: 101.2 (16:33); HR:  (82 - 109); BP:  (113/65 - 150/78); RR:  (16 - 19); SpO2:  (95% - 98%)  WBC:8.20, Hb.4, PLT:181  Na:140, K:2.8, Cl:105, HCO3:27, BUN:23, sCr:1.17, Lactate: 1.3  Troponin:8.2, Magnesium 1.3, phosphorus 1.5  COVID+  UA: WBC 0, RBC >50, positive nitrite, large leuk esterase, moderate bacteria  Episode of unstable SVT sustaining at 160bpm with SBP as low as 68 (patient denied symptoms)  s/p successful conversion to NSR with adenosine 6mg IV x1    s/p magnesium sulfate  IVPB 2 Gram(s); potassium chloride   Powder 40 milliEquivalent(s); potassium chloride  10 mEq/100 mL IVPB 10 milliEquivalent(s); potassium phosphate IVPB 30 milliMole(s)

## 2025-03-26 NOTE — CONSULT NOTE ADULT - ASSESSMENT
85F p/w LLE pain (hx of DVT on AC) found to be septic, COVID+ in ED  US revealed cholelithiasis with wall thickening   no leukocytosis, LFTs ok    Plan   - medical admission   - LLE pain workup - f/u duplex study  - supportive care for acute COVID infxn  - monitor resp status  - replete lytes  - trend CMP  - IV Rocephin, Flagyl   - may obtain HIDA  - surg to follow   - d/w Dr. Gudino   - d/w Dr. Mayen

## 2025-03-26 NOTE — ED PROVIDER NOTE - NS ED ROS FT
Constitutional: (-) fever (-) chills (+) generalized weakness  HENT: (-) congestion (-) rhinorrhea (-) sore throat  Eyes: (-) pain (-) redness  Respiratory: (-) cough (-) shortness of breath (-) wheezing (-) stridor  Cardiovascular: (-) chest pain (-) palpitations (-) leg swelling  Gastrointestinal: (-) abdominal pain (-) blood in stool (no melena/hematochezia) (-) diarrhea (-) vomiting  Genitourinary: (-) dysuria (-) hematuria  Musculoskeletal: (+) gait problem (+) L knee pain/swelling (-) myalgias  Skin: (-) color change (-) rash  Neurological: (-) focal weakness (-) numbness (-) headaches  Psychiatric/Behavioral: (-) confusion

## 2025-03-26 NOTE — H&P ADULT - PROBLEM SELECTOR PLAN 8
p/w chief complaint of severe L knee pain and inability to bear weight  -denies trauma or fall  -X ray shows narrowing of joint space (wet read)  -Ortho consult  -PT consult  -Pain control  -fall precautions

## 2025-03-26 NOTE — ED PROVIDER NOTE - CLINICAL SUMMARY MEDICAL DECISION MAKING FREE TEXT BOX
85 female with hx of HTN, prior kidney stones, prior DVT/PE on AC.   Patient presenting to the ED reporting generalized weakness & left knee pain with LLE swelling x2d.  Patient did not know that she was febrile.    Vitals with fever & tachycardia.  Nontoxic appearing, n/v intact.  Airway intact, no respiratory distress, no hypoxia.  Mild RUQ abdominal tenderness.  No CVA tenderness.  + L LE swelling.     Plan to obtain:  -Labs, EKG, XR's, US R/O DVT, US R/O cholecystitis, IV fluids, analgesia/antipyretics as needed, ABX, observe/reassess    ED Course:  Lab values demonstrate no acute/emergent pathology.  My independent interpretation of the EKG: Sinus @ 110, normal axis, normal intervals, normal ST/T  My independent interpretation of XR: [No consolidation/effusion/pntx.]    [***]    [Patient advised regarding need for close outpatient follow up.  Patient stable for further care in outpatient setting. No indication for inpatient admission at this time. Patient advised regarding symptomatic & supportive care and symptoms to prompt ED return. Strict return precautions provided.]    [Patient requires inpatient admission for further care & stabilization. Care signed out to inpatient team.] 85 female with hx of HTN, prior kidney stones, prior DVT/PE on AC.   Patient presenting to the ED reporting generalized weakness & left knee pain with LLE swelling x2d.  Patient did not know that she was febrile.    Vitals with fever & tachycardia.  Nontoxic appearing, n/v intact.  Airway intact, no respiratory distress, no hypoxia.  Mild RUQ abdominal tenderness.  No CVA tenderness.  + L LE swelling.     Plan to obtain:  -Labs, EKG, XR's, US R/O DVT, US R/O cholecystitis, IV fluids, analgesia/antipyretics as needed, ABX, observe/reassess    ED Course:  IV fluids, antipyretics, & abx given.  Lab values w WBC & lactate ok. +COVID. HypoK. Renal function & LFT ok.  My independent interpretation of the EKG: Sinus @ 110, normal axis, normal intervals, normal ST/T  K being repleted.  XR ok.  US showing no DVT  US showing concern for possible cholecystitis.  Care discussed w [facility] of Surgery service. No indication for operative intervention at this time. Recommending admit to medicine and obtain HIDA.  Patient requires inpatient admission for further care & stabilization. Care signed out to inpatient team.

## 2025-03-26 NOTE — H&P ADULT - NSHPPHYSICALEXAM_GEN_ALL_CORE
LOS: 1d    VITALS:   T(C): 37.1 (03-27-25 @ 05:22), Max: 38.4 (03-26-25 @ 16:33)  HR: 92 (03-27-25 @ 05:22) (82 - 109)  BP: 130/77 (03-27-25 @ 05:22) (113/65 - 150/78)  RR: 19 (03-27-25 @ 05:22) (16 - 19)  SpO2: 98% (03-27-25 @ 05:22) (95% - 98%)    GENERAL: NAD, lying in bed comfortably  HEAD:  Atraumatic, Normocephalic  EYES: EOMI, PERRLA, conjunctiva and sclera clear  ENT: Moist mucous membranes  NECK: Supple, No JVD  CHEST/LUNG: Clear to auscultation on the R with diminished L basilar breath sounds; No rales, rhonchi, wheezing, or rubs. tachypneic, mild increased work of breathing  HEART: Tachycardic, regular rhythm; No murmurs, rubs, or gallops  ABDOMEN: BSx4; Soft, nontender, distended, tympanic to percussion, palpable bladder fullness  EXTREMITIES:  2+ radial pulses, brisk capillary refill. No clubbing, cyanosis, marked nonpitting edema of b/l LEs L>R, pain with passive flexion of the L knee, no point tenderness over left knee joint  NERVOUS SYSTEM:  A&Ox3, no focal deficits   SKIN: hyperkeratotic changes to b/l LE, No rashes or lesions

## 2025-03-26 NOTE — ED ADULT NURSE NOTE - CHIEF COMPLAINT QUOTE
donovan with c/o left leg weakness and knee pain since two days/as per ems pt needs help at home/needs to talk with

## 2025-03-26 NOTE — H&P ADULT - PROBLEM SELECTOR PLAN 2
episode of unstable SVT in ED  -s/p adenosine 6mg IV push  -monitor on telemetry  -c/w home metoprolol   -possibly in s/o electrolyte derrangements  -replete electrolytes to maintain K>4 and Mg>2  -Cardiology consult in AM episode of unstable SVT in ED  -s/p adenosine 6mg IV push  -monitor on telemetry  -c/w home metoprolol   -possibly in s/o electrolyte derrangements  -replete electrolytes to maintain K>4 and Mg>2  -Cardiology consult in AM  -f/u TTE

## 2025-03-26 NOTE — ED ADULT NURSE NOTE - OBJECTIVE STATEMENT
Patient c/o left knee pain and  generalized weakness with LLE swelling x2 days. Patient febrile at triage, code sepsis activated. Denies chest pain/SOB, cough.

## 2025-03-26 NOTE — ED PROVIDER NOTE - NSICDXFAMILYHX_GEN_ALL_CORE_FT
FAMILY HISTORY:  Cerebrovascular accident  Family history of Parkinson's disease    Child  Still living? Yes, Estimated age: 41-50  Family history of kidney stones, Age at diagnosis: Age Unknown

## 2025-03-26 NOTE — H&P ADULT - PROBLEM SELECTOR PLAN 6
per ED provider endorsing RUQ pain however denies on writers assessment  -RUQ US: Cholelithiasis with borderline to mild gallbladder wall thickening.  -surgery consulted in ED  -f/u HIDA scan per ED provider endorsing RUQ pain however denies on writers assessment  -RUQ US: Cholelithiasis with borderline to mild gallbladder wall thickening.  -surgery consulted in ED  -f/u HIDA scan if clinical concern for acute cholecystitis

## 2025-03-26 NOTE — H&P ADULT - NSICDXPASTMEDICALHX_GEN_ALL_CORE_FT
PAST MEDICAL HISTORY:  Breast cancer, right radiation therapy and chemotherapy    Calculus in bladder     Calculus, ureter     Chronic edema     DVT (deep venous thrombosis) right and left lower extremity    Frequent UTI     HTN (hypertension)     Kidney stones     Osteoporosis     PE (pulmonary thromboembolism)

## 2025-03-26 NOTE — H&P ADULT - PROBLEM SELECTOR PLAN 5
p/w magnesium 1.3  -s/p 2g IV magnesium in ED  -f/u repeat mg  -replace electrolytes to maintain Mg>2

## 2025-03-26 NOTE — ED PROVIDER NOTE - PHYSICAL EXAMINATION
Gen:  Awake, alert, NAD, WDWN, NCAT, non-toxic appearing.  Eyes:  PERRL, EOMI, no icterus, normal lids/lashes, normal conjunctivae.  ENT:  External inspection normal, pink/dry membranes.   CV:  S1S2, regular rate and rhythm, 2/6 systolic murmur, no gallops/rubs, no JVD, 2+ pulses b/l, L>R LE edema, no cords/homans, good capillary refill, warm/well-perfused.  Resp:  Normal respiratory rate/effort, no respiratory distress, normal voice, speaking full sentences, lungs clear to auscultation b/l, no wheezing/rales/rhonchi, no retractions, no stridor.  Abd:  Soft abdomen, obese, mild RUQ tender, no distended/guarding/rebound, no pulsatile mass, no CVA tender.   Musculoskeletal:  N/V intact, FROM all 4 extremities, normal motor tone. B/L LE: FROM, stable joints, no effusion/swelling, soft comparments, no lilly tender/deformity.   Neck:  FROM neck, supple, trachea midline, no meningismus.   Skin:  Color normal for race, warm and dry, no rash. LLE chronic venostasis changes w mild more pronounced erythema than on RLE   Neuro:  Oriented x3, CN 2-12 intact (grossly), normal motor (grossly), normal sensory (grossly), GCS 15  Psych:  Attention normal. Affect normal. Behavior normal. Judgment normal.

## 2025-03-26 NOTE — H&P ADULT - ATTENDING COMMENTS
Vital Signs Last 24 Hrs  T(C): 37.2 (26 Mar 2025 19:54), Max: 38.4 (26 Mar 2025 16:33)  T(F): 99 (26 Mar 2025 19:54), Max: 101.2 (26 Mar 2025 16:33)  HR: 90 (26 Mar 2025 19:54) (90 - 109)  BP: 123/77 (26 Mar 2025 19:54) (116/63 - 150/78)  BP(mean): 80 (26 Mar 2025 17:54) (80 - 80)  RR: 16 (26 Mar 2025 19:54) (16 - 18)  SpO2: 96% (26 Mar 2025 19:54) (95% - 97%)  Parameters below as of 26 Mar 2025 19:54  Patient On (Oxygen Delivery Method): room air    WBC with left shift and monocytosis  INR 1.57 PT 18.3    K 2.8  BUN 23 / Cr 1.17 gfr 45  Alb 2.9    Ca -corrected 7.8  Mg 1.3  Phos 1.5    cov 19 - detected    RUQ US  Liver: Within normal limits.  Bile ducts: Normal caliber. Common bile duct measures 4 mm.  Gallbladder: Multiple gallstones. Borderline to mild gallbladder wall   thickening. No pericholecystic fluid. Correlate with symptomatology. If   there is concern for cholecystitis a HIDA scan may be of diagnostic value.  Pancreas: Visualized portions are within normal limits.  Right kidney: 12.3 cm. No hydronephrosis.  Ascites: None.  IVC: Visualized portions are within normal limits.    AP single view CXR -limited with no lateral view  ? cardiomegaly ; unable to evaluate LLL ? infiltrate      Impression   85 year old woman with medical hx including HTN, COPD, remote hx of VTE here with complaints of weakness and left leg pain but on evaluation noted to be septic with RUQ tenderness.  COVID 19 +ve with no respiratory findings on clinical evaluation.  RUQ imaging show cholelithiasis with no clear cut cholecystitis.  CXR though limited show no obvious findings      Problems  Sepsis  Acute COVID 19 infection  Cholelithiasis with suspected cholecystitis  copd   htn    Plan  Admit to Medicine with airborne precaution   Sepsis work up   S/p antibiotics in the ED; will hold off further antibiotics   F/up HIDA scan   Pain control   SW consult   Med reconciliation; resume home meds  Fall precaution Vital Signs Last 24 Hrs  T(C): 37.2 (26 Mar 2025 19:54), Max: 38.4 (26 Mar 2025 16:33)  T(F): 99 (26 Mar 2025 19:54), Max: 101.2 (26 Mar 2025 16:33)  HR: 90 (26 Mar 2025 19:54) (90 - 109)  BP: 123/77 (26 Mar 2025 19:54) (116/63 - 150/78)  BP(mean): 80 (26 Mar 2025 17:54) (80 - 80)  RR: 16 (26 Mar 2025 19:54) (16 - 18)  SpO2: 96% (26 Mar 2025 19:54) (95% - 97%)  Parameters below as of 26 Mar 2025 19:54  Patient On (Oxygen Delivery Method): room air    WBC with left shift and monocytosis  INR 1.57 PT 18.3    K 2.8  BUN 23 / Cr 1.17 gfr 45  Alb 2.9    Ca -corrected 7.8  Mg 1.3  Phos 1.5    cov 19 - detected    RUQ US  Liver: Within normal limits.  Bile ducts: Normal caliber. Common bile duct measures 4 mm.  Gallbladder: Multiple gallstones. Borderline to mild gallbladder wall   thickening. No pericholecystic fluid. Correlate with symptomatology. If   there is concern for cholecystitis a HIDA scan may be of diagnostic value.  Pancreas: Visualized portions are within normal limits.  Right kidney: 12.3 cm. No hydronephrosis.  Ascites: None.  IVC: Visualized portions are within normal limits.    AP single view CXR -limited with no lateral view  ? cardiomegaly ; unable to evaluate LLL ? infiltrate.  UA - microscopic hematuria      Impression   85 year old woman with medical hx including HTN, COPD, remote hx of VTE here with complaints of weakness and left leg pain. She also has been having intermittent wet cough with clear phlegm and fevers only noted in the ED.  She meets sepsis criteria  RUQ tenderness per ED. No abdominal tenderness on my exam  COVID 19 +ve with no respiratory findings on clinical evaluation.  RUQ imaging show cholelithiasis with no clear cut cholecystitis.  CXR though limited show no obvious findings      Problems  Sepsis  Suspected LLL pneumonia  Acute COVID 19 infection  Acute SVT   Hypokalemia  Hypomagnesemia   Hypocalcemia  Cholelithiasis with suspected cholecystitis  copd   HTN    Plan  Admit to telemetry with airborne precaution   s/p failed vagal maneuver but successful termination with adenosine 6mg. back in sinus rhythm  Correct hypokalemia and hypomagnesemia to above 4 and 2 respectively.  Obtain ECHO  Cardiology consult.  Sepsis work up   S/p antibiotics in the ED;   Will continue ceftriaxone and azithromycin for CAP  F/up HIDA scan   Pain control   SW consult   Med reconciliation; resume home meds  Fall precaution

## 2025-03-27 DIAGNOSIS — J44.9 CHRONIC OBSTRUCTIVE PULMONARY DISEASE, UNSPECIFIED: ICD-10-CM

## 2025-03-27 DIAGNOSIS — R39.89 OTHER SYMPTOMS AND SIGNS INVOLVING THE GENITOURINARY SYSTEM: ICD-10-CM

## 2025-03-27 DIAGNOSIS — M25.562 PAIN IN LEFT KNEE: ICD-10-CM

## 2025-03-27 DIAGNOSIS — K80.20 CALCULUS OF GALLBLADDER WITHOUT CHOLECYSTITIS WITHOUT OBSTRUCTION: ICD-10-CM

## 2025-03-27 DIAGNOSIS — E83.39 OTHER DISORDERS OF PHOSPHORUS METABOLISM: ICD-10-CM

## 2025-03-27 DIAGNOSIS — E83.42 HYPOMAGNESEMIA: ICD-10-CM

## 2025-03-27 DIAGNOSIS — A41.9 SEPSIS, UNSPECIFIED ORGANISM: ICD-10-CM

## 2025-03-27 DIAGNOSIS — U07.1 COVID-19: ICD-10-CM

## 2025-03-27 DIAGNOSIS — N39.0 URINARY TRACT INFECTION, SITE NOT SPECIFIED: ICD-10-CM

## 2025-03-27 DIAGNOSIS — I47.10 SUPRAVENTRICULAR TACHYCARDIA, UNSPECIFIED: ICD-10-CM

## 2025-03-27 DIAGNOSIS — E87.6 HYPOKALEMIA: ICD-10-CM

## 2025-03-27 DIAGNOSIS — Z29.9 ENCOUNTER FOR PROPHYLACTIC MEASURES, UNSPECIFIED: ICD-10-CM

## 2025-03-27 LAB
A1C WITH ESTIMATED AVERAGE GLUCOSE RESULT: 6.1 % — HIGH (ref 4–5.6)
ALBUMIN SERPL ELPH-MCNC: 2.3 G/DL — LOW (ref 3.5–5)
ALP SERPL-CCNC: 57 U/L — SIGNIFICANT CHANGE UP (ref 40–120)
ALT FLD-CCNC: 15 U/L DA — SIGNIFICANT CHANGE UP (ref 10–60)
ANION GAP SERPL CALC-SCNC: 5 MMOL/L — SIGNIFICANT CHANGE UP (ref 5–17)
APPEARANCE UR: CLEAR — SIGNIFICANT CHANGE UP
AST SERPL-CCNC: 17 U/L — SIGNIFICANT CHANGE UP (ref 10–40)
BACTERIA # UR AUTO: ABNORMAL /HPF
BASOPHILS # BLD AUTO: 0.07 K/UL — SIGNIFICANT CHANGE UP (ref 0–0.2)
BASOPHILS NFR BLD AUTO: 0.7 % — SIGNIFICANT CHANGE UP (ref 0–2)
BILIRUB SERPL-MCNC: 0.3 MG/DL — SIGNIFICANT CHANGE UP (ref 0.2–1.2)
BILIRUB UR-MCNC: NEGATIVE — SIGNIFICANT CHANGE UP
BUN SERPL-MCNC: 21 MG/DL — HIGH (ref 7–18)
CALCIUM SERPL-MCNC: 6.8 MG/DL — LOW (ref 8.4–10.5)
CHLORIDE SERPL-SCNC: 114 MMOL/L — HIGH (ref 96–108)
CHOLEST SERPL-MCNC: 92 MG/DL — SIGNIFICANT CHANGE UP
CO2 SERPL-SCNC: 23 MMOL/L — SIGNIFICANT CHANGE UP (ref 22–31)
COLOR SPEC: YELLOW — SIGNIFICANT CHANGE UP
CREAT SERPL-MCNC: 1.12 MG/DL — SIGNIFICANT CHANGE UP (ref 0.5–1.3)
CRP SERPL-MCNC: 122 MG/L — HIGH (ref 0–5)
DIFF PNL FLD: ABNORMAL
EGFR: 48 ML/MIN/1.73M2 — LOW
EGFR: 48 ML/MIN/1.73M2 — LOW
EOSINOPHIL # BLD AUTO: 0.17 K/UL — SIGNIFICANT CHANGE UP (ref 0–0.5)
EOSINOPHIL NFR BLD AUTO: 1.8 % — SIGNIFICANT CHANGE UP (ref 0–6)
ERYTHROCYTE [SEDIMENTATION RATE] IN BLOOD: 44 MM/HR — HIGH (ref 0–20)
ESTIMATED AVERAGE GLUCOSE: 128 MG/DL — HIGH (ref 68–114)
GLUCOSE SERPL-MCNC: 133 MG/DL — HIGH (ref 70–99)
GLUCOSE UR QL: NEGATIVE MG/DL — SIGNIFICANT CHANGE UP
HCT VFR BLD CALC: 31.9 % — LOW (ref 34.5–45)
HDLC SERPL-MCNC: 26 MG/DL — LOW
HGB BLD-MCNC: 10.3 G/DL — LOW (ref 11.5–15.5)
IMM GRANULOCYTES NFR BLD AUTO: 5.4 % — HIGH (ref 0–0.9)
KETONES UR-MCNC: NEGATIVE MG/DL — SIGNIFICANT CHANGE UP
LDLC SERPL-MCNC: 52 MG/DL — SIGNIFICANT CHANGE UP
LEUKOCYTE ESTERASE UR-ACNC: ABNORMAL
LIPID PNL WITH DIRECT LDL SERPL: 52 MG/DL — SIGNIFICANT CHANGE UP
LYMPHOCYTES # BLD AUTO: 1.33 K/UL — SIGNIFICANT CHANGE UP (ref 1–3.3)
LYMPHOCYTES # BLD AUTO: 13.9 % — SIGNIFICANT CHANGE UP (ref 13–44)
MAGNESIUM SERPL-MCNC: 1.7 MG/DL — SIGNIFICANT CHANGE UP (ref 1.6–2.6)
MCHC RBC-ENTMCNC: 28.4 PG — SIGNIFICANT CHANGE UP (ref 27–34)
MCHC RBC-ENTMCNC: 32.3 G/DL — SIGNIFICANT CHANGE UP (ref 32–36)
MCV RBC AUTO: 87.9 FL — SIGNIFICANT CHANGE UP (ref 80–100)
MONOCYTES # BLD AUTO: 1.28 K/UL — HIGH (ref 0–0.9)
MONOCYTES NFR BLD AUTO: 13.4 % — SIGNIFICANT CHANGE UP (ref 2–14)
NEUTROPHILS # BLD AUTO: 6.19 K/UL — SIGNIFICANT CHANGE UP (ref 1.8–7.4)
NEUTROPHILS NFR BLD AUTO: 64.8 % — SIGNIFICANT CHANGE UP (ref 43–77)
NITRITE UR-MCNC: POSITIVE
NONHDLC SERPL-MCNC: 66 MG/DL — SIGNIFICANT CHANGE UP
NRBC BLD AUTO-RTO: 0 /100 WBCS — SIGNIFICANT CHANGE UP (ref 0–0)
PH UR: 5.5 — SIGNIFICANT CHANGE UP (ref 5–8)
PHOSPHATE SERPL-MCNC: 2 MG/DL — LOW (ref 2.5–4.5)
PLATELET # BLD AUTO: 155 K/UL — SIGNIFICANT CHANGE UP (ref 150–400)
POTASSIUM SERPL-MCNC: 3.5 MMOL/L — SIGNIFICANT CHANGE UP (ref 3.5–5.3)
POTASSIUM SERPL-SCNC: 3.5 MMOL/L — SIGNIFICANT CHANGE UP (ref 3.5–5.3)
PROT SERPL-MCNC: 5.9 G/DL — LOW (ref 6–8.3)
PROT UR-MCNC: ABNORMAL MG/DL
RBC # BLD: 3.63 M/UL — LOW (ref 3.8–5.2)
RBC # FLD: 14.4 % — SIGNIFICANT CHANGE UP (ref 10.3–14.5)
RBC CASTS # UR COMP ASSIST: >50 /HPF — HIGH (ref 0–4)
SODIUM SERPL-SCNC: 142 MMOL/L — SIGNIFICANT CHANGE UP (ref 135–145)
SP GR SPEC: 1.01 — SIGNIFICANT CHANGE UP (ref 1–1.03)
TRIGL SERPL-MCNC: 59 MG/DL — SIGNIFICANT CHANGE UP
UROBILINOGEN FLD QL: 0.2 MG/DL — SIGNIFICANT CHANGE UP (ref 0.2–1)
WBC # BLD: 9.56 K/UL — SIGNIFICANT CHANGE UP (ref 3.8–10.5)
WBC # FLD AUTO: 9.56 K/UL — SIGNIFICANT CHANGE UP (ref 3.8–10.5)
WBC UR QL: 0 /HPF — SIGNIFICANT CHANGE UP (ref 0–5)

## 2025-03-27 PROCEDURE — 93010 ELECTROCARDIOGRAM REPORT: CPT

## 2025-03-27 PROCEDURE — 99233 SBSQ HOSP IP/OBS HIGH 50: CPT | Mod: GC

## 2025-03-27 RX ORDER — METOPROLOL SUCCINATE 50 MG/1
25 TABLET, EXTENDED RELEASE ORAL
Refills: 0 | Status: DISCONTINUED | OUTPATIENT
Start: 2025-03-27 | End: 2025-03-27

## 2025-03-27 RX ORDER — IPRATROPIUM BROMIDE AND ALBUTEROL SULFATE .5; 2.5 MG/3ML; MG/3ML
3 SOLUTION RESPIRATORY (INHALATION) EVERY 12 HOURS
Refills: 0 | Status: DISCONTINUED | OUTPATIENT
Start: 2025-03-27 | End: 2025-03-27

## 2025-03-27 RX ORDER — ALBUTEROL SULFATE 2.5 MG/3ML
2 VIAL, NEBULIZER (ML) INHALATION EVERY 6 HOURS
Refills: 0 | Status: DISCONTINUED | OUTPATIENT
Start: 2025-03-27 | End: 2025-03-31

## 2025-03-27 RX ORDER — RISEDRONATE SODIUM 35 MG/1
1 TABLET, FILM COATED ORAL
Refills: 3 | DISCHARGE

## 2025-03-27 RX ORDER — NITROFURANTOIN MACROCRYSTAL 100 MG
0 CAPSULE ORAL
Qty: 0 | Refills: 0 | DISCHARGE

## 2025-03-27 RX ORDER — CEFTRIAXONE 500 MG/1
1000 INJECTION, POWDER, FOR SOLUTION INTRAMUSCULAR; INTRAVENOUS EVERY 24 HOURS
Refills: 0 | Status: DISCONTINUED | OUTPATIENT
Start: 2025-03-27 | End: 2025-03-31

## 2025-03-27 RX ORDER — BUDESONIDE 90 UG/1
0.5 AEROSOL, POWDER RESPIRATORY (INHALATION)
Refills: 0 | Status: DISCONTINUED | OUTPATIENT
Start: 2025-03-27 | End: 2025-03-31

## 2025-03-27 RX ORDER — ADENOSINE 3 MG/ML
12 INJECTION, SOLUTION INTRAVENOUS ONCE
Refills: 0 | Status: COMPLETED | OUTPATIENT
Start: 2025-03-27 | End: 2025-03-27

## 2025-03-27 RX ORDER — METOPROLOL SUCCINATE 50 MG/1
25 TABLET, EXTENDED RELEASE ORAL
Refills: 0 | Status: DISCONTINUED | OUTPATIENT
Start: 2025-03-27 | End: 2025-03-31

## 2025-03-27 RX ORDER — ACETAMINOPHEN 500 MG/5ML
1000 LIQUID (ML) ORAL EVERY 8 HOURS
Refills: 0 | Status: DISCONTINUED | OUTPATIENT
Start: 2025-03-27 | End: 2025-03-31

## 2025-03-27 RX ORDER — APIXABAN 2.5 MG/1
5 TABLET, FILM COATED ORAL EVERY 12 HOURS
Refills: 0 | Status: DISCONTINUED | OUTPATIENT
Start: 2025-03-27 | End: 2025-03-31

## 2025-03-27 RX ORDER — ADENOSINE 3 MG/ML
6 INJECTION, SOLUTION INTRAVENOUS ONCE
Refills: 0 | Status: COMPLETED | OUTPATIENT
Start: 2025-03-27 | End: 2025-03-27

## 2025-03-27 RX ORDER — SOD PHOS DI, MONO/K PHOS MONO 250 MG
1 TABLET ORAL ONCE
Refills: 0 | Status: COMPLETED | OUTPATIENT
Start: 2025-03-27 | End: 2025-03-27

## 2025-03-27 RX ORDER — AZITHROMYCIN 250 MG
500 CAPSULE ORAL EVERY 24 HOURS
Refills: 0 | Status: DISCONTINUED | OUTPATIENT
Start: 2025-03-27 | End: 2025-03-27

## 2025-03-27 RX ORDER — SIMETHICONE 80 MG
80 TABLET,CHEWABLE ORAL ONCE
Refills: 0 | Status: COMPLETED | OUTPATIENT
Start: 2025-03-27 | End: 2025-03-27

## 2025-03-27 RX ADMIN — CEFTRIAXONE 100 MILLIGRAM(S): 500 INJECTION, POWDER, FOR SOLUTION INTRAMUSCULAR; INTRAVENOUS at 17:35

## 2025-03-27 RX ADMIN — Medication 1 PACKET(S): at 11:26

## 2025-03-27 RX ADMIN — METOPROLOL SUCCINATE 25 MILLIGRAM(S): 50 TABLET, EXTENDED RELEASE ORAL at 05:28

## 2025-03-27 RX ADMIN — Medication 80 MILLIGRAM(S): at 22:03

## 2025-03-27 RX ADMIN — Medication 1000 MILLIGRAM(S): at 12:45

## 2025-03-27 RX ADMIN — IPRATROPIUM BROMIDE AND ALBUTEROL SULFATE 3 MILLILITER(S): .5; 2.5 SOLUTION RESPIRATORY (INHALATION) at 05:28

## 2025-03-27 RX ADMIN — APIXABAN 5 MILLIGRAM(S): 2.5 TABLET, FILM COATED ORAL at 17:12

## 2025-03-27 RX ADMIN — POTASSIUM PHOSPHATE, MONOBASIC POTASSIUM PHOSPHATE, DIBASIC INJECTION, 83.33 MILLIMOLE(S): 236; 224 SOLUTION, CONCENTRATE INTRAVENOUS at 00:23

## 2025-03-27 RX ADMIN — ADENOSINE 6 MILLIGRAM(S): 3 INJECTION, SOLUTION INTRAVENOUS at 01:59

## 2025-03-27 RX ADMIN — Medication 1000 MILLIGRAM(S): at 11:26

## 2025-03-27 RX ADMIN — METOPROLOL SUCCINATE 25 MILLIGRAM(S): 50 TABLET, EXTENDED RELEASE ORAL at 17:12

## 2025-03-27 RX ADMIN — APIXABAN 5 MILLIGRAM(S): 2.5 TABLET, FILM COATED ORAL at 05:28

## 2025-03-27 RX ADMIN — Medication 100 MILLIGRAM(S): at 11:26

## 2025-03-27 NOTE — PROGRESS NOTE ADULT - PROBLEM SELECTOR PLAN 6
per ED provider endorsing RUQ pain however denies on writers assessment  - Negative [place] sign  -RUQ US: Cholelithiasis with borderline to mild gallbladder wall thickening.  -surgery consulted in ED    - Urology consult  - Surgery consult phos 1.5 on admission   s/p Kphos 30mM IV in ED  - monitor and supplement prn

## 2025-03-27 NOTE — PROGRESS NOTE ADULT - PROBLEM SELECTOR PLAN 3
p/w K 2.8; (03/27/25): 3.5   -s/p replacement in ED    -f/u repeat BMP  -monitor & replace electrolytes to maintain K>4 as above

## 2025-03-27 NOTE — PROGRESS NOTE ADULT - PROBLEM SELECTOR PLAN 9
DVT: eliquis hx of COPD   denying SOB however tachypneic with increased WOB   CXR mild pulmonary venous congestion  - c/w home nebs and albuterol HFA PRN

## 2025-03-27 NOTE — PROGRESS NOTE ADULT - ATTENDING COMMENTS
Patient seen and examined.  Case discussed with house staff.  Agree with above as edited.   Patient is a 85yoF with PMH of COPD, HTN, nephroureteral lithiasis, gout, DVT on eliquis, osteoporosis, prior breast CA s/p lumpectomy, chronic b/l LE edema, OA s/p right TKR, p/w acute L knee pain. Found to have COVID-19. ED course c/b SVT s/p adenosine. Admitted to telemetry given SVT with sepsis 2/2 UTI.  SVT - has now broken. Monitor on tele. Monitor BMP/Mg  Sepsis secondary to UTI - Blood and Urine cx pending. c/w IV ceftriaxone for now. Patient reports being prescribed an Abx but not finishing.  COVID-19 - CXR without infiltrates - supportive measures. Isolation precautions.  Knee pain secondary to OA - conservative measures for now. Outpatient f/u with ortho (already has an orthopedic surgeon)  Cholelithiasis - asymptomatic. No need for further w/u. Normal LFTs  Hypokalemia - Replete with KCl and monitor.   Hypomagnesemia - Replete with magnesium sulfate and monitor.   Hypophosphatemia - Replete with KPhos and monitor.

## 2025-03-27 NOTE — PROGRESS NOTE ADULT - PROBLEM SELECTOR PLAN 5
p/w magnesium 1.3; (03/27/25): 1.7  -s/p 2g IV magnesium in ED    -f/u and monitor repeat mg  -replace electrolytes to maintain Mg>2. K 2.8 on admission   s/p replacement in ED  - monitor and supplement prn  - tele monitoring

## 2025-03-27 NOTE — PROGRESS NOTE ADULT - PROBLEM SELECTOR PLAN 2
episode of SVT in ED  -s/p adenosine 6mg IV push    -monitor on telemetry  -c/w home metoprolol   -possibly in s/o electrolyte derangements  -replete electrolytes to maintain K>4 and Mg>2  -Cardiology consult in AM  -f/u TTE. as above

## 2025-03-27 NOTE — PROGRESS NOTE ADULT - PROBLEM SELECTOR PLAN 8
p/w chief complaint of severe L knee pain and inability to bear weight  -denies trauma or fall  -X ray shows narrowing of joint space (wet read) and severe OA in the lateral compartment with a small suprapatellar effusion.      -Ortho consult  -PT consult  -Pain control  -fall precautions. p/w chief complaint of severe L knee pain and inability to bear weight  -denies trauma or fall  -X ray shows narrowing of joint space (wet read) and severe OA in the lateral compartment with a small suprapatellar effusion.      -Ortho consult  -PT consult  -Pain control with Lidocaine patch  -fall precautions. RUQ US: cholelithiasis with borderline to mild gallbladder wall thickening  ingram sign negative

## 2025-03-27 NOTE — PROGRESS NOTE ADULT - PROBLEM SELECTOR PLAN 1
p/w fever 101.2F, HR >90, RR>20  -WBC and lactate WNL  -UA: WBC 0, RBC >50, positive nitrite, large leuk esterase, moderate bacteria  -CXR: L pleural effusion concerning for PNA (wet read  -COVID positive-->not requiring O2-->holding off on remdesivir and steroids for now  -s/p 2L as boluses in ED  -c/w maintenance IVF  -start ceftriaxone/azithromycin for CAP/UTI  -f/u BCx,, UCx  -ID consult in AM. p/w fever 101.2F, HR >90, RR>20  -WBC and lactate WNL  -UA: WBC 0, RBC >50, positive nitrite, large leuk esterase, moderate bacteria  -CXR: L pleural effusion concerning for PNA (wet read)?  -COVID positive-->not requiring O2-->holding off on remdesivir and steroids for now  -s/p IVF in ED   -s/p 2L as boluses in ED     -start ceftriaxone/azithromycin for CAP/UTI--- discontinue Azithromycin, continue Ceftriaxone  -f/u BCx,, UCx  -ID consult in AM. met SIRS criteria: 101.2 F, HR >90, RR>20  no leukocytosis, lactate WNL  UA: WBC 0, positive nitrite, large leuk esterase (recently completed nitrofurantoin)  CXR mild pulmonary venous congestion  found to be COVID positive (3/26)  - supportive treatment for COVID  - c/w ceftriaxone for possible UTI  - f/u BCx, UCx  - isolation precautions

## 2025-03-27 NOTE — PROGRESS NOTE ADULT - SUBJECTIVE AND OBJECTIVE BOX
MS3 Progress Note discussed with resident and attending    PLEASE MS TEAMS RESIDENT TILL 5:00 PM  PLEASE CONTACT ON CALL TEAM:  - On Call Team (Please refer to Jolie) FROM 5:00 PM - 8:30PM  - Nightfloat Team FROM 8:30 -7:30 AM      INTERVAL HPI/OVERNIGHT EVENTS: No acute events overnight.    SUBJECTIVE: Patient seen and examined at bedside this morning.    ---------------------------    REVIEW OF SYSTEMS:  CONSTITUTIONAL: No fever, weight loss, fatigue  RESPIRATORY: No cough, wheezing, chills, hemoptysis, shortness of breath  CARDIOVASCULAR: No chest pain, palpitations, dizziness, leg swelling  GASTROINTESTINAL: No abdominal pain, nausea, vomiting, hematemesis, diarrhea, constipation, melena, hematochezia  GENITOURINARY: No dysuria, hematuria, urinary frequency  NEUROLOGICAL: No headaches, memory loss, loss of strength, numbness, tremors  SKIN: No itching, burning, rashes, lesions     MEDICATIONS  (STANDING):  acetaminophen     Tablet .. 1000 milliGRAM(s) Oral every 8 hours  allopurinol 100 milliGRAM(s) Oral daily  apixaban 5 milliGRAM(s) Oral every 12 hours  buDESOnide    Inhalation Suspension 0.5 milliGRAM(s) Inhalation two times a day  cefTRIAXone   IVPB 1000 milliGRAM(s) IV Intermittent every 24 hours  metoprolol tartrate 25 milliGRAM(s) Oral two times a day  potassium phosphate / sodium phosphate Powder (PHOS-NaK) 1 Packet(s) Oral once    MEDICATIONS  (PRN):  albuterol    90 MICROgram(s) HFA Inhaler 2 Puff(s) Inhalation every 6 hours PRN for shortness of breath and/or wheezing      Vital Signs Last 24 Hrs  T(C): 37 (27 Mar 2025 11:06), Max: 38.4 (26 Mar 2025 16:33)  T(F): 98.6 (27 Mar 2025 11:06), Max: 101.2 (26 Mar 2025 16:33)  HR: 72 (27 Mar 2025 11:06) (70 - 109)  BP: 107/66 (27 Mar 2025 11:06) (104/73 - 150/78)  BP(mean): 76 (26 Mar 2025 23:56) (76 - 80)  RR: 19 (27 Mar 2025 11:06) (16 - 19)  SpO2: 100% (27 Mar 2025 11:06) (95% - 100%)    Parameters below as of 27 Mar 2025 11:06  Patient On (Oxygen Delivery Method): nasal cannula  O2 Flow (L/min): 2      -----------------------------    PHYSICAL EXAMINATION:  GENERAL: NAD, lying in bed  HEAD:  Atraumatic, Normocephalic  EYES:  conjunctiva and sclera clear  NECK: Supple, No JVD  CHEST/LUNG: Clear to auscultation bilaterally; No rales, rhonchi, wheezing, or rubs  HEART: Regular rate and rhythm; No murmurs, rubs, or gallops  ABDOMEN: Soft, Nontender, Nondistended; Bowel sounds present, no guarding  NERVOUS SYSTEM:  Alert & Oriented X3  : voiding well  EXTREMITIES:  2+ Peripheral Pulses, No clubbing, cyanosis, or edema  SKIN: warm dry                          10.3   9.56  )-----------( 155      ( 27 Mar 2025 06:06 )             31.9     03-27    142  |  114[H]  |  21[H]  ----------------------------<  133[H]  3.5   |  23  |  1.12    Ca    6.8[L]      27 Mar 2025 06:06  Phos  2.0     03-27  Mg     1.7     03-27    TPro  5.9[L]  /  Alb  2.3[L]  /  TBili  0.3  /  DBili  x   /  AST  17  /  ALT  15  /  AlkPhos  57  03-27    LIVER FUNCTIONS - ( 27 Mar 2025 06:06 )  Alb: 2.3 g/dL / Pro: 5.9 g/dL / ALK PHOS: 57 U/L / ALT: 15 U/L DA / AST: 17 U/L / GGT: x               PT/INR - ( 26 Mar 2025 16:35 )   PT: 18.3 sec;   INR: 1.57 ratio         PTT - ( 26 Mar 2025 16:35 )  PTT:33.3 sec    I&O's Summary          CAPILLARY BLOOD GLUCOSE      RADIOLOGY & ADDITIONAL TESTS:                   MS3 Progress Note discussed with resident and attending    PLEASE MS TEAMS RESIDENT TILL 5:00 PM  PLEASE CONTACT ON CALL TEAM:  - On Call Team (Please refer to Jolie) FROM 5:00 PM - 8:30PM  - Nightfloat Team FROM 8:30 -7:30 AM      INTERVAL HPI/OVERNIGHT EVENTS: Pt was admitted for SARsCoV2 infection, code sepsis, and SVT (requiring Adenosine push) on night of 03/26/25. Patient seen and examined at bedside this morning. Pt reports feeling better today. Pts reports persistent dry cough and left LE knee pain caused by any movement of the Left LE. Pt reports no other concerns.     ---------------------------    REVIEW OF SYSTEMS:  CONSTITUTIONAL: No fever, weight loss, fatigue  RESPIRATORY: No wheezing, chills, hemoptysis, shortness of breath; Positive dry cough  CARDIOVASCULAR: No chest pain, palpitations, dizziness; Positive leg swelling  GASTROINTESTINAL: No abdominal pain, nausea, vomiting, hematemesis, diarrhea, constipation, melena, hematochezia  GENITOURINARY: No dysuria, hematuria, urinary frequency  NEUROLOGICAL: No headaches, memory loss, loss of strength, numbness, tremors  SKIN: No itching, burning, rashes, lesions     MEDICATIONS  (STANDING):  acetaminophen     Tablet .. 1000 milliGRAM(s) Oral every 8 hours  allopurinol 100 milliGRAM(s) Oral daily  apixaban 5 milliGRAM(s) Oral every 12 hours  buDESOnide    Inhalation Suspension 0.5 milliGRAM(s) Inhalation two times a day  cefTRIAXone   IVPB 1000 milliGRAM(s) IV Intermittent every 24 hours  metoprolol tartrate 25 milliGRAM(s) Oral two times a day  potassium phosphate / sodium phosphate Powder (PHOS-NaK) 1 Packet(s) Oral once    MEDICATIONS  (PRN):  albuterol    90 MICROgram(s) HFA Inhaler 2 Puff(s) Inhalation every 6 hours PRN for shortness of breath and/or wheezing      Vital Signs Last 24 Hrs  T(C): 37 (27 Mar 2025 11:06), Max: 38.4 (26 Mar 2025 16:33)  T(F): 98.6 (27 Mar 2025 11:06), Max: 101.2 (26 Mar 2025 16:33)  HR: 72 (27 Mar 2025 11:06) (70 - 109)  BP: 107/66 (27 Mar 2025 11:06) (104/73 - 150/78)  BP(mean): 76 (26 Mar 2025 23:56) (76 - 80)  RR: 19 (27 Mar 2025 11:06) (16 - 19)  SpO2: 100% (27 Mar 2025 11:06) (95% - 100%)    Parameters below as of 27 Mar 2025 11:06  Patient On (Oxygen Delivery Method): nasal cannula  O2 Flow (L/min): 2      -----------------------------    PHYSICAL EXAMINATION:  GENERAL: NAD, lying in bed  HEAD:  Atraumatic, Normocephalic  EYES:  conjunctiva and sclera clear  NECK: Supple, No JVD  CHEST/LUNG: Clear to auscultation bilaterally; No rales, rhonchi, or rubs; Positive wheezing  HEART: Regular rate and rhythm; No murmurs, rubs, or gallops  ABDOMEN: Soft, Nontender, Nondistended; Bowel sounds present, no guarding  NERVOUS SYSTEM:  Alert & Oriented X3  : voiding well  EXTREMITIES:  2+ Peripheral Pulses, No clubbing, cyanosis, or edema  SKIN: warm dry                          10.3   9.56  )-----------( 155      ( 27 Mar 2025 06:06 )             31.9     03-27    142  |  114[H]  |  21[H]  ----------------------------<  133[H]  3.5   |  23  |  1.12    Ca    6.8[L]      27 Mar 2025 06:06  Phos  2.0     03-27  Mg     1.7     03-27    TPro  5.9[L]  /  Alb  2.3[L]  /  TBili  0.3  /  DBili  x   /  AST  17  /  ALT  15  /  AlkPhos  57  03-27    LIVER FUNCTIONS - ( 27 Mar 2025 06:06 )  Alb: 2.3 g/dL / Pro: 5.9 g/dL / ALK PHOS: 57 U/L / ALT: 15 U/L DA / AST: 17 U/L / GGT: x               PT/INR - ( 26 Mar 2025 16:35 )   PT: 18.3 sec;   INR: 1.57 ratio         PTT - ( 26 Mar 2025 16:35 )  PTT:33.3 sec    I&O's Summary          CAPILLARY BLOOD GLUCOSE      RADIOLOGY & ADDITIONAL TESTS:                   MS3 Progress Note discussed with resident and attending    PLEASE MS TEAMS RESIDENT TILL 5:00 PM  PLEASE CONTACT ON CALL TEAM:  - On Call Team (Please refer to Jolie) FROM 5:00 PM - 8:30PM  - Nightfloat Team FROM 8:30 -7:30 AM      INTERVAL HPI/OVERNIGHT EVENTS: Pt was admitted for SARsCoV2 infection, code sepsis, and SVT (requiring Adenosine push) on night of 03/26/25. Patient seen and examined at bedside this morning. Pt reports feeling better today. Pts reports persistent dry cough and left LE knee pain caused by any movement of the Left LE. Pt reports no other concerns.     ---------------------------    REVIEW OF SYSTEMS:  CONSTITUTIONAL: No fever, weight loss, fatigue  RESPIRATORY: No wheezing, chills, hemoptysis, shortness of breath; Positive dry cough  CARDIOVASCULAR: No chest pain, palpitations, dizziness; Positive leg swelling  GASTROINTESTINAL: No abdominal pain, nausea, vomiting, hematemesis, diarrhea, constipation, melena, hematochezia  GENITOURINARY: No dysuria, hematuria, urinary frequency  NEUROLOGICAL: No headaches, memory loss, loss of strength, numbness, tremors  SKIN: No itching, burning, rashes, lesions     MEDICATIONS  (STANDING):  acetaminophen     Tablet .. 1000 milliGRAM(s) Oral every 8 hours  allopurinol 100 milliGRAM(s) Oral daily  apixaban 5 milliGRAM(s) Oral every 12 hours  buDESOnide    Inhalation Suspension 0.5 milliGRAM(s) Inhalation two times a day  cefTRIAXone   IVPB 1000 milliGRAM(s) IV Intermittent every 24 hours  metoprolol tartrate 25 milliGRAM(s) Oral two times a day  potassium phosphate / sodium phosphate Powder (PHOS-NaK) 1 Packet(s) Oral once    MEDICATIONS  (PRN):  albuterol    90 MICROgram(s) HFA Inhaler 2 Puff(s) Inhalation every 6 hours PRN for shortness of breath and/or wheezing      Vital Signs Last 24 Hrs  T(C): 37 (27 Mar 2025 11:06), Max: 38.4 (26 Mar 2025 16:33)  T(F): 98.6 (27 Mar 2025 11:06), Max: 101.2 (26 Mar 2025 16:33)  HR: 72 (27 Mar 2025 11:06) (70 - 109)  BP: 107/66 (27 Mar 2025 11:06) (104/73 - 150/78)  BP(mean): 76 (26 Mar 2025 23:56) (76 - 80)  RR: 19 (27 Mar 2025 11:06) (16 - 19)  SpO2: 100% (27 Mar 2025 11:06) (95% - 100%)    Parameters below as of 27 Mar 2025 11:06  Patient On (Oxygen Delivery Method): nasal cannula  O2 Flow (L/min): 2      -----------------------------    PHYSICAL EXAMINATION:  GENERAL: NAD, lying in bed  HEAD:  Atraumatic, Normocephalic  EYES:  conjunctiva and sclera clear  NECK: Supple, No JVD  CHEST/LUNG: Clear to auscultation bilaterally; No rales, rhonchi, or rubs; Positive wheezing and increased work of breathing  HEART: Regular rate and rhythm; No murmurs, rubs, or gallops  ABDOMEN: Soft, Nontender, Nondistended; Bowel sounds present, no guarding  NERVOUS SYSTEM:  Alert & Oriented X3  : voiding well  EXTREMITIES:  2+ Peripheral Pulses, No clubbing, cyanosis; Positive B/L LE edema  SKIN: warm dry                          10.3   9.56  )-----------( 155      ( 27 Mar 2025 06:06 )             31.9     03-27    142  |  114[H]  |  21[H]  ----------------------------<  133[H]  3.5   |  23  |  1.12    Ca    6.8[L]      27 Mar 2025 06:06  Phos  2.0     03-27  Mg     1.7     03-27    TPro  5.9[L]  /  Alb  2.3[L]  /  TBili  0.3  /  DBili  x   /  AST  17  /  ALT  15  /  AlkPhos  57  03-27    LIVER FUNCTIONS - ( 27 Mar 2025 06:06 )  Alb: 2.3 g/dL / Pro: 5.9 g/dL / ALK PHOS: 57 U/L / ALT: 15 U/L DA / AST: 17 U/L / GGT: x               PT/INR - ( 26 Mar 2025 16:35 )   PT: 18.3 sec;   INR: 1.57 ratio         PTT - ( 26 Mar 2025 16:35 )  PTT:33.3 sec    I&O's Summary          CAPILLARY BLOOD GLUCOSE      RADIOLOGY & ADDITIONAL TESTS:                   MS3 Progress Note discussed with resident and attending    PLEASE MS TEAMS RESIDENT TILL 5:00 PM  PLEASE CONTACT ON CALL TEAM:  - On Call Team (Please refer to Jolie) FROM 5:00 PM - 8:30PM  - Nightfloat Team FROM 8:30 -7:30 AM      INTERVAL HPI/OVERNIGHT EVENTS: Pt was admitted for SARsCoV2 infection, code sepsis, and SVT (requiring Adenosine push) on night of 03/26/25. Patient seen and examined at bedside this morning. Pt reports feeling better today. Pt reports persistent dry cough and left LE knee pain caused by any movement of the Left LE. Pt reports no other concerns.     ---------------------------    REVIEW OF SYSTEMS:  CONSTITUTIONAL: No fever, weight loss, fatigue  RESPIRATORY: No wheezing, chills, hemoptysis, shortness of breath; Positive dry cough  CARDIOVASCULAR: No chest pain, palpitations, dizziness; Positive leg swelling  GASTROINTESTINAL: No abdominal pain, nausea, vomiting, hematemesis, diarrhea, constipation, melena, hematochezia  GENITOURINARY: No dysuria, hematuria, urinary frequency  NEUROLOGICAL: No headaches, memory loss, loss of strength, numbness, tremors  SKIN: No itching, burning, rashes, lesions     MEDICATIONS  (STANDING):  acetaminophen     Tablet .. 1000 milliGRAM(s) Oral every 8 hours  allopurinol 100 milliGRAM(s) Oral daily  apixaban 5 milliGRAM(s) Oral every 12 hours  buDESOnide    Inhalation Suspension 0.5 milliGRAM(s) Inhalation two times a day  cefTRIAXone   IVPB 1000 milliGRAM(s) IV Intermittent every 24 hours  metoprolol tartrate 25 milliGRAM(s) Oral two times a day  potassium phosphate / sodium phosphate Powder (PHOS-NaK) 1 Packet(s) Oral once    MEDICATIONS  (PRN):  albuterol    90 MICROgram(s) HFA Inhaler 2 Puff(s) Inhalation every 6 hours PRN for shortness of breath and/or wheezing      Vital Signs Last 24 Hrs  T(C): 37 (27 Mar 2025 11:06), Max: 38.4 (26 Mar 2025 16:33)  T(F): 98.6 (27 Mar 2025 11:06), Max: 101.2 (26 Mar 2025 16:33)  HR: 72 (27 Mar 2025 11:06) (70 - 109)  BP: 107/66 (27 Mar 2025 11:06) (104/73 - 150/78)  BP(mean): 76 (26 Mar 2025 23:56) (76 - 80)  RR: 19 (27 Mar 2025 11:06) (16 - 19)  SpO2: 100% (27 Mar 2025 11:06) (95% - 100%)    Parameters below as of 27 Mar 2025 11:06  Patient On (Oxygen Delivery Method): nasal cannula  O2 Flow (L/min): 2      -----------------------------    PHYSICAL EXAMINATION:  GENERAL: NAD, lying in bed  HEAD:  Atraumatic, Normocephalic  EYES:  conjunctiva and sclera clear  NECK: Supple, No JVD  CHEST/LUNG: Clear to auscultation bilaterally; No rales, rhonchi, or rubs; Positive wheezing and increased work of breathing  HEART: Regular rate and rhythm; No murmurs, rubs, or gallops  ABDOMEN: Soft, Nontender, Nondistended; Bowel sounds present, no guarding  NERVOUS SYSTEM:  Alert & Oriented X3  : voiding well  EXTREMITIES:  2+ Peripheral Pulses, No clubbing, cyanosis; Positive B/L LE edema  SKIN: warm dry                          10.3   9.56  )-----------( 155      ( 27 Mar 2025 06:06 )             31.9     03-27    142  |  114[H]  |  21[H]  ----------------------------<  133[H]  3.5   |  23  |  1.12    Ca    6.8[L]      27 Mar 2025 06:06  Phos  2.0     03-27  Mg     1.7     03-27    TPro  5.9[L]  /  Alb  2.3[L]  /  TBili  0.3  /  DBili  x   /  AST  17  /  ALT  15  /  AlkPhos  57  03-27    LIVER FUNCTIONS - ( 27 Mar 2025 06:06 )  Alb: 2.3 g/dL / Pro: 5.9 g/dL / ALK PHOS: 57 U/L / ALT: 15 U/L DA / AST: 17 U/L / GGT: x               PT/INR - ( 26 Mar 2025 16:35 )   PT: 18.3 sec;   INR: 1.57 ratio         PTT - ( 26 Mar 2025 16:35 )  PTT:33.3 sec    I&O's Summary          CAPILLARY BLOOD GLUCOSE      RADIOLOGY & ADDITIONAL TESTS:                   MS3 Progress Note discussed with resident and attending    PLEASE MS TEAMS RESIDENT TILL 5:00 PM  PLEASE CONTACT ON CALL TEAM:  - On Call Team (Please refer to Jolie) FROM 5:00 PM - 8:30PM  - Nightfloat Team FROM 8:30 -7:30 AM      INTERVAL HPI/OVERNIGHT EVENTS: Pt was admitted for SARsCoV2 infection, code sepsis, and SVT (requiring Adenosine push) on night of 03/26/25. Patient seen and examined at bedside this morning. Pt reports feeling better today. Pt reports persistent dry cough and left LE knee pain caused by any movement of the Left LE. Pt reports no other concerns.     ---------------------------    REVIEW OF SYSTEMS:  CONSTITUTIONAL: No fever, weight loss, fatigue  RESPIRATORY: No wheezing, chills, hemoptysis, shortness of breath; Positive dry cough  CARDIOVASCULAR: No chest pain, palpitations, dizziness; Positive leg swelling  GASTROINTESTINAL: No abdominal pain, nausea, vomiting, hematemesis, diarrhea, constipation, melena, hematochezia  GENITOURINARY: No dysuria, hematuria, urinary frequency  NEUROLOGICAL: No headaches, memory loss, loss of strength, numbness, tremors  SKIN: No itching, burning, rashes, lesions     MEDICATIONS  (STANDING):  acetaminophen     Tablet .. 1000 milliGRAM(s) Oral every 8 hours  allopurinol 100 milliGRAM(s) Oral daily  apixaban 5 milliGRAM(s) Oral every 12 hours  buDESOnide    Inhalation Suspension 0.5 milliGRAM(s) Inhalation two times a day  cefTRIAXone   IVPB 1000 milliGRAM(s) IV Intermittent every 24 hours  metoprolol tartrate 25 milliGRAM(s) Oral two times a day  potassium phosphate / sodium phosphate Powder (PHOS-NaK) 1 Packet(s) Oral once    MEDICATIONS  (PRN):  albuterol    90 MICROgram(s) HFA Inhaler 2 Puff(s) Inhalation every 6 hours PRN for shortness of breath and/or wheezing      Vital Signs Last 24 Hrs  T(C): 37 (27 Mar 2025 11:06), Max: 38.4 (26 Mar 2025 16:33)  T(F): 98.6 (27 Mar 2025 11:06), Max: 101.2 (26 Mar 2025 16:33)  HR: 72 (27 Mar 2025 11:06) (70 - 109)  BP: 107/66 (27 Mar 2025 11:06) (104/73 - 150/78)  BP(mean): 76 (26 Mar 2025 23:56) (76 - 80)  RR: 19 (27 Mar 2025 11:06) (16 - 19)  SpO2: 100% (27 Mar 2025 11:06) (95% - 100%)    Parameters below as of 27 Mar 2025 11:06  Patient On (Oxygen Delivery Method): nasal cannula  O2 Flow (L/min): 2      -----------------------------    PHYSICAL EXAMINATION:  GENERAL: NAD, lying in bed  HEAD:  Atraumatic, Normocephalic  EYES:  conjunctiva and sclera clear  NECK: Supple, No JVD  CHEST/LUNG: Clear to auscultation bilaterally; No rales, rhonchi, or rubs; Positive light wheezing and increased work of breathing  HEART: Regular rate and rhythm; No murmurs, rubs, or gallops  ABDOMEN: Soft, Nontender, Nondistended; Bowel sounds present, no guarding  NERVOUS SYSTEM:  Alert & Oriented X3  : voiding well  EXTREMITIES:  2+ Peripheral Pulses, No clubbing, cyanosis; Positive B/L LE edema  SKIN: warm dry                          10.3   9.56  )-----------( 155      ( 27 Mar 2025 06:06 )             31.9     03-27    142  |  114[H]  |  21[H]  ----------------------------<  133[H]  3.5   |  23  |  1.12    Ca    6.8[L]      27 Mar 2025 06:06  Phos  2.0     03-27  Mg     1.7     03-27    TPro  5.9[L]  /  Alb  2.3[L]  /  TBili  0.3  /  DBili  x   /  AST  17  /  ALT  15  /  AlkPhos  57  03-27    LIVER FUNCTIONS - ( 27 Mar 2025 06:06 )  Alb: 2.3 g/dL / Pro: 5.9 g/dL / ALK PHOS: 57 U/L / ALT: 15 U/L DA / AST: 17 U/L / GGT: x               PT/INR - ( 26 Mar 2025 16:35 )   PT: 18.3 sec;   INR: 1.57 ratio         PTT - ( 26 Mar 2025 16:35 )  PTT:33.3 sec    I&O's Summary          CAPILLARY BLOOD GLUCOSE      RADIOLOGY & ADDITIONAL TESTS:                   MS3 Progress Note discussed with resident and attending    PLEASE MS TEAMS RESIDENT TILL 5:00 PM  PLEASE CONTACT ON CALL TEAM:  - On Call Team (Please refer to Jolie) FROM 5:00 PM - 8:30PM  - Nightfloat Team FROM 8:30 -7:30 AM      INTERVAL HPI/OVERNIGHT EVENTS:   - Pt was admitted for SARsCoV2 infection, code sepsis, and SVT (requiring Adenosine push) on night of 03/26/25. Patient seen and examined at bedside this morning. Pt reports feeling better today. Pt reports persistent dry cough and left LE knee pain caused by any movement of the Left LE. Pt reports no other concerns.     ---------------------------    REVIEW OF SYSTEMS:  CONSTITUTIONAL: No fever, weight loss, fatigue  RESPIRATORY: No wheezing, chills, hemoptysis, shortness of breath; Positive dry cough  CARDIOVASCULAR: No chest pain, palpitations, dizziness; Positive leg swelling  GASTROINTESTINAL: No abdominal pain, nausea, vomiting, hematemesis, diarrhea, constipation, melena, hematochezia  GENITOURINARY: No dysuria, hematuria, urinary frequency  NEUROLOGICAL: No headaches, memory loss, loss of strength, numbness, tremors  SKIN: No itching, burning, rashes, lesions     MEDICATIONS  (STANDING):  acetaminophen     Tablet .. 1000 milliGRAM(s) Oral every 8 hours  allopurinol 100 milliGRAM(s) Oral daily  apixaban 5 milliGRAM(s) Oral every 12 hours  buDESOnide    Inhalation Suspension 0.5 milliGRAM(s) Inhalation two times a day  cefTRIAXone   IVPB 1000 milliGRAM(s) IV Intermittent every 24 hours  metoprolol tartrate 25 milliGRAM(s) Oral two times a day  potassium phosphate / sodium phosphate Powder (PHOS-NaK) 1 Packet(s) Oral once    MEDICATIONS  (PRN):  albuterol    90 MICROgram(s) HFA Inhaler 2 Puff(s) Inhalation every 6 hours PRN for shortness of breath and/or wheezing      Vital Signs Last 24 Hrs  T(C): 37 (27 Mar 2025 11:06), Max: 38.4 (26 Mar 2025 16:33)  T(F): 98.6 (27 Mar 2025 11:06), Max: 101.2 (26 Mar 2025 16:33)  HR: 72 (27 Mar 2025 11:06) (70 - 109)  BP: 107/66 (27 Mar 2025 11:06) (104/73 - 150/78)  BP(mean): 76 (26 Mar 2025 23:56) (76 - 80)  RR: 19 (27 Mar 2025 11:06) (16 - 19)  SpO2: 100% (27 Mar 2025 11:06) (95% - 100%)    Parameters below as of 27 Mar 2025 11:06  Patient On (Oxygen Delivery Method): nasal cannula  O2 Flow (L/min): 2    -----------------------------    PHYSICAL EXAMINATION:  GENERAL: NAD, lying in bed  HEAD:  Atraumatic, Normocephalic  EYES:  conjunctiva and sclera clear  NECK: Supple, No JVD  CHEST/LUNG: Clear to auscultation bilaterally; No rales, rhonchi, or rubs; Positive light wheezing and increased work of breathing  HEART: Regular rate and rhythm; No murmurs, rubs, or gallops  ABDOMEN: Soft, Nontender, Nondistended; Bowel sounds present, no guarding  NERVOUS SYSTEM:  Alert & Oriented X3  : voiding well  EXTREMITIES:  2+ Peripheral Pulses, No clubbing, cyanosis; Positive B/L LE edema  SKIN: warm dry                          10.3   9.56  )-----------( 155      ( 27 Mar 2025 06:06 )             31.9     03-27    142  |  114[H]  |  21[H]  ----------------------------<  133[H]  3.5   |  23  |  1.12    Ca    6.8[L]      27 Mar 2025 06:06  Phos  2.0     03-27  Mg     1.7     03-27    TPro  5.9[L]  /  Alb  2.3[L]  /  TBili  0.3  /  DBili  x   /  AST  17  /  ALT  15  /  AlkPhos  57  03-27    LIVER FUNCTIONS - ( 27 Mar 2025 06:06 )  Alb: 2.3 g/dL / Pro: 5.9 g/dL / ALK PHOS: 57 U/L / ALT: 15 U/L DA / AST: 17 U/L / GGT: x             PT/INR - ( 26 Mar 2025 16:35 )   PT: 18.3 sec;   INR: 1.57 ratio    PTT - ( 26 Mar 2025 16:35 )  PTT:33.3 sec

## 2025-03-27 NOTE — PROGRESS NOTE ADULT - PROBLEM SELECTOR PLAN 7
hx of COPD   -denying SOB however tachypneic with increased WOB   -saturating well on room air, no wheezing on exam, low suspicion for exacerbation  -CXR read: Mild cardiomegaly and mild pulmonary venous congestion  -c/w home nebs and albuterol HFA PRN  -abx as above. Mg 1.3 on admission  s/p 2g IV magnesium in ED  - monitor and supplement prn

## 2025-03-27 NOTE — PATIENT PROFILE ADULT - FALL HARM RISK - HARM RISK INTERVENTIONS

## 2025-03-27 NOTE — PROGRESS NOTE ADULT - PROBLEM SELECTOR PLAN 10
p/w severe L knee pain and inability to bear weight  Xray shows narrowing of joint space (wet read) and severe OA in the lateral compartment with a small suprapatellar effusion  hx of right total knee replacement   - fall precautions  - PT consult

## 2025-03-27 NOTE — PROGRESS NOTE ADULT - ASSESSMENT
85y F with PMH of COPD, HTN, nephroureteral lithiasis, gout, DVT on eliquis, osteoporosis, breast CA s/p lumpectomy, chronic b/l LE edema, and s/p right TKR, presenting with acute left knee pain and inability to bear weight since for 4 days. Incidentally found to be septic and covid positive in the ED with multiple electrolyte deficiencies. ED course also complicated by episode of unstable SVT requiring adenosine push. Admitted to telemetry for undifferentiated sepsis possibly 2/2 UTI vs. PNA and unstable SVT in s/o sepsis and multiple electrolyte abnormalities. 85 F, PMHx COPD, HTN, nephroureteral lithiasis, gout, DVT on eliquis, osteoporosis, breast CA s/p lumpectomy, chronic b/l LE edema, s/p right TKR, p/w acute L knee pain. Found to have COVID.ED course c/b SVT s/p adenosine. Admitted to telemetry for sepsis 2/2 UTI vs. PNA and SVT.

## 2025-03-27 NOTE — PATIENT PROFILE ADULT - FLU SEASON?
Erythromycin Counseling:  I discussed with the patient the risks of erythromycin including but not limited to GI upset, allergic reaction, drug rash, diarrhea, increase in liver enzymes, and yeast infections. Yes...

## 2025-03-27 NOTE — PATIENT PROFILE ADULT - NS PRO AD NO ADVANCE DIRECTIVE
January 10, 2022    To Whom It May Concern:         This is confirmation that Giuseppe Swift attended his scheduled appointment with Heike Hawthorne M.D. on 1/10/22. He may return to 1/16/2022 without any restrictions.          If you have any questions please do not hesitate to call me at the phone number listed below.    Sincerely,          Heike Hawthorne M.D.  [phone]                  
No

## 2025-03-27 NOTE — PROGRESS NOTE ADULT - PROBLEM SELECTOR PLAN 4
p/w phos 1.5; (03/27/25): 2.0  -s/p Kphos 30mM IV in ED    -f/u and monitor repeat phos  -monitor for refeeding syndrome in s/o recent poor po intake. episode of SVT in ED refractory to vagal maneuvers   likely ISO electrolyte derangements  s/p adenosine 6mg IV push  - c/w home metoprolol   - tele monitoring   - monitor electrolytes

## 2025-03-28 LAB
ANION GAP SERPL CALC-SCNC: 6 MMOL/L — SIGNIFICANT CHANGE UP (ref 5–17)
BUN SERPL-MCNC: 15 MG/DL — SIGNIFICANT CHANGE UP (ref 7–18)
CALCIUM SERPL-MCNC: 7.6 MG/DL — LOW (ref 8.4–10.5)
CHLORIDE SERPL-SCNC: 110 MMOL/L — HIGH (ref 96–108)
CO2 SERPL-SCNC: 25 MMOL/L — SIGNIFICANT CHANGE UP (ref 22–31)
CREAT SERPL-MCNC: 0.96 MG/DL — SIGNIFICANT CHANGE UP (ref 0.5–1.3)
CULTURE RESULTS: SIGNIFICANT CHANGE UP
EGFR: 57 ML/MIN/1.73M2 — LOW
EGFR: 57 ML/MIN/1.73M2 — LOW
GLUCOSE SERPL-MCNC: 107 MG/DL — HIGH (ref 70–99)
HCT VFR BLD CALC: 33 % — LOW (ref 34.5–45)
HGB BLD-MCNC: 10.7 G/DL — LOW (ref 11.5–15.5)
MAGNESIUM SERPL-MCNC: 1.8 MG/DL — SIGNIFICANT CHANGE UP (ref 1.6–2.6)
MCHC RBC-ENTMCNC: 28.1 PG — SIGNIFICANT CHANGE UP (ref 27–34)
MCHC RBC-ENTMCNC: 32.4 G/DL — SIGNIFICANT CHANGE UP (ref 32–36)
MCV RBC AUTO: 86.6 FL — SIGNIFICANT CHANGE UP (ref 80–100)
NRBC BLD AUTO-RTO: 0 /100 WBCS — SIGNIFICANT CHANGE UP (ref 0–0)
PHOSPHATE SERPL-MCNC: 2.5 MG/DL — SIGNIFICANT CHANGE UP (ref 2.5–4.5)
PLATELET # BLD AUTO: 177 K/UL — SIGNIFICANT CHANGE UP (ref 150–400)
POTASSIUM SERPL-MCNC: 3.7 MMOL/L — SIGNIFICANT CHANGE UP (ref 3.5–5.3)
POTASSIUM SERPL-SCNC: 3.7 MMOL/L — SIGNIFICANT CHANGE UP (ref 3.5–5.3)
RBC # BLD: 3.81 M/UL — SIGNIFICANT CHANGE UP (ref 3.8–5.2)
RBC # FLD: 14.4 % — SIGNIFICANT CHANGE UP (ref 10.3–14.5)
SODIUM SERPL-SCNC: 141 MMOL/L — SIGNIFICANT CHANGE UP (ref 135–145)
SPECIMEN SOURCE: SIGNIFICANT CHANGE UP
WBC # BLD: 8.12 K/UL — SIGNIFICANT CHANGE UP (ref 3.8–10.5)
WBC # FLD AUTO: 8.12 K/UL — SIGNIFICANT CHANGE UP (ref 3.8–10.5)

## 2025-03-28 PROCEDURE — 99232 SBSQ HOSP IP/OBS MODERATE 35: CPT | Mod: GC

## 2025-03-28 RX ADMIN — APIXABAN 5 MILLIGRAM(S): 2.5 TABLET, FILM COATED ORAL at 17:21

## 2025-03-28 RX ADMIN — METOPROLOL SUCCINATE 25 MILLIGRAM(S): 50 TABLET, EXTENDED RELEASE ORAL at 05:36

## 2025-03-28 RX ADMIN — CEFTRIAXONE 100 MILLIGRAM(S): 500 INJECTION, POWDER, FOR SOLUTION INTRAMUSCULAR; INTRAVENOUS at 17:23

## 2025-03-28 RX ADMIN — APIXABAN 5 MILLIGRAM(S): 2.5 TABLET, FILM COATED ORAL at 05:36

## 2025-03-28 RX ADMIN — Medication 100 MILLIGRAM(S): at 11:26

## 2025-03-28 RX ADMIN — Medication 1000 MILLIGRAM(S): at 22:18

## 2025-03-28 RX ADMIN — METOPROLOL SUCCINATE 25 MILLIGRAM(S): 50 TABLET, EXTENDED RELEASE ORAL at 17:21

## 2025-03-28 RX ADMIN — Medication 1000 MILLIGRAM(S): at 21:14

## 2025-03-28 NOTE — PROGRESS NOTE ADULT - PROBLEM SELECTOR PLAN 4
episode of SVT in ED refractory to vagal maneuvers   likely ISO electrolyte derangements  s/p adenosine 6mg IV push 3/27  no events noted on tele   - c/w home metoprolol   - tele monitoring   - monitor electrolytes

## 2025-03-28 NOTE — PROGRESS NOTE ADULT - PROBLEM SELECTOR PLAN 9
hx of COPD   denying SOB however tachypneic with increased WOB   CXR mild pulmonary venous congestion  - c/w home nebs and albuterol HFA PRN  - keep O2 between 88-92%

## 2025-03-28 NOTE — PHYSICAL THERAPY INITIAL EVALUATION ADULT - DIAGNOSIS, PT EVAL
(ICF Model) Pt. present w/deficits in Body Structures/Function (Impairments), incl: Strength, Balance, endurance, pain , leading to deficits in performing transfers, gait and ADL's

## 2025-03-28 NOTE — PROGRESS NOTE ADULT - ATTENDING COMMENTS
Patient seen and examined.  Case discussed with house staff.  Agree with above as edited.   Patient is a 85yoF with PMH of COPD, HTN, nephroureteral lithiasis, gout, DVT on eliquis, osteoporosis, prior breast CA s/p lumpectomy, chronic b/l LE edema, OA s/p right TKR, p/w acute L knee pain. Found to have COVID-19. ED course c/b SVT s/p adenosine. Admitted to telemetry given SVT with sepsis 2/2 UTI.  SVT - has now broken. Monitoring on tele. Monitoring  BMP/Mg  Sepsis secondary to UTI - Blood and Urine cx ngtd. c/w IV ceftriaxone for now. Patient reports being prescribed an Abx outpatient and then having it switched but not finishing. She believes she took it for 1 day but is not certain. Team has called PMD for Cx results but has not heard back yet.   COVID-19 - CXR without infiltrates - supportive measures. Isolation precautions as ordered.  Knee pain secondary to OA - conservative measures for now. Outpatient f/u with ortho (already has an orthopedic surgeon). d/w patient.  Cholelithiasis - asymptomatic. No need for further w/u. Normal LFTs  PT recs LEXI. Patient agreeable.  d/c planning  team will call family  Case d/w Case management, social work and nursing on IDRs

## 2025-03-28 NOTE — PROGRESS NOTE ADULT - SUBJECTIVE AND OBJECTIVE BOX
PGY-1 Progress Note discussed with attending    PAGER #: [[phone]] TILL 5:00 PM  PLEASE CONTACT ON CALL TEAM:  - On Call Team (Please refer to Jolie) FROM 5:00 PM - 8:30PM  - Nightfloat Team FROM 8:30 -7:30 AM    INTERVAL HPI/OVERNIGHT EVENTS:   - No acute events overnight.  Patient examined at bedside this AM.  Patient denies acute complaints, weaned off O2. Sat well on RA. Denies SOB.     REVIEW OF SYSTEMS:  CONSTITUTIONAL: No fever, weight loss, fatigue  RESPIRATORY: No wheezing, chills, shortness of breath  CARDIOVASCULAR: No chest pain, palpitations, dizziness  GASTROINTESTINAL: No abdominal pain, nausea, vomiting, diarrhea, constipation,   GENITOURINARY: No dysuria, hematuria, urinary frequency  NEUROLOGICAL: No headaches, memory loss, loss of strength, numbness, tremors  SKIN: No itching, burning, rashes, lesions     MEDICATIONS  (STANDING):  acetaminophen     Tablet .. 1000 milliGRAM(s) Oral every 8 hours  allopurinol 100 milliGRAM(s) Oral daily  apixaban 5 milliGRAM(s) Oral every 12 hours  buDESOnide    Inhalation Suspension 0.5 milliGRAM(s) Inhalation two times a day  cefTRIAXone   IVPB 1000 milliGRAM(s) IV Intermittent every 24 hours  metoprolol tartrate 25 milliGRAM(s) Oral two times a day    MEDICATIONS  (PRN):  albuterol    90 MICROgram(s) HFA Inhaler 2 Puff(s) Inhalation every 6 hours PRN for shortness of breath and/or wheezing      Vital Signs Last 24 Hrs  T(C): 37.2 (28 Mar 2025 11:11), Max: 37.4 (28 Mar 2025 00:35)  T(F): 99 (28 Mar 2025 11:11), Max: 99.3 (28 Mar 2025 00:35)  HR: 75 (28 Mar 2025 11:11) (70 - 96)  BP: 123/59 (28 Mar 2025 11:11) (105/41 - 139/73)  BP(mean): 77 (28 Mar 2025 11:11) (77 - 88)  RR: 18 (28 Mar 2025 11:11) (18 - 20)  SpO2: 92% (28 Mar 2025 11:11) (92% - 97%)    Parameters below as of 28 Mar 2025 11:11  Patient On (Oxygen Delivery Method): room air      PHYSICAL EXAMINATION:  GENERAL: NAD, sitting in chair   HEAD:  Atraumatic, Normocephalic  EYES:  conjunctiva and sclera clear  NECK: Supple, No JVD  CHEST/LUNG: Intermittent cough. Clear to auscultation bilaterally; No rales, rhonchi, or rubs  HEART: Regular rate and rhythm; No murmurs, rubs, or gallops  ABDOMEN: Soft, Nontender, Nondistended; Bowel sounds present, no guarding  NERVOUS SYSTEM:  Alert & Oriented X3, speech intact  : voiding well  EXTREMITIES: 2+ Peripheral Pulses, B/L edema. No clubbing, cyanosis  SKIN: warm dry                          10.7   8.12  )-----------( 177      ( 28 Mar 2025 06:00 )             33.0     03-28    141  |  110[H]  |  15  ----------------------------<  107[H]  3.7   |  25  |  0.96    Ca    7.6[L]      28 Mar 2025 06:00  Phos  2.5     03-28  Mg     1.8     03-28    TPro  5.9[L]  /  Alb  2.3[L]  /  TBili  0.3  /  DBili  x   /  AST  17  /  ALT  15  /  AlkPhos  57  03-27    LIVER FUNCTIONS - ( 27 Mar 2025 06:06 )  Alb: 2.3 g/dL / Pro: 5.9 g/dL / ALK PHOS: 57 U/L / ALT: 15 U/L DA / AST: 17 U/L / GGT: x           PT/INR - ( 26 Mar 2025 16:35 )   PT: 18.3 sec;   INR: 1.57 ratio    PTT - ( 26 Mar 2025 16:35 )  PTT:33.3 sec

## 2025-03-28 NOTE — PROGRESS NOTE ADULT - ASSESSMENT
85 F, PMHx COPD, HTN, nephroureteral lithiasis, gout, DVT on eliquis, osteoporosis, breast CA s/p lumpectomy, chronic b/l LE edema, s/p right TKR, p/w acute L knee pain. Found to have COVID.ED course c/b SVT s/p adenosine. Admitted to telemetry for sepsis 2/2 UTI vs. PNA and SVT.

## 2025-03-28 NOTE — PHYSICAL THERAPY INITIAL EVALUATION ADULT - LONG TERM MEMORY, REHAB EVAL
[FreeTextEntry1] : SANDRA CESAR is a 49 year old female patient presenting to the clinic today for follow-up.\par \par #PE/Vitals\par - stable \par \par #Reviewed Lab Results\par - elevated AST level 232 U/L\par - elevated  ALT level 94 U/L\par - elevated cholesterol level 223 mg/dL\par - elevated Vitamin D level 96 ng/mL\par - electrolyte level stable \par \par #Hypothyroidism\par - pt been gaining weight since taking Levothyroxine Sodium 50 MCG
impaired

## 2025-03-28 NOTE — PROGRESS NOTE ADULT - PROBLEM SELECTOR PLAN 1
met SIRS criteria: 101.2 F, HR >90, RR>20  no leukocytosis, lactate WNL  UA: WBC 0, positive nitrite, large leuk esterase (recently on nitrofurantoin)  CXR mild pulmonary venous congestion  found to be COVID positive (3/26), weaned off O2   BCx neg, UCx showing normal naldo  unable to reach PMD for UCx results   - supportive treatment for COVID  - transition to cefpodoxime x 2 days for UTI coverage   - isolation precautions

## 2025-03-28 NOTE — PROGRESS NOTE ADULT - PROBLEM SELECTOR PLAN 5
RESOLVED   K 2.8 on admission   s/p replacement in ED  - monitor and supplement prn  - tele monitoring

## 2025-03-28 NOTE — PROGRESS NOTE ADULT - PROBLEM SELECTOR PLAN 8
RUQ US: cholelithiasis with borderline to mild gallbladder wall thickening  ingram sign negative  - f/u OP

## 2025-03-29 LAB
ANION GAP SERPL CALC-SCNC: 4 MMOL/L — LOW (ref 5–17)
BUN SERPL-MCNC: 18 MG/DL — SIGNIFICANT CHANGE UP (ref 7–18)
CALCIUM SERPL-MCNC: 8.5 MG/DL — SIGNIFICANT CHANGE UP (ref 8.4–10.5)
CHLORIDE SERPL-SCNC: 110 MMOL/L — HIGH (ref 96–108)
CO2 SERPL-SCNC: 27 MMOL/L — SIGNIFICANT CHANGE UP (ref 22–31)
CREAT SERPL-MCNC: 1.02 MG/DL — SIGNIFICANT CHANGE UP (ref 0.5–1.3)
EGFR: 53 ML/MIN/1.73M2 — LOW
EGFR: 53 ML/MIN/1.73M2 — LOW
GLUCOSE SERPL-MCNC: 107 MG/DL — HIGH (ref 70–99)
HCT VFR BLD CALC: 33 % — LOW (ref 34.5–45)
HGB BLD-MCNC: 10.7 G/DL — LOW (ref 11.5–15.5)
MAGNESIUM SERPL-MCNC: 1.7 MG/DL — SIGNIFICANT CHANGE UP (ref 1.6–2.6)
MCHC RBC-ENTMCNC: 27.9 PG — SIGNIFICANT CHANGE UP (ref 27–34)
MCHC RBC-ENTMCNC: 32.4 G/DL — SIGNIFICANT CHANGE UP (ref 32–36)
MCV RBC AUTO: 85.9 FL — SIGNIFICANT CHANGE UP (ref 80–100)
NRBC BLD AUTO-RTO: 0 /100 WBCS — SIGNIFICANT CHANGE UP (ref 0–0)
PHOSPHATE SERPL-MCNC: 3 MG/DL — SIGNIFICANT CHANGE UP (ref 2.5–4.5)
PLATELET # BLD AUTO: 203 K/UL — SIGNIFICANT CHANGE UP (ref 150–400)
POTASSIUM SERPL-MCNC: 3.6 MMOL/L — SIGNIFICANT CHANGE UP (ref 3.5–5.3)
POTASSIUM SERPL-SCNC: 3.6 MMOL/L — SIGNIFICANT CHANGE UP (ref 3.5–5.3)
RBC # BLD: 3.84 M/UL — SIGNIFICANT CHANGE UP (ref 3.8–5.2)
RBC # FLD: 14.4 % — SIGNIFICANT CHANGE UP (ref 10.3–14.5)
SODIUM SERPL-SCNC: 141 MMOL/L — SIGNIFICANT CHANGE UP (ref 135–145)
WBC # BLD: 7.36 K/UL — SIGNIFICANT CHANGE UP (ref 3.8–10.5)
WBC # FLD AUTO: 7.36 K/UL — SIGNIFICANT CHANGE UP (ref 3.8–10.5)

## 2025-03-29 PROCEDURE — 99232 SBSQ HOSP IP/OBS MODERATE 35: CPT

## 2025-03-29 RX ADMIN — Medication 1000 MILLIGRAM(S): at 06:40

## 2025-03-29 RX ADMIN — CEFTRIAXONE 100 MILLIGRAM(S): 500 INJECTION, POWDER, FOR SOLUTION INTRAMUSCULAR; INTRAVENOUS at 17:03

## 2025-03-29 RX ADMIN — Medication 100 MILLIGRAM(S): at 13:05

## 2025-03-29 RX ADMIN — APIXABAN 5 MILLIGRAM(S): 2.5 TABLET, FILM COATED ORAL at 05:28

## 2025-03-29 RX ADMIN — METOPROLOL SUCCINATE 25 MILLIGRAM(S): 50 TABLET, EXTENDED RELEASE ORAL at 05:28

## 2025-03-29 RX ADMIN — Medication 1000 MILLIGRAM(S): at 22:27

## 2025-03-29 RX ADMIN — APIXABAN 5 MILLIGRAM(S): 2.5 TABLET, FILM COATED ORAL at 17:03

## 2025-03-29 RX ADMIN — Medication 1000 MILLIGRAM(S): at 21:27

## 2025-03-29 RX ADMIN — Medication 1000 MILLIGRAM(S): at 05:28

## 2025-03-29 RX ADMIN — METOPROLOL SUCCINATE 25 MILLIGRAM(S): 50 TABLET, EXTENDED RELEASE ORAL at 17:03

## 2025-03-29 NOTE — PROGRESS NOTE ADULT - SUBJECTIVE AND OBJECTIVE BOX
CC: L knee pain  S:  no complaints  O:  Vital Signs Last 24 Hrs  T(C): 36.2 (03-29-25 @ 11:08), Max: 37.1 (03-28-25 @ 15:36)  T(F): 97.2 (03-29-25 @ 11:08), Max: 98.8 (03-28-25 @ 20:57)  HR: 68 (03-29-25 @ 11:08) (62 - 78)  BP: 117/59 (03-29-25 @ 11:08) (109/59 - 142/60)  RR: 18 (03-29-25 @ 11:08) (18 - 18)  SpO2: 93% (03-29-25 @ 11:08) (93% - 96%)      PE:  Gen: Oriented, alert, No acute distress Eyes: conjunctivae and lid normal, sclera clear with no icterus ENT: nose and throat exam normal CVS: S1, S2, RRR; no murmur, no rubs, no gallops Pulm: Good air exchange, Breath sounds equal bilaterally, no rales rhonchi,  no wheezes Chest: nontender, no chest deformity, chest movement symmetrical Gl: abdomen soft, nontender, nondistended, bowel sounds normoactive, no masses palpated Musk: no msk pain, BLE edema Skin: no skin lesions, skin turgor normal, warm and well perfused Neuro: Awake, alert,Psych: normal affect, insight

## 2025-03-29 NOTE — PROGRESS NOTE ADULT - ASSESSMENT
A/P:  Traci Castillo is a  86yo woman with PMH of COPD, HTN, nephroureteral lithiasis, gout, DVT on eliquis, osteoporosis, prior breast CA s/p lumpectomy, chronic b/l LE edema, OA s/p right TKR, p/w acute L knee pain. Found to have COVID-19. ED course c/b SVT s/p adenosine. Admitted to telemetry given SVT with sepsis 2/2 UTI.  #SVT - has now broken. Monitoring on tele. Monitoring  BMP/Mg  #Sepsis secondary to UTI - Blood and Urine cx ngtd. c/w IV ceftriaxone for now. Patient reports being prescribed an Abx outpatient and then having it switched but not finishing. She believes she took it for 1 day but is not certain. Team has called PMD for Cx results but has not heard back yet.   #COVID-19 - CXR without infiltrates - supportive measures. Isolation precautions as ordered.  #Knee pain secondary to OA - conservative measures for now. Outpatient f/u with ortho (already has an orthopedic surgeon). d/w patient.  #Cholelithiasis - asymptomatic. No need for further w/u. Normal LFTs  PT recs LEXI. Patient agreeable.  d/c planning        Labs reviewed:                         10.7   7.36  )-----------( 203      ( 29 Mar 2025 06:40 )             33.0   03-29    141  |  110[H]  |  18  ----------------------------<  107[H]  3.6   |  27  |  1.02    Ca    8.5      29 Mar 2025 06:40  Phos  3.0     03-29  Mg     1.7     03-29      Imaging reviewed:   US Doppler bilat:  No evidence of deep venous thrombosis in either lower extremity.    US Abd:  IMPRESSION:  Cholelithiasis with borderline to mild gallbladder wall thickening.   Correlate with symptomatology. If there is a persistent concern for   cholecystitis. HIDA scan can be obtained.  No biliary dilatation.    CXR:  IMPRESSION:    Severe OA in the lateral compartment with a small suprapatellar effusion.    Mild cardiomegaly and mild pulmonary venous congestion.    L knee Xray:  IMPRESSION:    Severe OA in the lateral compartment with a small suprapatellar effusion.    Mild cardiomegaly and mild pulmonary venous congestion.

## 2025-03-30 DIAGNOSIS — M19.90 UNSPECIFIED OSTEOARTHRITIS, UNSPECIFIED SITE: ICD-10-CM

## 2025-03-30 LAB
ANION GAP SERPL CALC-SCNC: 5 MMOL/L — SIGNIFICANT CHANGE UP (ref 5–17)
BUN SERPL-MCNC: 19 MG/DL — HIGH (ref 7–18)
CALCIUM SERPL-MCNC: 8.9 MG/DL — SIGNIFICANT CHANGE UP (ref 8.4–10.5)
CHLORIDE SERPL-SCNC: 109 MMOL/L — HIGH (ref 96–108)
CO2 SERPL-SCNC: 27 MMOL/L — SIGNIFICANT CHANGE UP (ref 22–31)
CREAT SERPL-MCNC: 1.07 MG/DL — SIGNIFICANT CHANGE UP (ref 0.5–1.3)
EGFR: 50 ML/MIN/1.73M2 — LOW
EGFR: 50 ML/MIN/1.73M2 — LOW
GLUCOSE SERPL-MCNC: 111 MG/DL — HIGH (ref 70–99)
HCT VFR BLD CALC: 33.4 % — LOW (ref 34.5–45)
HGB BLD-MCNC: 10.8 G/DL — LOW (ref 11.5–15.5)
MAGNESIUM SERPL-MCNC: 1.5 MG/DL — LOW (ref 1.6–2.6)
MCHC RBC-ENTMCNC: 27.8 PG — SIGNIFICANT CHANGE UP (ref 27–34)
MCHC RBC-ENTMCNC: 32.3 G/DL — SIGNIFICANT CHANGE UP (ref 32–36)
MCV RBC AUTO: 86.1 FL — SIGNIFICANT CHANGE UP (ref 80–100)
NRBC BLD AUTO-RTO: 0 /100 WBCS — SIGNIFICANT CHANGE UP (ref 0–0)
PHOSPHATE SERPL-MCNC: 3.9 MG/DL — SIGNIFICANT CHANGE UP (ref 2.5–4.5)
PLATELET # BLD AUTO: 222 K/UL — SIGNIFICANT CHANGE UP (ref 150–400)
POTASSIUM SERPL-MCNC: 3.8 MMOL/L — SIGNIFICANT CHANGE UP (ref 3.5–5.3)
POTASSIUM SERPL-SCNC: 3.8 MMOL/L — SIGNIFICANT CHANGE UP (ref 3.5–5.3)
RBC # BLD: 3.88 M/UL — SIGNIFICANT CHANGE UP (ref 3.8–5.2)
RBC # FLD: 14.5 % — SIGNIFICANT CHANGE UP (ref 10.3–14.5)
SODIUM SERPL-SCNC: 141 MMOL/L — SIGNIFICANT CHANGE UP (ref 135–145)
WBC # BLD: 7.7 K/UL — SIGNIFICANT CHANGE UP (ref 3.8–10.5)
WBC # FLD AUTO: 7.7 K/UL — SIGNIFICANT CHANGE UP (ref 3.8–10.5)

## 2025-03-30 PROCEDURE — 99232 SBSQ HOSP IP/OBS MODERATE 35: CPT | Mod: GC

## 2025-03-30 RX ORDER — MELATONIN 5 MG
3 TABLET ORAL AT BEDTIME
Refills: 0 | Status: DISCONTINUED | OUTPATIENT
Start: 2025-03-30 | End: 2025-03-31

## 2025-03-30 RX ORDER — MAGNESIUM SULFATE 500 MG/ML
2 SYRINGE (ML) INJECTION ONCE
Refills: 0 | Status: COMPLETED | OUTPATIENT
Start: 2025-03-30 | End: 2025-03-30

## 2025-03-30 RX ORDER — ACETAMINOPHEN 500 MG/5ML
2 LIQUID (ML) ORAL
Qty: 30 | Refills: 0
Start: 2025-03-30

## 2025-03-30 RX ADMIN — METOPROLOL SUCCINATE 25 MILLIGRAM(S): 50 TABLET, EXTENDED RELEASE ORAL at 05:13

## 2025-03-30 RX ADMIN — Medication 1000 MILLIGRAM(S): at 05:13

## 2025-03-30 RX ADMIN — Medication 3 MILLIGRAM(S): at 21:29

## 2025-03-30 RX ADMIN — Medication 1000 MILLIGRAM(S): at 22:30

## 2025-03-30 RX ADMIN — APIXABAN 5 MILLIGRAM(S): 2.5 TABLET, FILM COATED ORAL at 17:02

## 2025-03-30 RX ADMIN — APIXABAN 5 MILLIGRAM(S): 2.5 TABLET, FILM COATED ORAL at 05:13

## 2025-03-30 RX ADMIN — Medication 100 MILLIGRAM(S): at 11:16

## 2025-03-30 RX ADMIN — METOPROLOL SUCCINATE 25 MILLIGRAM(S): 50 TABLET, EXTENDED RELEASE ORAL at 17:02

## 2025-03-30 RX ADMIN — CEFTRIAXONE 100 MILLIGRAM(S): 500 INJECTION, POWDER, FOR SOLUTION INTRAMUSCULAR; INTRAVENOUS at 17:03

## 2025-03-30 RX ADMIN — Medication 25 GRAM(S): at 08:58

## 2025-03-30 RX ADMIN — Medication 1000 MILLIGRAM(S): at 06:13

## 2025-03-30 RX ADMIN — Medication 1000 MILLIGRAM(S): at 21:30

## 2025-03-30 NOTE — DISCHARGE NOTE PROVIDER - ATTENDING DISCHARGE PHYSICAL EXAMINATION:
PE:  Gen: Oriented, alert, No acute distress Eyes: conjunctivae and lid normal, sclera clear with no icterus ENT: nose and throat exam normal CVS: S1, S2, RRR; no murmur, no rubs, no gallops Pulm: Good air exchange, Breath sounds equal bilaterally, no rales rhonchi,  no wheezes Chest: nontender, no chest deformity, chest movement symmetrical Gl: abdomen soft, nontender, nondistended, bowel sounds normoactive, no masses palpated Musk: no msk pain, BLE edema Skin: no skin lesions, skin turgor normal, warm and well perfused Neuro: Awake, alert,Psych: normal affect, insight

## 2025-03-30 NOTE — DISCHARGE NOTE PROVIDER - CARE PROVIDER_API CALL
Vale Mora  [facility]  [address] [place] [address]  Phone: [phone]  Fax: [phone]  Established Patient  Follow Up Time: 2 weeks

## 2025-03-30 NOTE — PROGRESS NOTE ADULT - ASSESSMENT
85 F, PMHx COPD, HTN, nephroureteral lithiasis, gout, DVT on eliquis, osteoporosis, breast CA s/p lumpectomy, chronic b/l LE edema, s/p right TKR, p/w acute L knee pain. Found to have COVID.ED course c/b SVT s/p adenosine. Admitted to telemetry for SVT and sepsis 2/2 UTI. Pending LEXI.

## 2025-03-30 NOTE — PROGRESS NOTE ADULT - PROBLEM SELECTOR PLAN 10
p/w severe L knee pain and inability to bear weight  Xray shows narrowing of joint space (wet read) and severe OA in the lateral compartment with a small suprapatellar effusion  hx of right total knee replacement   PT recc LEXI   - fall precautions p/w severe L knee pain and inability to bear weight  Xray shows narrowing of joint space (wet read) and severe OA in the lateral compartment with a small suprapatellar effusion  hx of right total knee replacement   PT recc LEXI   - conservative management  - f/u orthopedic OP   - fall precautions

## 2025-03-30 NOTE — PROGRESS NOTE ADULT - PROBLEM SELECTOR PLAN 1
met SIRS criteria: 101.2 F, HR >90, RR>20  no leukocytosis, lactate WNL  UA: WBC 0, positive nitrite, large leuk esterase (recently on nitrofurantoin)  CXR mild pulmonary venous congestion  found to be COVID positive (3/26), weaned off O2   BCx neg, UCx showing normal naldo  unable to reach PMD for UCx results   - supportive treatment for COVID  - c/w CTX for UTI coverage, last dose 3/30    - isolation precautions

## 2025-03-30 NOTE — DISCHARGE NOTE PROVIDER - NSDCMRMEDTOKEN_GEN_ALL_CORE_FT
albuterol 90 mcg/inh inhalation aerosol: 2 puff(s) inhaled every 6 hours as needed for  shortness of breath and/or wheezing  ALLOPURINOL 100MG TAB: 1 tab(s) orally once a day  budesonide 0.5 mg/2 mL inhalation suspension: 2 milliliter(s) by nebulizer 2 times a day  Eliquis 5 mg oral tablet: 1 tab(s) orally 2 times a day  fluticasone 50 mcg/inh nasal spray: 1 spray(s) in each nostril once a day  ipratropium-albuterol 0.5 mg-2.5 mg/3 mL inhalation solution: 3 milliliter(s) by nebulizer 2 times a day  losartan 25 mg oral tablet: 1 tab(s) orally once a day  metoprolol tartrate 25 mg oral tablet: 1 tab(s) orally 2 times a day  RISEDRONATE  MG TAB: 1 tab(s) orally once a week   acetaminophen 500 mg oral tablet: 2 tab(s) orally every 8 hours as needed for  mild pain  albuterol 90 mcg/inh inhalation aerosol: 2 puff(s) inhaled every 6 hours as needed for  shortness of breath and/or wheezing  ALLOPURINOL 100MG TAB: 1 tab(s) orally once a day  budesonide 0.5 mg/2 mL inhalation suspension: 2 milliliter(s) by nebulizer 2 times a day  Eliquis 5 mg oral tablet: 1 tab(s) orally 2 times a day  fluticasone 50 mcg/inh nasal spray: 1 spray(s) in each nostril once a day  ipratropium-albuterol 0.5 mg-2.5 mg/3 mL inhalation solution: 3 milliliter(s) by nebulizer 2 times a day  losartan 25 mg oral tablet: 1 tab(s) orally once a day  metoprolol tartrate 25 mg oral tablet: 1 tab(s) orally 2 times a day  RISEDRONATE  MG TAB: 1 tab(s) orally once a week

## 2025-03-30 NOTE — PROGRESS NOTE ADULT - SUBJECTIVE AND OBJECTIVE BOX
PGY-1 Progress Note discussed with attending    PAGER #: [[phone]] TILL 5:00 PM  PLEASE CONTACT ON CALL TEAM:  - On Call Team (Please refer to Jolie) FROM 5:00 PM - 8:30PM  - Nightfloat Team FROM 8:30 -7:30 AM    INTERVAL HPI/OVERNIGHT EVENTS:   -     REVIEW OF SYSTEMS:  CONSTITUTIONAL: No fever, weight loss, or fatigue  RESPIRATORY: No cough, wheezing, chills or hemoptysis; No shortness of breath  CARDIOVASCULAR: No chest pain, palpitations, dizziness, or leg swelling  GASTROINTESTINAL: No abdominal pain. No nausea, vomiting, or hematemesis; No diarrhea or constipation. No melena or hematochezia.  GENITOURINARY: No dysuria or hematuria, urinary frequency  NEUROLOGICAL: No headaches, memory loss, loss of strength, numbness, or tremors  SKIN: No itching, burning, rashes, or lesions     MEDICATIONS  (STANDING):  acetaminophen     Tablet .. 1000 milliGRAM(s) Oral every 8 hours  allopurinol 100 milliGRAM(s) Oral daily  apixaban 5 milliGRAM(s) Oral every 12 hours  buDESOnide    Inhalation Suspension 0.5 milliGRAM(s) Inhalation two times a day  cefTRIAXone   IVPB 1000 milliGRAM(s) IV Intermittent every 24 hours  magnesium sulfate  IVPB 2 Gram(s) IV Intermittent once  metoprolol tartrate 25 milliGRAM(s) Oral two times a day    MEDICATIONS  (PRN):  albuterol    90 MICROgram(s) HFA Inhaler 2 Puff(s) Inhalation every 6 hours PRN for shortness of breath and/or wheezing      Vital Signs Last 24 Hrs  T(C): 36.4 (30 Mar 2025 05:10), Max: 36.9 (29 Mar 2025 23:35)  T(F): 97.5 (30 Mar 2025 05:10), Max: 98.4 (29 Mar 2025 23:35)  HR: 71 (30 Mar 2025 05:10) (68 - 80)  BP: 134/64 (30 Mar 2025 05:10) (117/59 - 143/78)  BP(mean): --  RR: 18 (30 Mar 2025 05:10) (18 - 19)  SpO2: 94% (30 Mar 2025 05:10) (93% - 94%)    Parameters below as of 30 Mar 2025 05:10  Patient On (Oxygen Delivery Method): room air        PHYSICAL EXAMINATION:  GENERAL: NAD, well-groomed, well-developed, lying in bed comfortably  HEAD:  Atraumatic, normocephalic  EYES: EOMI, PERRLA, conjunctiva and sclera clear  ENT: Moist mucous membranes, good dentition, no lesions  NECK: Supple, normal appearance, no JVD, normal thyroid, trachea midline  CHEST/LUNG: Clear to auscultation bilaterally; no rales, rhonchi, wheezing, or rubs; no respiratory distress, no use of accessory muscles  HEART: Regular rate and rhythm; no murmurs, rubs, or gallops  ABDOMEN: Soft, nontender, nondistended; no rebound, no guarding, no palpable masses; bowel sounds present  GENITOURINARY: No suprapubic tenderness, no CVA tenderness  EXTREMITIES/MSK: 2+ peripheral pulses, brisk capillary refill; no clubbing, cyanosis, or edema  NERVOUS SYSTEM:  Alert & Oriented X3, speech clear, no deficits; all 4 extremities, full and equal 5/5 strength; DTRs 2+ intact and symmetric   LYMPH: No lymphadenopathy noted  SKIN: Warm, dry, no rashes or lesions                            10.8   7.70  )-----------( 222      ( 30 Mar 2025 06:00 )             33.4     03-30    141  |  109[H]  |  19[H]  ----------------------------<  111[H]  3.8   |  27  |  1.07    Ca    8.9      30 Mar 2025 06:00  Phos  3.9     03-30  Mg     1.5     03-30                  CAPILLARY BLOOD GLUCOSE          RADIOLOGY & ADDITIONAL TESTS:       PGY-1 Progress Note discussed with attending    PAGER #: [[phone]] TILL 5:00 PM  PLEASE CONTACT ON CALL TEAM:  - On Call Team (Please refer to Jolie) FROM 5:00 PM - 8:30PM  - Nightfloat Team FROM 8:30 -7:30 AM    INTERVAL HPI/OVERNIGHT EVENTS:   - No acute events overnight.  Patient examined at bedside this AM.  Patient complains of disturbed sleep overnight otherwise denies acute complaints. LLE pain has improved since admission.     REVIEW OF SYSTEMS:  CONSTITUTIONAL: No fever, weight loss, fatigue  RESPIRATORY: No wheezing, chills, shortness of breath  CARDIOVASCULAR: No chest pain, palpitations, dizziness  GASTROINTESTINAL: No abdominal pain, nausea, vomiting, diarrhea, constipation,   GENITOURINARY: No dysuria, hematuria, urinary frequency  NEUROLOGICAL: No headaches, memory loss, loss of strength, numbness, tremors  SKIN: No itching, burning, rashes, lesions      MEDICATIONS  (STANDING):  acetaminophen     Tablet .. 1000 milliGRAM(s) Oral every 8 hours  allopurinol 100 milliGRAM(s) Oral daily  apixaban 5 milliGRAM(s) Oral every 12 hours  buDESOnide    Inhalation Suspension 0.5 milliGRAM(s) Inhalation two times a day  cefTRIAXone   IVPB 1000 milliGRAM(s) IV Intermittent every 24 hours  magnesium sulfate  IVPB 2 Gram(s) IV Intermittent once  metoprolol tartrate 25 milliGRAM(s) Oral two times a day    MEDICATIONS  (PRN):  albuterol    90 MICROgram(s) HFA Inhaler 2 Puff(s) Inhalation every 6 hours PRN for shortness of breath and/or wheezing      Vital Signs Last 24 Hrs  T(C): 36.4 (30 Mar 2025 05:10), Max: 36.9 (29 Mar 2025 23:35)  T(F): 97.5 (30 Mar 2025 05:10), Max: 98.4 (29 Mar 2025 23:35)  HR: 71 (30 Mar 2025 05:10) (68 - 80)  BP: 134/64 (30 Mar 2025 05:10) (117/59 - 143/78)  BP(mean): --  RR: 18 (30 Mar 2025 05:10) (18 - 19)  SpO2: 94% (30 Mar 2025 05:10) (93% - 94%)    Parameters below as of 30 Mar 2025 05:10  Patient On (Oxygen Delivery Method): room air      PHYSICAL EXAMINATION:  GENERAL: NAD, sitting in chair   HEAD:  Atraumatic, Normocephalic  EYES:  conjunctiva and sclera clear  NECK: Supple, No JVD  CHEST/LUNG: Intermittent cough. Clear to auscultation bilaterally; No rales, rhonchi, or rubs  HEART: Regular rate and rhythm; No murmurs, rubs, or gallops  ABDOMEN: Soft, Nontender, Nondistended; Bowel sounds present, no guarding  NERVOUS SYSTEM:  Alert & Oriented X3, speech intact  : voiding well  EXTREMITIES: 2+ Peripheral Pulses, B/L edema. No clubbing, cyanosis  SKIN: warm dry                          10.8   7.70  )-----------( 222      ( 30 Mar 2025 06:00 )             33.4     03-30    141  |  109[H]  |  19[H]  ----------------------------<  111[H]  3.8   |  27  |  1.07    Ca    8.9      30 Mar 2025 06:00  Phos  3.9     03-30  Mg     1.5     03-30

## 2025-03-30 NOTE — PROGRESS NOTE ADULT - ATTENDING COMMENTS
CC: L knee pain  S:  up in chair, sleeping  O:  Vital Signs Last 24 Hrs  T(C): 36.6 (03-30-25 @ 07:55), Max: 36.9 (03-29-25 @ 23:35)  T(F): 97.9 (03-30-25 @ 07:55), Max: 98.4 (03-29-25 @ 23:35)  HR: 65 (03-30-25 @ 07:55) (65 - 80)  BP: 152/64 (03-30-25 @ 07:55) (117/59 - 152/64)  RR: 18 (03-30-25 @ 07:55) (18 - 19)  SpO2: 95% (03-30-25 @ 07:55) (93% - 95%)    PE:  Gen: Oriented, alert, No acute distress Eyes: conjunctivae and lid normal, sclera clear with no icterus ENT: nose and throat exam normal CVS: S1, S2, RRR; no murmur, no rubs, no gallops Pulm: Good air exchange, Breath sounds equal bilaterally, no rales rhonchi,  no wheezes Chest: nontender, no chest deformity, chest movement symmetrical Gl: abdomen soft, nontender, nondistended, bowel sounds normoactive, no masses palpated Musk: no msk pain, BLE edema Skin: no skin lesions, skin turgor normal, warm and well perfused Neuro: Awake, alert,Psych: normal affect, insight      Assessment and Plan:   · Assessment	  A/P:  Traci Castillo is a  84yo woman with PMH of COPD, HTN, nephroureteral lithiasis, gout, DVT on eliquis, osteoporosis, prior breast CA s/p lumpectomy, chronic b/l LE edema, OA s/p right TKR, p/w acute L knee pain. Found to have COVID-19. ED course c/b SVT s/p adenosine. Admitted to telemetry given SVT with sepsis 2/2 UTI.  #SVT - has now broken. Monitoring on tele. Monitoring  BMP/Mg  #Sepsis secondary to UTI - Blood and Urine cx ngtd. c/w IV ceftriaxone for now. Patient reports being prescribed an Abx outpatient and then having it switched but not finishing. She believes she took it for 1 day but is not certain. Team has called PMD for Cx results but has not heard back yet.   #COVID-19 - CXR without infiltrates - supportive measures. Isolation precautions as ordered.  #Knee pain secondary to OA - conservative measures for now. Outpatient f/u with ortho (already has an orthopedic surgeon). d/w patient.  #Cholelithiasis - asymptomatic. No need for further w/u. Normal LFTs  PT recs LEXI. Patient agreeable.  d/c planning        Labs reviewed:                         10.7   7.36  )-----------( 203      ( 29 Mar 2025 06:40 )             33.0   03-29    141  |  110[H]  |  18  ----------------------------<  107[H]  3.6   |  27  |  1.02    Ca    8.5      29 Mar 2025 06:40  Phos  3.0     03-29  Mg     1.7     03-29      Imaging reviewed:   US Doppler bilat:  No evidence of deep venous thrombosis in either lower extremity.    US Abd:  IMPRESSION:  Cholelithiasis with borderline to mild gallbladder wall thickening.   Correlate with symptomatology. If there is a persistent concern for   cholecystitis. HIDA scan can be obtained.  No biliary dilatation.    CXR:  IMPRESSION:    Severe OA in the lateral compartment with a small suprapatellar effusion.    Mild cardiomegaly and mild pulmonary venous congestion.    L knee Xray:  IMPRESSION:    Severe OA in the lateral compartment with a small suprapatellar effusion.    Mild cardiomegaly and mild pulmonary venous congestion. CC: L knee pain  S:  up in chair, sleeping  O:  Vital Signs Last 24 Hrs  T(C): 36.6 (03-30-25 @ 07:55), Max: 36.9 (03-29-25 @ 23:35)  T(F): 97.9 (03-30-25 @ 07:55), Max: 98.4 (03-29-25 @ 23:35)  HR: 65 (03-30-25 @ 07:55) (65 - 80)  BP: 152/64 (03-30-25 @ 07:55) (117/59 - 152/64)  RR: 18 (03-30-25 @ 07:55) (18 - 19)  SpO2: 95% (03-30-25 @ 07:55) (93% - 95%)    PE:  Gen: No acute distress Eyes: conjunctivae and lid normal, sclera clear with no icterus ENT: nose and throat exam normal CVS: S1, S2, RRR; no murmur, no rubs, no gallops Pulm: Good air exchange, Breath sounds equal bilaterally, no rales rhonchi,  no wheezes Chest: nontender, no chest deformity, chest movement symmetrical Gl: abdomen soft, nontender, nondistended, bowel sounds normoactive, no masses palpated Musk: no msk pain, BLE edema Skin: no skin lesions, skin turgor normal, warm and well perfused Neuro: sleeping, easily arousable        A/P:  Traci Castillo is a  84yo woman with PMH of COPD, HTN, nephroureteral lithiasis, gout, DVT on eliquis, osteoporosis, prior breast CA s/p lumpectomy, chronic b/l LE edema, OA s/p right TKR, p/w acute L knee pain. Found to have COVID-19. ED course c/b SVT s/p adenosine. Admitted to telemetry given SVT with sepsis 2/2 UTI.  #SVT - has now broken. Monitoring on tele. Monitoring  BMP/Mg  #Sepsis secondary to UTI - Blood and Urine cx ngtd. c/w IV ceftriaxone for now. Patient reports being prescribed an Abx outpatient and then having it switched but not finishing. She believes she took it for 1 day but is not certain. Team has called PMD for Cx results but has not heard back yet.   #COVID-19 - CXR without infiltrates - supportive measures. Isolation precautions as ordered.  #Knee pain secondary to OA - conservative measures for now. Outpatient f/u with ortho (already has an orthopedic surgeon). d/w patient.  #Cholelithiasis - asymptomatic. No need for further w/u. Normal LFTs  PT recs LEXI. Patient agreeable.  d/c planning      Labs reviewed:                                  10.8   7.70  )-----------( 222      ( 30 Mar 2025 06:00 )             33.4   03-30    141  |  109[H]  |  19[H]  ----------------------------<  111[H]  3.8   |  27  |  1.07    Ca    8.9      30 Mar 2025 06:00  Phos  3.9     03-30  Mg     1.5     03-30      Imaging reviewed:   US Doppler bilat:  No evidence of deep venous thrombosis in either lower extremity.    US Abd:  IMPRESSION:  Cholelithiasis with borderline to mild gallbladder wall thickening.   Correlate with symptomatology. If there is a persistent concern for   cholecystitis. HIDA scan can be obtained.  No biliary dilatation.    CXR:  IMPRESSION:    Severe OA in the lateral compartment with a small suprapatellar effusion.    Mild cardiomegaly and mild pulmonary venous congestion.    L knee Xray:  IMPRESSION:    Severe OA in the lateral compartment with a small suprapatellar effusion.    Mild cardiomegaly and mild pulmonary venous congestion.

## 2025-03-30 NOTE — DISCHARGE NOTE PROVIDER - HOSPITAL COURSE
85 F, PMHx COPD, HTN, nephroureteral lithiasis, gout, DVT on eliquis, osteoporosis, breast CA s/p lumpectomy, chronic b/l LE edema, s/p right TKR, p/w acute L knee pain and inability to bear weight x 4 days. Met SIRS criteria with 101.2 F, HR >90, RR>20. Found to have COVID with multiple electrolyte disturbances. K 2.8, Mg 1.3, phos 1.5. WBC and lactate wnl. UA: WBC 0, positive nitrite, large leuk esterase (recently on nitrofurantoin). CXR mild pulmonary venous congestion. Xray shows narrowing of joint space and severe OA in the lateral compartment with a small suprapatellar effusion. ED course c/b SVT refractory to vagal maneuvers. s/p adenosine. Patient was admitted to telemetry SVT ISO sepsis and undifferentiated sepsis possibly 2/2 UTI vs. PNA. BCx neg NGTD, UCx showing normal naldo. Patient was treated with IV ceftriaxone x5 days for UTI coverage. No events were noted on tele. Knee pain was managed conservatively with pain meds. PT recommended LEXI.     Given patient's improved clinical status and current hemodynamic stability, decision was made to discharge the patient. Patient is stable for discharge per attending and is advised to follow up with PCP as outpatient.

## 2025-03-30 NOTE — PROGRESS NOTE ADULT - PROBLEM SELECTOR PLAN 4
episode of SVT in ED refractory to vagal maneuvers   likely ISO electrolyte derangements  s/p adenosine 6mg IV push 3/27  no events noted on tele   - c/w home metoprolol   - monitor electrolytes

## 2025-03-30 NOTE — DISCHARGE NOTE PROVIDER - NSDCFUSCHEDAPPT_GEN_ALL_CORE_FT
Gary Ann  [facility]  [phone]  Scheduled Appointment: 04/09/2025

## 2025-03-30 NOTE — DISCHARGE NOTE PROVIDER - NSDCCPCAREPLAN_GEN_ALL_CORE_FT
PRINCIPAL DISCHARGE DIAGNOSIS  Diagnosis: Sepsis  Assessment and Plan of Treatment: You were diagnosed with Sepsis which is a very serious condition resulting from the presence of harmful micro-organisms in the blood or other tissues and the body's response to their presence, potentially leading to the malfunctioning of various organs, shock and death. You had Sepsis secondary to COVID-19 and a urinary tract infection. Blood cultures did not grown any microorganisms. You were treated with IV fluids and IV antibiotics with ceftriaxone for 5 days. Please follow up with your PCP in 1 week from discharge.      SECONDARY DISCHARGE DIAGNOSES  Diagnosis: UTI (urinary tract infection)  Assessment and Plan of Treatment: A urinary tract infection, or UTI, is a general term for an infection anywhere between the kidneys and the urethra (where urine comes out). Most UTIs are bladder infections. They often cause pain or burning when you urinate. Urinalysis was suggestive of urinary tract infection. You were given IV antibiotic ceftriaxone. Urine culture was done and showed normal naldo, however, you were recently on antibiotics before presenting to the hospital. Urinary tract infections can cause weakness, confusion, or spread to other organ systems in the body. Avoid drinks that are carbonated or have caffeine. They can irritate the bladder. Urinate often. Try to empty your bladder each time. Avoid douches, bubble baths, feminine hygiene sprays, and other feminine hygiene products that have deodorants. After you use the toilet, wipe from front to back. Please follow up with your PCP.    Diagnosis: COVID-19  Assessment and Plan of Treatment: When you presented to the hospital you tested positive for covid. You had no symptoms so you just had supportive treatment. Please follow latest CDC guidlines. If you test positive for COVID-19 on 3/26/25, stay home for at least 5 days and isolate from others in your home. Wear a high-quality mask if you must be around others at home and in public. Do not go places where you are unable to wear a mask. For travel guidance, see CDC’s Travel webpage. Do not travel. Stay home and separate from others as much as possible. Use a separate bathroom, if possible. Don’t share personal household items, like cups, towels, and utensils. Monitor your symptoms. If you have an emergency warning sign (like trouble breathing), seek emergency medical care immediately.    Diagnosis: SVT (supraventricular tachycardia)  Assessment and Plan of Treatment: During your hospitalization, you experienced a rapid heartbeat, known as supraventricular tachycardia (SVT). Your fast heartbeat did not slow down with simple physical maneuvers, which are sometimes recommended to help with SVT. We believe this episode was likely due to an imbalance in your body’s electrolytes, which are minerals important for heart function. You received a medication called adenosine, which helped to correct your heartbeat. While you were in the hospital, we monitored your heart and did not notice any further episodes of SVT on the heart monitor. Continue taking your prescribed medication, metoprolol, as directed. This medication helps to keep your heart rate stable. Make sure to follow up with your doctor to have your electrolyte levels checked, as keeping them balanced is important for preventing future episodes.    Diagnosis: Hypokalemia  Assessment and Plan of Treatment: Your were diagnosed with hypokalemia which means low potassium in the blood. It may be secondary to gastrointestinal losses from diarrhea or vomiting, or medication side effect or poor nutrition. Your potassium was repleted with potassium supplements while you were hospitalized. Upon discharge, your potassium is back to normal. It recommended that you increase your intake of potassium rich foods. These include fresh fruits, juices, and vegetables. They also include nuts, beans, and milk. Please follow up with your PCP in a week from discharge with repeat potassium levels and discuss further management.    Diagnosis: Hypophosphatemia  Assessment and Plan of Treatment: You were diagnosed with hypophosphatemia which means low phosphate levels in the blood. You received phosphate supplements till it normalized. Please follow up with your PCP in a week from discharge with repeat Electrolytes.    Diagnosis: Hypomagnesemia  Assessment and Plan of Treatment: You were diagnosed with hypomagnesemia which means low magnesium levels in the blood. You received Magnesium IV till it normalized. Please follow up with your PCP in a week from discharge with repeat Electrolytes.    Diagnosis: COPD (chronic obstructive pulmonary disease)  Assessment and Plan of Treatment: You have history of COPD. You were continued on your home medications. Please continue to take the medications as prescribed by your doctor and follow up with your PCP and Pulmonologist to adjust medications as needed.    Diagnosis: Cholelithiasis  Assessment and Plan of Treatment: You were found to have  gallstones, a condition known as cholelithiasis. An ultrasound of your abdomen showed gallstones and a slightly thickened gallbladder wall, however, you did not have significant inflammation at this time. Follow up with your doctor outside of the hospital to discuss your condition and any necessary treatment. Pay attention to any new symptoms such as severe abdominal pain, fever, or nausea, and seek medical care if these occur.    Diagnosis: Acute pain of left knee  Assessment and Plan of Treatment: You came to the hospital because of severe pain in your left knee and difficulty putting weight on it. An X-ray of your knee showed that you have severe osteoarthritis, which is a condition where the protective cartilage in your knee wears down. This was evident by the narrowing of the joint space and changes in the lateral compartment of your knee. There is also a small amount of fluid above your knee joint, known as a suprapatellar effusion. You have a history of having your right knee replaced in the past. Physical therapy recommended that you participate in a short-term rehabilitation program (Subacute Rehabilitation or LEXI) to help manage your pain and improve your ability to move your knee. Please follow up with your orthopedic specialist to discuss further treatment options, which may include continued physical therapy, medications, or possible surgical interventions.

## 2025-03-30 NOTE — PROGRESS NOTE ADULT - PROBLEM SELECTOR PLAN 11
DVT ppx: Eliquis (prior hx of DVT)

## 2025-03-31 ENCOUNTER — TRANSCRIPTION ENCOUNTER (OUTPATIENT)
Age: 85
End: 2025-03-31

## 2025-03-31 VITALS
DIASTOLIC BLOOD PRESSURE: 65 MMHG | TEMPERATURE: 98 F | OXYGEN SATURATION: 94 % | SYSTOLIC BLOOD PRESSURE: 152 MMHG | HEART RATE: 79 BPM | RESPIRATION RATE: 18 BRPM

## 2025-03-31 LAB
ANION GAP SERPL CALC-SCNC: 5 MMOL/L — SIGNIFICANT CHANGE UP (ref 5–17)
BUN SERPL-MCNC: 18 MG/DL — SIGNIFICANT CHANGE UP (ref 7–18)
CALCIUM SERPL-MCNC: 8.8 MG/DL — SIGNIFICANT CHANGE UP (ref 8.4–10.5)
CHLORIDE SERPL-SCNC: 107 MMOL/L — SIGNIFICANT CHANGE UP (ref 96–108)
CO2 SERPL-SCNC: 29 MMOL/L — SIGNIFICANT CHANGE UP (ref 22–31)
CREAT SERPL-MCNC: 0.98 MG/DL — SIGNIFICANT CHANGE UP (ref 0.5–1.3)
CULTURE RESULTS: SIGNIFICANT CHANGE UP
CULTURE RESULTS: SIGNIFICANT CHANGE UP
EGFR: 56 ML/MIN/1.73M2 — LOW
EGFR: 56 ML/MIN/1.73M2 — LOW
GLUCOSE SERPL-MCNC: 125 MG/DL — HIGH (ref 70–99)
HCT VFR BLD CALC: 33.6 % — LOW (ref 34.5–45)
HGB BLD-MCNC: 11.1 G/DL — LOW (ref 11.5–15.5)
MAGNESIUM SERPL-MCNC: 1.9 MG/DL — SIGNIFICANT CHANGE UP (ref 1.6–2.6)
MCHC RBC-ENTMCNC: 28.3 PG — SIGNIFICANT CHANGE UP (ref 27–34)
MCHC RBC-ENTMCNC: 33 G/DL — SIGNIFICANT CHANGE UP (ref 32–36)
MCV RBC AUTO: 85.7 FL — SIGNIFICANT CHANGE UP (ref 80–100)
NRBC BLD AUTO-RTO: 0 /100 WBCS — SIGNIFICANT CHANGE UP (ref 0–0)
PHOSPHATE SERPL-MCNC: 3.8 MG/DL — SIGNIFICANT CHANGE UP (ref 2.5–4.5)
PLATELET # BLD AUTO: 230 K/UL — SIGNIFICANT CHANGE UP (ref 150–400)
POTASSIUM SERPL-MCNC: 3.7 MMOL/L — SIGNIFICANT CHANGE UP (ref 3.5–5.3)
POTASSIUM SERPL-SCNC: 3.7 MMOL/L — SIGNIFICANT CHANGE UP (ref 3.5–5.3)
RBC # BLD: 3.92 M/UL — SIGNIFICANT CHANGE UP (ref 3.8–5.2)
RBC # FLD: 14.5 % — SIGNIFICANT CHANGE UP (ref 10.3–14.5)
SODIUM SERPL-SCNC: 141 MMOL/L — SIGNIFICANT CHANGE UP (ref 135–145)
SPECIMEN SOURCE: SIGNIFICANT CHANGE UP
SPECIMEN SOURCE: SIGNIFICANT CHANGE UP
WBC # BLD: 8.61 K/UL — SIGNIFICANT CHANGE UP (ref 3.8–10.5)
WBC # FLD AUTO: 8.61 K/UL — SIGNIFICANT CHANGE UP (ref 3.8–10.5)

## 2025-03-31 PROCEDURE — 86703 HIV-1/HIV-2 1 RESULT ANTBDY: CPT

## 2025-03-31 PROCEDURE — 80048 BASIC METABOLIC PNL TOTAL CA: CPT

## 2025-03-31 PROCEDURE — 76705 ECHO EXAM OF ABDOMEN: CPT

## 2025-03-31 PROCEDURE — 83036 HEMOGLOBIN GLYCOSYLATED A1C: CPT

## 2025-03-31 PROCEDURE — 85025 COMPLETE CBC W/AUTO DIFF WBC: CPT

## 2025-03-31 PROCEDURE — 99239 HOSP IP/OBS DSCHRG MGMT >30: CPT

## 2025-03-31 PROCEDURE — 36415 COLL VENOUS BLD VENIPUNCTURE: CPT

## 2025-03-31 PROCEDURE — 82962 GLUCOSE BLOOD TEST: CPT

## 2025-03-31 PROCEDURE — 96375 TX/PRO/DX INJ NEW DRUG ADDON: CPT

## 2025-03-31 PROCEDURE — 87637 SARSCOV2&INF A&B&RSV AMP PRB: CPT

## 2025-03-31 PROCEDURE — 93970 EXTREMITY STUDY: CPT

## 2025-03-31 PROCEDURE — 71045 X-RAY EXAM CHEST 1 VIEW: CPT

## 2025-03-31 PROCEDURE — 85610 PROTHROMBIN TIME: CPT

## 2025-03-31 PROCEDURE — 84100 ASSAY OF PHOSPHORUS: CPT

## 2025-03-31 PROCEDURE — 83735 ASSAY OF MAGNESIUM: CPT

## 2025-03-31 PROCEDURE — 83605 ASSAY OF LACTIC ACID: CPT

## 2025-03-31 PROCEDURE — 85652 RBC SED RATE AUTOMATED: CPT

## 2025-03-31 PROCEDURE — 86140 C-REACTIVE PROTEIN: CPT

## 2025-03-31 PROCEDURE — 94640 AIRWAY INHALATION TREATMENT: CPT

## 2025-03-31 PROCEDURE — 80053 COMPREHEN METABOLIC PANEL: CPT

## 2025-03-31 PROCEDURE — 96374 THER/PROPH/DIAG INJ IV PUSH: CPT

## 2025-03-31 PROCEDURE — 96376 TX/PRO/DX INJ SAME DRUG ADON: CPT

## 2025-03-31 PROCEDURE — 99285 EMERGENCY DEPT VISIT HI MDM: CPT

## 2025-03-31 PROCEDURE — 84484 ASSAY OF TROPONIN QUANT: CPT

## 2025-03-31 PROCEDURE — 85027 COMPLETE CBC AUTOMATED: CPT

## 2025-03-31 PROCEDURE — 73562 X-RAY EXAM OF KNEE 3: CPT

## 2025-03-31 PROCEDURE — 97162 PT EVAL MOD COMPLEX 30 MIN: CPT

## 2025-03-31 PROCEDURE — 85730 THROMBOPLASTIN TIME PARTIAL: CPT

## 2025-03-31 PROCEDURE — 80061 LIPID PANEL: CPT

## 2025-03-31 PROCEDURE — 81001 URINALYSIS AUTO W/SCOPE: CPT

## 2025-03-31 PROCEDURE — 87086 URINE CULTURE/COLONY COUNT: CPT

## 2025-03-31 PROCEDURE — 93005 ELECTROCARDIOGRAM TRACING: CPT

## 2025-03-31 PROCEDURE — 87040 BLOOD CULTURE FOR BACTERIA: CPT

## 2025-03-31 RX ADMIN — METOPROLOL SUCCINATE 25 MILLIGRAM(S): 50 TABLET, EXTENDED RELEASE ORAL at 06:04

## 2025-03-31 RX ADMIN — Medication 1000 MILLIGRAM(S): at 14:08

## 2025-03-31 RX ADMIN — APIXABAN 5 MILLIGRAM(S): 2.5 TABLET, FILM COATED ORAL at 06:05

## 2025-03-31 RX ADMIN — Medication 1000 MILLIGRAM(S): at 15:08

## 2025-03-31 RX ADMIN — Medication 100 MILLIGRAM(S): at 12:17

## 2025-03-31 NOTE — DISCHARGE NOTE NURSING/CASE MANAGEMENT/SOCIAL WORK - NSDCPNPNATDISSUGG_GEN_ALL_CORE
No
FAMILY HISTORY:  Father  Still living? No  Family history of lymphoma, Age at diagnosis: Age Unknown    Mother  Still living? No  Family history of oral cancer, Age at diagnosis: Age Unknown  FH: hypertension, Age at diagnosis: Age Unknown

## 2025-03-31 NOTE — DISCHARGE NOTE NURSING/CASE MANAGEMENT/SOCIAL WORK - NSDCPEFALRISK_GEN_ALL_CORE
For information on Fall & Injury Prevention, visit: [email or web address] OR  [email or web address] OR  https://www.cdc.gov/steadi/patient.html

## 2025-03-31 NOTE — PROGRESS NOTE ADULT - SUBJECTIVE AND OBJECTIVE BOX
CC: L knee pain  S:  she feels well, she is agreeable on LEXI  O:  Vital Signs Last 24 Hrs  T(C): 36.9 (03-31-25 @ 08:14), Max: 36.9 (03-31-25 @ 08:14)  T(F): 98.5 (03-31-25 @ 08:14), Max: 98.5 (03-31-25 @ 08:14)  HR: 72 (03-31-25 @ 08:14) (72 - 79)  BP: 135/67 (03-31-25 @ 08:14) (134/76 - 149/68)  RR: 19 (03-31-25 @ 08:14) (18 - 19)  SpO2: 94% (03-31-25 @ 08:14) (93% - 97%)    PE:  Gen: Oriented, alert, No acute distress Eyes: conjunctivae and lid normal, sclera clear with no icterus ENT: nose and throat exam normal CVS: S1, S2, RRR; no murmur, no rubs, no gallops Pulm: Good air exchange, Breath sounds equal bilaterally, no rales rhonchi,  no wheezes Chest: nontender, no chest deformity, chest movement symmetrical Gl: abdomen soft, nontender, nondistended, bowel sounds normoactive, no masses palpated Musk: no msk pain, BLE edema Skin: no skin lesions, skin turgor normal, warm and well perfused Neuro: Awake, alert,Psych: normal affect, insight

## 2025-03-31 NOTE — PROGRESS NOTE ADULT - REASON FOR ADMISSION
sepsis, SVT, multiple electrolyte abnormalities

## 2025-03-31 NOTE — DISCHARGE NOTE NURSING/CASE MANAGEMENT/SOCIAL WORK - FINANCIAL ASSISTANCE
[facility] provides services at a reduced cost to those who are determined to be eligible through [facility]’s financial assistance program. Information regarding [facility]’s financial assistance program can be found by going to [email or web address] or by calling [phone].

## 2025-03-31 NOTE — PROGRESS NOTE ADULT - ASSESSMENT
A/P:  Traci Castillo is a  86yo woman with PMH of COPD, HTN, nephroureteral lithiasis, gout, DVT on eliquis, osteoporosis, prior breast CA s/p lumpectomy, chronic b/l LE edema, OA s/p right TKR, p/w acute L knee pain. Found to have COVID-19. ED course c/b SVT s/p adenosine. Admitted to telemetry given SVT with sepsis 2/2 UTI.  #SVT - has now broken. Monitoring  BMP/Mg  #Sepsis secondary to UTI - Blood and Urine cx ngtd. s/p IV ceftriaxone  #COVID-19 - CXR without infiltrates - supportive measures. Isolation precautions as ordered.  #Knee pain secondary to OA - conservative measures for now. Outpatient f/u with ortho (already has an orthopedic surgeon). d/w patient.  #Cholelithiasis - asymptomatic. No need for further w/u. Normal LFTs  PT recs LEXI. Patient agreeable. Pending choices.  d/c planning        Labs reviewed:                                              11.1   8.61  )-----------( 230      ( 31 Mar 2025 07:20 )             33.6   03-31    141  |  107  |  18  ----------------------------<  125[H]  3.7   |  29  |  0.98    Ca    8.8      31 Mar 2025 07:20  Phos  3.8     03-31  Mg     1.9     03-31      Imaging reviewed:   US Doppler bilat:  No evidence of deep venous thrombosis in either lower extremity.    US Abd:  IMPRESSION:  Cholelithiasis with borderline to mild gallbladder wall thickening.   Correlate with symptomatology. If there is a persistent concern for   cholecystitis. HIDA scan can be obtained.  No biliary dilatation.    CXR:  IMPRESSION:    Severe OA in the lateral compartment with a small suprapatellar effusion.    Mild cardiomegaly and mild pulmonary venous congestion.    L knee Xray:  IMPRESSION:    Severe OA in the lateral compartment with a small suprapatellar effusion.    Mild cardiomegaly and mild pulmonary venous congestion.

## 2025-03-31 NOTE — DISCHARGE NOTE NURSING/CASE MANAGEMENT/SOCIAL WORK - NSDCVIVACCINE_GEN_ALL_CORE_FT
Tdap; 24-Jun-2021 23:06; Chantel Moody (RN); [employer]; [id number] (Exp. Date: 25-Nov-2022); IntraMuscular; Deltoid Left.; 0.5 milliLiter(s); VIS (VIS Published: 09-May-2013, VIS Presented: 24-Jun-2021);

## 2025-03-31 NOTE — DISCHARGE NOTE NURSING/CASE MANAGEMENT/SOCIAL WORK - PATIENT PORTAL LINK FT
You can access the FollowMyHealth Patient Portal offered by [facility] by registering at the following website: [email or web address]. By joining [email or web address]’s FollowMyHealth portal, you will also be able to view your health information using other applications (apps) compatible with our system.

## 2025-04-09 ENCOUNTER — APPOINTMENT (OUTPATIENT)
Dept: PULMONOLOGY | Facility: CLINIC | Age: 85
End: 2025-04-09

## 2025-05-21 NOTE — PROGRESS NOTE ADULT - PROBLEM SELECTOR PLAN 2
[facility] [employer] needs to go to be supplied by a DME company. IE [facility], [employer]. Not sure who [facility] Medicare is with.  
continue to monitor creatinine, as has been improving after stent placement  continue to monitor phos, continue sevelamer at home dose
.start Eliquis bid  Stop heparin at noon
continue to monitor creatinine, as has been improving after stent placement  continue to monitor phos, continue sevelamer at home dose

## 2025-06-25 ENCOUNTER — APPOINTMENT (OUTPATIENT)
Dept: PULMONOLOGY | Facility: CLINIC | Age: 85
End: 2025-06-25

## 2025-06-25 VITALS
TEMPERATURE: 98.7 F | SYSTOLIC BLOOD PRESSURE: 129 MMHG | OXYGEN SATURATION: 92 % | DIASTOLIC BLOOD PRESSURE: 78 MMHG | HEIGHT: 62 IN | HEART RATE: 65 BPM

## 2025-06-25 VITALS — WEIGHT: 214 LBS | BODY MASS INDEX: 39.14 KG/M2

## 2025-06-25 PROCEDURE — 94060 EVALUATION OF WHEEZING: CPT

## 2025-06-25 PROCEDURE — 99214 OFFICE O/P EST MOD 30 MIN: CPT | Mod: 25

## 2025-09-11 ENCOUNTER — TRANSCRIPTION ENCOUNTER (OUTPATIENT)
Age: 85
End: 2025-09-11